# Patient Record
Sex: MALE | Race: WHITE | NOT HISPANIC OR LATINO | Employment: OTHER | URBAN - METROPOLITAN AREA
[De-identification: names, ages, dates, MRNs, and addresses within clinical notes are randomized per-mention and may not be internally consistent; named-entity substitution may affect disease eponyms.]

---

## 2017-01-24 ENCOUNTER — LAB VISIT (OUTPATIENT)
Dept: LAB | Facility: HOSPITAL | Age: 67
End: 2017-01-24
Attending: FAMILY MEDICINE
Payer: MEDICARE

## 2017-01-24 ENCOUNTER — OFFICE VISIT (OUTPATIENT)
Dept: INTERNAL MEDICINE | Facility: CLINIC | Age: 67
End: 2017-01-24
Payer: MEDICARE

## 2017-01-24 VITALS
WEIGHT: 301.38 LBS | HEIGHT: 71 IN | DIASTOLIC BLOOD PRESSURE: 76 MMHG | TEMPERATURE: 97 F | SYSTOLIC BLOOD PRESSURE: 128 MMHG | HEART RATE: 69 BPM | BODY MASS INDEX: 42.19 KG/M2

## 2017-01-24 DIAGNOSIS — I10 ESSENTIAL HYPERTENSION: Chronic | ICD-10-CM

## 2017-01-24 DIAGNOSIS — E78.00 ELEVATED CHOLESTEROL: Chronic | ICD-10-CM

## 2017-01-24 DIAGNOSIS — Z12.5 SCREENING FOR PROSTATE CANCER: ICD-10-CM

## 2017-01-24 DIAGNOSIS — Z00.00 ROUTINE HEALTH MAINTENANCE: Primary | ICD-10-CM

## 2017-01-24 DIAGNOSIS — K21.9 GASTROESOPHAGEAL REFLUX DISEASE, ESOPHAGITIS PRESENCE NOT SPECIFIED: ICD-10-CM

## 2017-01-24 DIAGNOSIS — E66.01 MORBID OBESITY, UNSPECIFIED OBESITY TYPE: ICD-10-CM

## 2017-01-24 DIAGNOSIS — R53.83 FATIGUE, UNSPECIFIED TYPE: ICD-10-CM

## 2017-01-24 LAB
BASOPHILS # BLD AUTO: 0.03 K/UL
BASOPHILS NFR BLD: 0.5 %
DIFFERENTIAL METHOD: ABNORMAL
EOSINOPHIL # BLD AUTO: 0.1 K/UL
EOSINOPHIL NFR BLD: 1.7 %
ERYTHROCYTE [DISTWIDTH] IN BLOOD BY AUTOMATED COUNT: 13.5 %
HCT VFR BLD AUTO: 44.6 %
HGB BLD-MCNC: 15 G/DL
LYMPHOCYTES # BLD AUTO: 1.8 K/UL
LYMPHOCYTES NFR BLD: 27.2 %
MCH RBC QN AUTO: 31.1 PG
MCHC RBC AUTO-ENTMCNC: 33.6 %
MCV RBC AUTO: 93 FL
MONOCYTES # BLD AUTO: 0.8 K/UL
MONOCYTES NFR BLD: 11.9 %
NEUTROPHILS # BLD AUTO: 3.9 K/UL
NEUTROPHILS NFR BLD: 58.5 %
PLATELET # BLD AUTO: 237 K/UL
PMV BLD AUTO: 11.2 FL
RBC # BLD AUTO: 4.82 M/UL
TESTOST SERPL-MCNC: 301 NG/DL
TSH SERPL DL<=0.005 MIU/L-ACNC: 2.55 UIU/ML
WBC # BLD AUTO: 6.57 K/UL

## 2017-01-24 PROCEDURE — 99397 PER PM REEVAL EST PAT 65+ YR: CPT | Mod: S$GLB,,, | Performed by: FAMILY MEDICINE

## 2017-01-24 PROCEDURE — 3078F DIAST BP <80 MM HG: CPT | Mod: S$GLB,,, | Performed by: FAMILY MEDICINE

## 2017-01-24 PROCEDURE — 3074F SYST BP LT 130 MM HG: CPT | Mod: S$GLB,,, | Performed by: FAMILY MEDICINE

## 2017-01-24 PROCEDURE — 84403 ASSAY OF TOTAL TESTOSTERONE: CPT

## 2017-01-24 PROCEDURE — 84443 ASSAY THYROID STIM HORMONE: CPT

## 2017-01-24 PROCEDURE — 85025 COMPLETE CBC W/AUTO DIFF WBC: CPT

## 2017-01-24 PROCEDURE — 36415 COLL VENOUS BLD VENIPUNCTURE: CPT | Mod: PO

## 2017-01-24 PROCEDURE — 99999 PR PBB SHADOW E&M-EST. PATIENT-LVL II: CPT | Mod: PBBFAC,,, | Performed by: FAMILY MEDICINE

## 2017-01-24 RX ORDER — ESOMEPRAZOLE MAGNESIUM 40 MG/1
40 CAPSULE, DELAYED RELEASE ORAL DAILY
Qty: 90 CAPSULE | Refills: 3 | Status: SHIPPED | OUTPATIENT
Start: 2017-01-24 | End: 2018-01-23 | Stop reason: SDUPTHER

## 2017-01-24 RX ORDER — ESOMEPRAZOLE MAGNESIUM 40 MG/1
CAPSULE, DELAYED RELEASE ORAL
COMMUNITY
Start: 2016-11-05 | End: 2017-01-24 | Stop reason: SDUPTHER

## 2017-01-24 NOTE — MR AVS SNAPSHOT
[facility] Internal Medicine  [address] [address] [address]  Phone: [phone]                  Juan Shepherd   2017 8:00 AM   Office Visit    Description:  Male : 1950   Provider:  Luis Farnsworth MD   Department:  [facility] Internal Medicine           Diagnoses this Visit        Comments    Routine health maintenance    -  Primary     Essential hypertension         Morbid obesity, unspecified obesity type         Elevated cholesterol         Gastroesophageal reflux disease, esophagitis presence not specified         Fatigue, unspecified type         Screening for prostate cancer                To Do List           Future Appointments        Provider Department Dept Phone    2017 9:10 AM [facility] LAB[facility]ATORY [facility] [phone]    2017 8:30 AM THOMAS Carr MD [facility] Ophthalmology [phone]    2018 8:00 AM [facility] LABORATORY [facility] [phone]    2018 8:20 AM Lusi Farnsworth MD [facility] Internal Medicine [phone]      Goals (5 Years of Data)     None      Follow-Up and Disposition     Return in about 1 year (around 2018).    Follow-up and Disposition History       These Medications        Disp Refills Start End    esomeprazole (NEXIUM) 40 MG capsule 90 capsule 3 2017     Take 1 capsule (40 mg total) by mouth once daily. - Oral    Pharmacy: [facility] - [place] [id number] [facility] #: [phone]         [facility]     [facility] -  Assistance  Registered nurses in the [facility] provide clinical advisement, health education, appointment booking, and other advisory services.  Call for this free service at [phone].             Medications           Message regarding Medications     Verify the changes and/or additions to your medication regime listed below are the same as discussed with your clinician today.  If any of  "these changes or additions are incorrect, please notify your healthcare provider.        START taking these NEW medications        Refills    esomeprazole (NEXIUM) 40 MG capsule 3    Sig: Take 1 capsule (40 mg total) by mouth once daily.    Class: Normal    Route: Oral      STOP taking these medications     simvastatin (ZOCOR) 40 MG tablet TAKE 1 TABLET EVERY EVENING           Verify that the below list of medications is an accurate representation of the medications you are currently taking.  If none reported, the list may be blank. If incorrect, please contact your healthcare provider. Carry this list with you in case of emergency.           Current Medications     aspirin (ECOTRIN) 81 MG EC tablet Take 81 mg by mouth once daily.    esomeprazole (NEXIUM) 40 MG capsule Take 1 capsule (40 mg total) by mouth once daily.    lisinopril-hydrochlorothiazide (PRINZIDE,ZESTORETIC) 20-12.5 mg per tablet TAKE 1 TABLET ONE TIME DAILY           Clinical Reference Information           Vital Signs - Last Recorded  Most recent update: 1/24/2017  8:07 AM by Enedina Avila LPN    BP Pulse Temp    128/76 (BP Location: Left arm, Patient Position: Sitting, BP Method: Manual) 69 96.8 °F (36 °C) (Tympanic)    Ht Wt BMI    5' 11" (1.803 m) (!) 136.7 kg (301 lb 5.9 oz) 42.03 kg/m2      Blood Pressure          Most Recent Value    BP  128/76      Allergies as of 1/24/2017     Bactrim [Sulfamethoxazole-trimethoprim]    Simvastatin      Immunizations Administered on Date of Encounter - 1/24/2017     None      Orders Placed During Today's Visit     Future Labs/Procedures Expected by Expires    CBC auto differential  1/24/2017 1/24/2018    Testosterone  1/24/2017 3/25/2018    TSH  1/24/2017 1/24/2018    Basic metabolic panel  1/24/2018 1/25/2018    Lipid panel  1/24/2018 1/24/2018    PSA, Screening  1/24/2018 1/25/2018      "

## 2017-01-24 NOTE — PROGRESS NOTES
Subjective:       Patient ID: Juan Clark is a 66 y.o. male.    Chief Complaint: here for physical examination and issues  below    HPI Hypertension: blood pressures at home normal. Tolerating medicine.   elev chol hx. Stopped statin 6 months ago helped w jt pain  Morbid obesity : we discussed need for  weight loss via health diet/portion control and if able then  Exercise;he is looking into gastric balloon with dr jignesh DAMICO asympt. Tolerating medication. No swallowing problems  intermitt fatigue chronic he asks about testost,thyroid;no snoring    Past Medical History   Diagnosis Date    Duodenitis      EGD 8/19/2016 (see outside procedure 10/27/2016 under [facility])    Elevated cholesterol     Ex-smoker     Gastritis      EGD 8/19/2016 (see outside procedure 10/27/2016 under [facility])    Hiatal hernia      EGD 8/19/2016 (see outside procedure 10/27/2016 under [facility])    Hypertension     Morbid obesity     Reflux esophagitis      EGD 8/19/2016 (see outside procedure 10/27/2016 under [facility])    Tinnitus      and hL eval by ent diana     Past Surgical History   Procedure Laterality Date    Cholecystectomy      Hand surgery       Family History   Problem Relation Age of Onset    Glaucoma Mother      Social History     Social History    Marital status:      Spouse name: N/A    Number of children: 2    Years of education: N/A     Social History Main Topics    Smoking status: Former Smoker    Smokeless tobacco: Never Used      Comment: light smoker quit over 36 y/    Alcohol use Yes      Comment: infrq use    Drug use: No    Sexual activity: No     Other Topics Concern    None     Social History Narrative        Brother dee dee clark    Retired [employer]         Review of Systems  Cardiovascular: no chest pain  Chest: no shortness of breath  Abd: no abd pain  Remainder review of systems negative    Objective:      Physical Exam   Constitutional: He is oriented to person, place, and  time. He appears well-developed and well-nourished.   HENT:   Head: Normocephalic and atraumatic.   Right Ear: External ear normal.   Left Ear: External ear normal.   Nose: Nose normal.   Mouth/Throat: Oropharynx is clear and moist.   Nl tms   Eyes: Conjunctivae and EOM are normal. Pupils are equal, round, and reactive to light. No scleral icterus.   Neck: Normal range of motion. Neck supple. Carotid bruit is not present.   Cardiovascular: Normal rate, regular rhythm and normal heart sounds.  Exam reveals no gallop and no friction rub.    No murmur heard.  Pulmonary/Chest: Effort normal and breath sounds normal. He has no wheezes.   Abdominal: Soft. Bowel sounds are normal. He exhibits no distension and no mass. There is no hepatosplenomegaly. There is no tenderness. There is no rebound and no guarding.   Musculoskeletal: Normal range of motion. He exhibits no tenderness.   Lymphadenopathy:     He has no cervical adenopathy.   Neurological: He is alert and oriented to person, place, and time. No cranial nerve deficit. Coordination normal.   Skin: Skin is warm and dry. No rash noted. No erythema.   Psychiatric: He has a normal mood and affect. His behavior is normal. Judgment and thought content normal.   Nursing note and vitals reviewed.      Assessment:       1. Routine health maintenance    2. Essential hypertension    3. Morbid obesity, unspecified obesity type    4. Elevated cholesterol    5. Gastroesophageal reflux disease, esophagitis presence not specified    6. Fatigue, unspecified type    7. Screening for prostate cancer        Plan:       **Routine health maintenance    Essential hypertension  -     Lipid panel; Future; Expected date: 1/24/18  -     Basic metabolic panel; Future; Expected date: 1/24/18    Morbid obesity, unspecified obesity type    Elevated cholesterol    Gastroesophageal reflux disease, esophagitis presence not specified    Fatigue, unspecified type  -     TSH; Future; Expected date:  1/24/17  -     Testosterone; Future; Expected date: 1/24/17  -     CBC auto differential; Future; Expected date: 1/24/17    Screening for prostate cancer  -     PSA, Screening; Future; Expected date: 1/24/18    Other orders  -     esomeprazole (NEXIUM) 40 MG capsule; Take 1 capsule (40 mg total) by mouth once daily.  Dispense: 90 capsule; Refill: 3    Lab 12 months and follow up after  *

## 2017-05-16 ENCOUNTER — OFFICE VISIT (OUTPATIENT)
Dept: OPHTHALMOLOGY | Facility: CLINIC | Age: 67
End: 2017-05-16
Payer: MEDICARE

## 2017-05-16 DIAGNOSIS — H25.13 NS (NUCLEAR SCLEROSIS), BILATERAL: ICD-10-CM

## 2017-05-16 DIAGNOSIS — I10 ESSENTIAL HYPERTENSION: Chronic | ICD-10-CM

## 2017-05-16 DIAGNOSIS — Z83.511 FAMILY HISTORY OF GLAUCOMA: Primary | Chronic | ICD-10-CM

## 2017-05-16 PROCEDURE — 99999 PR PBB SHADOW E&M-EST. PATIENT-LVL II: CPT | Mod: PBBFAC,,, | Performed by: OPHTHALMOLOGY

## 2017-05-16 PROCEDURE — 92014 COMPRE OPH EXAM EST PT 1/>: CPT | Mod: S$GLB,,, | Performed by: OPHTHALMOLOGY

## 2017-05-16 NOTE — PROGRESS NOTES
===============================  05/16/2017   Juan Shepherd,   66 y.o. male   Last visit [facility]: :5/10/2016   Last visit eye dept. Visit date not found  [facility]:  Corrected distance visual acuity was 20/20 in the right eye and 20/20 in the left eye.  Tonometry     Tonometry (Applanation, 12:58 PM)      Right Left   Pressure 18 16                  Not recorded         Not recorded        Chief Complaint   Patient presents with    cats     here for 1 yr ck up, pt states he is seeing great        HPI     cats    Additional comments: here for 1 yr ck up, pt states he is seeing great           Comments   No sx's  Family history of glaucoma  Minimal ns       Last edited by THOMAS Carr MD on 5/16/2017  1:24 PM. (History)      Read Studies:   Vitals  ________________  5/16/2017  Problem List Items Addressed This Visit        Eye/Vision problems    NS (nuclear sclerosis)       Other    Hypertension (Chronic)    Family history of glaucoma - Primary (Chronic)        No htnr  No glaucoma  minimal ns, follow.  .rtc 1 year       ===========================

## 2017-05-16 NOTE — MR AVS SNAPSHOT
[facility] - Ophthalmology  [address] [place] [address]  Phone: [phone]  Fax: [phone]                  Juan Shepherd   2017 12:30 PM   Office Visit    Description:  Male : 1950   Provider:  THOMAS Carr MD   Department:  [facility] - Ophthalmology           Reason for Visit     cats           Diagnoses this Visit        Comments    Family history of glaucoma    -  Primary     NS (nuclear sclerosis), bilateral         Essential hypertension                To Do List           Future Appointments        Provider Department Dept Phone    2018 8:00 AM [facility] LABORATORY [facility] [phone]    2018 8:20 AM Luis Farnsworth MD [facility] - Internal Medicine [phone]      Goals (5 Years of Data)     None      Follow-Up and Disposition     Return in about 1 year (around 2018).      [facility]     [facility] Care [facility] -  Assistance  Unless otherwise directed by your provider, please contact [facility], our nurse care line that is available for  assistance.     Registered nurses in the [facility] provide: appointment scheduling, clinical advisement, health education, and other advisory services.  Call: [phone] (toll free)               Medications           Message regarding Medications     Verify the changes and/or additions to your medication regime listed below are the same as discussed with your clinician today.  If any of these changes or additions are incorrect, please notify your healthcare provider.             Verify that the below list of medications is an accurate representation of the medications you are currently taking.  If none reported, the list may be blank. If incorrect, please contact your healthcare provider. Carry this list with you in case of emergency.           Current Medications     aspirin (ECOTRIN) 81 MG EC tablet Take 81 mg by mouth once daily.    esomeprazole (NEXIUM) 40 MG  capsule Take 1 capsule (40 mg total) by mouth once daily.    lisinopril-hydrochlorothiazide (PRINZIDE,ZESTORETIC) 20-12.5 mg per tablet TAKE 1 TABLET ONE TIME DAILY           Clinical Reference Information           Allergies as of 5/16/2017     Bactrim [Sulfamethoxazole-trimethoprim]    Simvastatin      Immunizations Administered on Date of Encounter - 5/16/2017     None      Language Assistance Services     ATTENTION: Language assistance services are available, free of charge. Please call [phone].      ATENCIÓN: Si maryse radha, tiene a hui disposición servicios gratuitos de asistencia lingüística. Llame al [phone].     [facility] Ý: N?u b?n nói Ti?ng Vi?t, có các d?ch v? h? tr? ngôn ng? mi?n phí dành cho b?n. G?i s? [phone].         [facility] - Ophthalmology complies with applicable Federal civil rights laws and does not discriminate on the basis of race, color, national origin, age, disability, or sex.

## 2017-08-10 RX ORDER — LISINOPRIL AND HYDROCHLOROTHIAZIDE 12.5; 2 MG/1; MG/1
TABLET ORAL
Qty: 90 TABLET | Refills: 1 | Status: SHIPPED | OUTPATIENT
Start: 2017-08-10 | End: 2018-01-30 | Stop reason: SDUPTHER

## 2017-11-27 ENCOUNTER — TELEPHONE (OUTPATIENT)
Dept: INTERNAL MEDICINE | Facility: CLINIC | Age: 67
End: 2017-11-27

## 2017-11-27 ENCOUNTER — OFFICE VISIT (OUTPATIENT)
Dept: INTERNAL MEDICINE | Facility: CLINIC | Age: 67
End: 2017-11-27
Payer: MEDICARE

## 2017-11-27 VITALS
WEIGHT: 284.81 LBS | HEIGHT: 70 IN | HEART RATE: 65 BPM | SYSTOLIC BLOOD PRESSURE: 140 MMHG | TEMPERATURE: 97 F | BODY MASS INDEX: 40.77 KG/M2 | RESPIRATION RATE: 16 BRPM | OXYGEN SATURATION: 99 % | DIASTOLIC BLOOD PRESSURE: 90 MMHG

## 2017-11-27 DIAGNOSIS — I10 ESSENTIAL HYPERTENSION: Primary | Chronic | ICD-10-CM

## 2017-11-27 DIAGNOSIS — E78.00 ELEVATED CHOLESTEROL: Chronic | ICD-10-CM

## 2017-11-27 DIAGNOSIS — M47.812 ARTHROPATHY OF CERVICAL FACET JOINT: ICD-10-CM

## 2017-11-27 DIAGNOSIS — E66.01 MORBID OBESITY: ICD-10-CM

## 2017-11-27 PROCEDURE — 99999 PR PBB SHADOW E&M-EST. PATIENT-LVL IV: CPT | Mod: PBBFAC,,, | Performed by: NURSE PRACTITIONER

## 2017-11-27 PROCEDURE — 99214 OFFICE O/P EST MOD 30 MIN: CPT | Mod: S$GLB,,, | Performed by: NURSE PRACTITIONER

## 2017-11-27 RX ORDER — MELOXICAM 15 MG/1
1 TABLET ORAL DAILY
Refills: 1 | COMMUNITY
Start: 2017-10-23 | End: 2017-12-11

## 2017-11-27 RX ORDER — AMLODIPINE BESYLATE 10 MG/1
10 TABLET ORAL DAILY
Qty: 30 TABLET | Refills: 0 | Status: SHIPPED | OUTPATIENT
Start: 2017-11-27 | End: 2018-01-30 | Stop reason: SDUPTHER

## 2017-11-27 RX ORDER — AMLODIPINE BESYLATE 10 MG/1
10 TABLET ORAL DAILY
Qty: 90 TABLET | Refills: 3 | Status: SHIPPED | OUTPATIENT
Start: 2017-11-27 | End: 2017-11-27 | Stop reason: SDUPTHER

## 2017-11-27 NOTE — PATIENT INSTRUCTIONS
Amlodipine tablets  What is this medicine?  AMLODIPINE (am SUDARSHAN mike radha) is a calcium-channel blocker. It affects the amount of calcium found in your heart and muscle cells. This relaxes your blood vessels, which can reduce the amount of work the heart has to do. This medicine is used to lower high blood pressure. It is also used to prevent chest pain.  How should I use this medicine?  Take this medicine by mouth with a glass of water. Follow the directions on the prescription label. Take your medicine at regular intervals. Do not take more medicine than directed.  Talk to your pediatrician regarding the use of this medicine in children. Special care may be needed. This medicine has been used in children as young as 6.  Persons over 65 years old may have a stronger reaction to this medicine and need smaller doses.  What side effects may I notice from receiving this medicine?  Side effects that you should report to your doctor or health care professional as soon as possible:  · allergic reactions like skin rash, itching or hives, swelling of the face, lips, or tongue  · breathing problems  · changes in vision or hearing  · chest pain  · fast, irregular heartbeat  · swelling of legs or ankles  Side effects that usually do not require medical attention (report to your doctor or health care professional if they continue or are bothersome):  · dry mouth  · facial flushing  · nausea, vomiting  · stomach gas, pain  · tired, weak  · trouble sleeping  What may interact with this medicine?  · herbal or dietary supplements  · local or general anesthetics  · medicines for high blood pressure  · medicines for prostate problems  · rifampin  What if I miss a dose?  If you miss a dose, take it as soon as you can. If it is almost time for your next dose, take only that dose. Do not take double or extra doses.  Where should I keep my medicine?  Keep out of the reach of children.  Store at room temperature between 59 and 86 degrees F  (15 and 30 degrees C). Protect from light. Keep container tightly closed. Throw away any unused medicine after the expiration date.  What should I tell my health care provider before I take this medicine?  They need to know if you have any of these conditions:  · heart problems like heart failure or aortic stenosis  · liver disease  · an unusual or allergic reaction to amlodipine, other medicines, foods, dyes, or preservatives  · pregnant or trying to get pregnant  · breast-feeding  What should I watch for while using this medicine?  Visit your doctor or health care professional for regular check ups. Check your blood pressure and pulse rate regularly. Ask your health care professional what your blood pressure and pulse rate should be, and when you should contact him or her.  This medicine may make you feel confused, dizzy or lightheaded. Do not drive, use machinery, or do anything that needs mental alertness until you know how this medicine affects you. To reduce the risk of dizzy or fainting spells, do not sit or stand up quickly, especially if you are an older patient. Avoid alcoholic drinks; they can make you more dizzy.  Do not suddenly stop taking amlodipine. Ask your doctor or health care professional how you can gradually reduce the dose.  NOTE:This sheet is a summary. It may not cover all possible information. If you have questions about this medicine, talk to your doctor, pharmacist, or health care provider. Copyright© 2017 [employer]

## 2017-11-27 NOTE — TELEPHONE ENCOUNTER
Patient calls states he is having elevated blood pressure,161/95 today, was 181/104, states he is now having headaches, numbness in the left side of his face, spoke with Kia Monet NP, advised patient go to ER, patient voices understanding and will go to  general

## 2017-11-27 NOTE — PROGRESS NOTES
Subjective:       Patient ID: Juan Shepherd is a 67 y.o. male.    Chief Complaint: Hypertension and Neck Pain    Hypertension   This is a chronic problem. The current episode started more than 1 year ago. The problem has been gradually worsening since onset. The problem is uncontrolled. Associated symptoms include headaches and neck pain. Pertinent negatives include no anxiety, blurred vision, chest pain, malaise/fatigue, orthopnea, palpitations, peripheral edema, PND, shortness of breath or sweats. Agents associated with hypertension include NSAIDs. Risk factors for coronary artery disease include dyslipidemia, obesity, male gender and sedentary lifestyle. Past treatments include ACE inhibitors and diuretics. The current treatment provides significant improvement. Compliance problems include exercise and diet.    Neck Pain    This is a recurrent problem. The current episode started more than 1 month ago. The problem occurs constantly. The problem has been gradually improving. The pain is associated with nothing. The pain is present in the midline. The quality of the pain is described as aching. The pain is mild. The symptoms are aggravated by position. Stiffness is present in the morning. Associated symptoms include headaches and tingling. Pertinent negatives include no chest pain, fever, leg pain, numbness, pain with swallowing, paresis, photophobia, syncope, trouble swallowing, visual change, weakness or weight loss. He has tried NSAIDs and home exercises for the symptoms.     Review of Systems   Constitutional: Negative for appetite change, chills, diaphoresis, fatigue, fever, malaise/fatigue and weight loss.   HENT: Negative for postnasal drip, rhinorrhea, sinus pain, sinus pressure and trouble swallowing.    Eyes: Negative for blurred vision, photophobia, pain and visual disturbance.   Respiratory: Negative for chest tightness, shortness of breath and wheezing.    Cardiovascular: Negative for chest pain,  palpitations, orthopnea, syncope and PND.   Gastrointestinal: Negative.    Endocrine: Negative.    Musculoskeletal: Positive for neck pain. Negative for arthralgias and myalgias.   Skin: Negative.    Allergic/Immunologic: Negative.    Neurological: Positive for tingling and headaches. Negative for dizziness, weakness and numbness.   Psychiatric/Behavioral: Negative.        Objective:      Physical Exam   Constitutional: He is oriented to person, place, and time. He appears well-developed. No distress.   obese   Eyes: Conjunctivae are normal. Pupils are equal, round, and reactive to light. Right eye exhibits no discharge. Left eye exhibits no discharge.   Neck: Normal range of motion. Neck supple. No JVD present.   Cardiovascular: Normal rate, regular rhythm, normal heart sounds and intact distal pulses.    Pulmonary/Chest: Effort normal and breath sounds normal. No respiratory distress. He has no wheezes.   Abdominal: Soft. Bowel sounds are normal. He exhibits no distension. There is no tenderness.   Musculoskeletal: Normal range of motion. He exhibits edema (trace BLE).        Cervical back: He exhibits tenderness and pain. He exhibits normal range of motion, no bony tenderness, no swelling, no edema, no deformity and no spasm.   Neurological: He is alert and oriented to person, place, and time.   Skin: Skin is warm and dry. No rash noted. He is not diaphoretic.   Psychiatric: He has a normal mood and affect. His behavior is normal.   Nursing note and vitals reviewed.      Assessment:       1. Essential hypertension    2. Morbid obesity    3. Elevated cholesterol        Plan:       *Essential hypertension  Adding amlodipine sent 30 day supply send to local pharmacy  RTC 2 weeks for recheck  -    amLODIPine (NORVASC) 10 MG tablet; Take 1 tablet (10 mg total) by mouth once daily.  Dispense: 90 tablet; Refill: 3  -     amLODIPine (NORVASC) 10 MG tablet; Take 1 tablet (10 mg total) by mouth once daily.  Dispense: 30  tablet; Refill: 0    Morbid obesity  Educated on importance of healthy/balanced diet and getting 150 minutes of exercise per week to promote weight loss, reach optimal BMI, improve comorbidities and improve lifestyle.     Arthropathy of cervical facet joint  Tylenol as needed for pain, try to avoid NSAIDs since BP uncontrolled  Return to Dr Rosenthal if needed for pain mgmt  **

## 2017-11-28 ENCOUNTER — PATIENT OUTREACH (OUTPATIENT)
Dept: ADMINISTRATIVE | Facility: HOSPITAL | Age: 67
End: 2017-11-28

## 2017-12-11 ENCOUNTER — OFFICE VISIT (OUTPATIENT)
Dept: INTERNAL MEDICINE | Facility: CLINIC | Age: 67
End: 2017-12-11
Payer: MEDICARE

## 2017-12-11 VITALS
TEMPERATURE: 97 F | DIASTOLIC BLOOD PRESSURE: 74 MMHG | RESPIRATION RATE: 18 BRPM | WEIGHT: 282.63 LBS | BODY MASS INDEX: 40.46 KG/M2 | SYSTOLIC BLOOD PRESSURE: 126 MMHG | HEART RATE: 77 BPM | HEIGHT: 70 IN | OXYGEN SATURATION: 99 %

## 2017-12-11 DIAGNOSIS — M47.812 ARTHROPATHY OF CERVICAL FACET JOINT: ICD-10-CM

## 2017-12-11 DIAGNOSIS — I10 ESSENTIAL HYPERTENSION: Primary | ICD-10-CM

## 2017-12-11 DIAGNOSIS — J01.10 ACUTE NON-RECURRENT FRONTAL SINUSITIS: ICD-10-CM

## 2017-12-11 PROCEDURE — 99999 PR PBB SHADOW E&M-EST. PATIENT-LVL IV: CPT | Mod: PBBFAC,,, | Performed by: NURSE PRACTITIONER

## 2017-12-11 PROCEDURE — 99214 OFFICE O/P EST MOD 30 MIN: CPT | Mod: S$GLB,,, | Performed by: NURSE PRACTITIONER

## 2017-12-11 RX ORDER — FLUTICASONE PROPIONATE 50 MCG
2 SPRAY, SUSPENSION (ML) NASAL DAILY
Qty: 16 G | Refills: 0 | Status: SHIPPED | OUTPATIENT
Start: 2017-12-11 | End: 2018-01-30

## 2017-12-11 RX ORDER — TETANUS TOXOID, REDUCED DIPHTHERIA TOXOID AND ACELLULAR PERTUSSIS VACCINE, ADSORBED 5; 2.5; 8; 8; 2.5 [IU]/.5ML; [IU]/.5ML; UG/.5ML; UG/.5ML; UG/.5ML
SUSPENSION INTRAMUSCULAR
COMMUNITY
Start: 2017-11-29 | End: 2018-05-22

## 2017-12-11 NOTE — PROGRESS NOTES
Subjective:       Patient ID: Juan Shepherd is a 67 y.o. male.    Chief Complaint: Hypertension and Sinus Problem    Hypertension   Associated symptoms include headaches. Pertinent negatives include no neck pain or shortness of breath.   Sinus Problem   This is a new problem. The current episode started in the past 7 days. The problem is unchanged. There has been no fever. Associated symptoms include congestion, coughing, headaches and sinus pressure. Pertinent negatives include no chills, diaphoresis, ear pain, hoarse voice, neck pain, shortness of breath, sneezing, sore throat or swollen glands. Treatments tried: croicidin. The treatment provided mild relief.     Review of Systems   Constitutional: Negative for appetite change, chills, diaphoresis, fatigue and fever.   HENT: Positive for congestion, postnasal drip, rhinorrhea and sinus pressure. Negative for ear pain, hoarse voice, sinus pain, sneezing and sore throat.    Eyes: Negative.    Respiratory: Positive for cough. Negative for chest tightness and shortness of breath.    Gastrointestinal: Negative.    Musculoskeletal: Negative for arthralgias, myalgias and neck pain.   Skin: Negative.    Allergic/Immunologic: Positive for environmental allergies.   Neurological: Positive for headaches. Negative for dizziness and weakness.   Psychiatric/Behavioral: Negative.        Objective:      Physical Exam   Constitutional: He is oriented to person, place, and time. He appears well-developed. No distress.   HENT:   Head: Normocephalic and atraumatic.   Right Ear: Hearing, tympanic membrane, external ear and ear canal normal.   Left Ear: Hearing, tympanic membrane, external ear and ear canal normal.   Nose: Mucosal edema, rhinorrhea and sinus tenderness present. Right sinus exhibits no maxillary sinus tenderness and no frontal sinus tenderness. Left sinus exhibits no maxillary sinus tenderness.   Mouth/Throat: Posterior oropharyngeal erythema present. No oropharyngeal  exudate or posterior oropharyngeal edema. No tonsillar exudate.   Eyes: Conjunctivae are normal. Pupils are equal, round, and reactive to light. Right eye exhibits no discharge. Left eye exhibits no discharge.   Neck: Normal range of motion.   Cardiovascular: Normal rate and regular rhythm.    Pulmonary/Chest: Effort normal and breath sounds normal. No respiratory distress. He has no wheezes.   Abdominal: Soft. Bowel sounds are normal. He exhibits no distension. There is no tenderness.   Lymphadenopathy:     He has no cervical adenopathy.   Neurological: He is alert and oriented to person, place, and time.   Skin: Skin is warm and dry. No rash noted. He is not diaphoretic.   Psychiatric: He has a normal mood and affect. His behavior is normal.       Assessment:       1. Essential hypertension    2. Acute non-recurrent frontal sinusitis    3. Arthropathy of cervical facet joint        Plan:       *Essential hypertension  Controlled with increase amlodipine, continue current regimen   RTC as scheduled end of Jan    Acute non-recurrent frontal sinusitis  Discussed supportive home care including rest, hydration, hot fluids with honey, saline spray and nasal washes, avoidance of smoke and tylenol/motrin for sore throat and body aches. Handout given. Pt verbalizes understanding.  RTC if not improving in next few days and will consider ABX, most likely viral  -     fluticasone (FLONASE) 50 mcg/actuation nasal spray; 2 sprays by Each Nare route once daily.  Dispense: 16 g; Refill: 0    Arthropathy of cervical facet joint  Steroid shot scheduled next week with Dr Rosenthal    **

## 2018-01-17 ENCOUNTER — PATIENT OUTREACH (OUTPATIENT)
Dept: ADMINISTRATIVE | Facility: HOSPITAL | Age: 68
End: 2018-01-17

## 2018-01-23 RX ORDER — ESOMEPRAZOLE MAGNESIUM 40 MG/1
CAPSULE, DELAYED RELEASE ORAL
Qty: 90 CAPSULE | Refills: 3 | Status: SHIPPED | OUTPATIENT
Start: 2018-01-23 | End: 2018-01-30 | Stop reason: SDUPTHER

## 2018-01-24 ENCOUNTER — LAB VISIT (OUTPATIENT)
Dept: LAB | Facility: HOSPITAL | Age: 68
End: 2018-01-24
Attending: FAMILY MEDICINE
Payer: MEDICARE

## 2018-01-24 DIAGNOSIS — I10 ESSENTIAL HYPERTENSION: Chronic | ICD-10-CM

## 2018-01-24 DIAGNOSIS — Z12.5 SCREENING FOR PROSTATE CANCER: ICD-10-CM

## 2018-01-24 LAB
ANION GAP SERPL CALC-SCNC: 8 MMOL/L
BUN SERPL-MCNC: 18 MG/DL
CALCIUM SERPL-MCNC: 10 MG/DL
CHLORIDE SERPL-SCNC: 103 MMOL/L
CHOLEST SERPL-MCNC: 158 MG/DL
CHOLEST/HDLC SERPL: 2.7 {RATIO}
CO2 SERPL-SCNC: 28 MMOL/L
COMPLEXED PSA SERPL-MCNC: 1.9 NG/ML
CREAT SERPL-MCNC: 0.9 MG/DL
EST. GFR  (AFRICAN AMERICAN): >60 ML/MIN/1.73 M^2
EST. GFR  (NON AFRICAN AMERICAN): >60 ML/MIN/1.73 M^2
GLUCOSE SERPL-MCNC: 114 MG/DL
HDLC SERPL-MCNC: 58 MG/DL
HDLC SERPL: 36.7 %
LDLC SERPL CALC-MCNC: 92.2 MG/DL
NONHDLC SERPL-MCNC: 100 MG/DL
POTASSIUM SERPL-SCNC: 4.9 MMOL/L
SODIUM SERPL-SCNC: 139 MMOL/L
TRIGL SERPL-MCNC: 39 MG/DL

## 2018-01-24 PROCEDURE — 36415 COLL VENOUS BLD VENIPUNCTURE: CPT | Mod: PO

## 2018-01-24 PROCEDURE — 80048 BASIC METABOLIC PNL TOTAL CA: CPT | Mod: PO

## 2018-01-24 PROCEDURE — 80061 LIPID PANEL: CPT

## 2018-01-24 PROCEDURE — 84153 ASSAY OF PSA TOTAL: CPT

## 2018-01-30 ENCOUNTER — OFFICE VISIT (OUTPATIENT)
Dept: INTERNAL MEDICINE | Facility: CLINIC | Age: 68
End: 2018-01-30
Payer: MEDICARE

## 2018-01-30 VITALS
TEMPERATURE: 97 F | OXYGEN SATURATION: 98 % | RESPIRATION RATE: 16 BRPM | WEIGHT: 278.69 LBS | SYSTOLIC BLOOD PRESSURE: 100 MMHG | HEIGHT: 70 IN | BODY MASS INDEX: 39.9 KG/M2 | HEART RATE: 76 BPM | DIASTOLIC BLOOD PRESSURE: 76 MMHG

## 2018-01-30 DIAGNOSIS — E66.01 MORBID OBESITY: ICD-10-CM

## 2018-01-30 DIAGNOSIS — I10 ESSENTIAL HYPERTENSION: Chronic | ICD-10-CM

## 2018-01-30 DIAGNOSIS — R05.9 COUGH: Primary | ICD-10-CM

## 2018-01-30 PROCEDURE — 99999 PR PBB SHADOW E&M-EST. PATIENT-LVL III: CPT | Mod: PBBFAC,,, | Performed by: FAMILY MEDICINE

## 2018-01-30 PROCEDURE — 99214 OFFICE O/P EST MOD 30 MIN: CPT | Mod: S$GLB,,, | Performed by: FAMILY MEDICINE

## 2018-01-30 RX ORDER — AMLODIPINE BESYLATE 10 MG/1
10 TABLET ORAL DAILY
Qty: 90 TABLET | Refills: 3 | Status: SHIPPED | OUTPATIENT
Start: 2018-01-30 | End: 2018-11-08 | Stop reason: SDUPTHER

## 2018-01-30 RX ORDER — SIMVASTATIN 40 MG/1
40 TABLET, FILM COATED ORAL NIGHTLY
Qty: 90 TABLET | Refills: 3 | Status: SHIPPED | OUTPATIENT
Start: 2018-01-30 | End: 2018-11-08 | Stop reason: SDUPTHER

## 2018-01-30 RX ORDER — ESOMEPRAZOLE MAGNESIUM 40 MG/1
40 CAPSULE, DELAYED RELEASE ORAL DAILY
Qty: 90 CAPSULE | Refills: 3 | Status: SHIPPED | OUTPATIENT
Start: 2018-01-30 | End: 2019-02-04 | Stop reason: SDUPTHER

## 2018-01-30 RX ORDER — LISINOPRIL AND HYDROCHLOROTHIAZIDE 12.5; 2 MG/1; MG/1
1 TABLET ORAL DAILY
Qty: 90 TABLET | Refills: 3 | Status: SHIPPED | OUTPATIENT
Start: 2018-01-30 | End: 2018-11-08 | Stop reason: SDUPTHER

## 2018-01-30 RX ORDER — BENZONATATE 100 MG/1
100 CAPSULE ORAL 3 TIMES DAILY PRN
Qty: 30 CAPSULE | Refills: 0 | Status: SHIPPED | OUTPATIENT
Start: 2018-01-30 | End: 2018-02-09

## 2018-01-30 RX ORDER — SIMVASTATIN 40 MG/1
40 TABLET, FILM COATED ORAL NIGHTLY
COMMUNITY
End: 2018-01-30 | Stop reason: SDUPTHER

## 2018-01-30 NOTE — PROGRESS NOTES
Subjective:       Patient ID: Juan Shepherd is a 67 y.o. male.    Chief Complaint: Annual Exam; Cough; and Nasal Congestion    Cough   This is a new problem. The current episode started in the past 7 days. The problem has been gradually improving. The problem occurs constantly. The cough is non-productive. Associated symptoms include headaches and nasal congestion. Pertinent negatives include no chest pain, ear congestion, ear pain, fever, heartburn, sore throat or shortness of breath. Nothing aggravates the symptoms. Treatments tried: coricidin.     Review of Systems   Constitutional: Negative for fever.   HENT: Negative for ear pain and sore throat.    Respiratory: Positive for cough. Negative for shortness of breath.    Cardiovascular: Negative for chest pain.   Gastrointestinal: Negative for heartburn.   Neurological: Positive for headaches.       Objective:      Physical Exam   Constitutional: He appears well-developed and well-nourished. No distress.   HENT:   Head: Normocephalic and atraumatic.   Right Ear: Hearing, tympanic membrane and ear canal normal.   Left Ear: Hearing, tympanic membrane and ear canal normal.   Nose: Nose normal.   Mouth/Throat: Oropharynx is clear and moist.   Pulmonary/Chest: Effort normal and breath sounds normal. No respiratory distress. He has no wheezes.   Skin: Skin is warm and dry. No rash noted. He is not diaphoretic. No erythema.   Nursing note and vitals reviewed.      Assessment:       1. Cough    2. Morbid obesity    3. Essential hypertension        Plan:     Problem List Items Addressed This Visit        Pulmonary    Cough - Primary    Relevant Medications    benzonatate (TESSALON) 100 MG capsule       Cardiac/Vascular    Hypertension (Chronic)    Relevant Medications    amLODIPine (NORVASC) 10 MG tablet    lisinopril-hydrochlorothiazide (PRINZIDE,ZESTORETIC) 20-12.5 mg per tablet       Endocrine    Morbid obesity

## 2018-01-30 NOTE — ASSESSMENT & PLAN NOTE
Do not eat starches. (nothing white)  Stop sugary snacks,  Stop bottled juices, or sodas.  See if you can lose any weight by stopping these items first, then we will re-visit the issue.

## 2018-05-22 ENCOUNTER — OFFICE VISIT (OUTPATIENT)
Dept: OPHTHALMOLOGY | Facility: CLINIC | Age: 68
End: 2018-05-22
Payer: MEDICARE

## 2018-05-22 DIAGNOSIS — H25.13 AGE-RELATED NUCLEAR CATARACT OF BOTH EYES: ICD-10-CM

## 2018-05-22 DIAGNOSIS — I10 ESSENTIAL HYPERTENSION: Chronic | ICD-10-CM

## 2018-05-22 DIAGNOSIS — Z83.511 FAMILY HISTORY OF GLAUCOMA: Primary | Chronic | ICD-10-CM

## 2018-05-22 PROCEDURE — 92014 COMPRE OPH EXAM EST PT 1/>: CPT | Mod: S$GLB,,, | Performed by: OPHTHALMOLOGY

## 2018-05-22 PROCEDURE — 99999 PR PBB SHADOW E&M-EST. PATIENT-LVL II: CPT | Mod: PBBFAC,,, | Performed by: OPHTHALMOLOGY

## 2018-05-22 NOTE — PROGRESS NOTES
===============================  05/22/2018   Juan Shepherd,   67 y.o. male   Last visit [facility]: :5/16/2017   Last visit eye dept. Visit date not found  [facility]:  Corrected distance visual acuity was 20/20 in the right eye and 20/20 in the left eye.  Tonometry     Tonometry (Applanation, 1:14 PM)       Right Left    Pressure 15 17               Not recorded         Not recorded        Chief Complaint   Patient presents with    cataracts     pt states here for 1 yr eye exam, no va c/o        HPI     cataracts    Additional comments: pt states here for 1 yr eye exam, no va c/o           Comments   No sx's  Family history of glaucoma  Minimal ns       Last edited by THOMAS Carr MD on 5/22/2018  1:40 PM. (History)          ________________  5/22/2018  Problem List Items Addressed This Visit        Eye/Vision problems    Age-related nuclear cataract of both eyes       Other    Hypertension (Chronic)    Family history of glaucoma - Primary (Chronic)          .minimal cataracts  Family history of glaucoma, no glaucoma in patient  No htnr  Continue to monitor  rtc 1 year       ===========================

## 2018-07-30 ENCOUNTER — HOSPITAL ENCOUNTER (OUTPATIENT)
Dept: CARDIOLOGY | Facility: CLINIC | Age: 68
Discharge: HOME | End: 2018-07-30
Payer: MEDICARE

## 2018-07-30 ENCOUNTER — TELEPHONE (OUTPATIENT)
Dept: INTERNAL MEDICINE | Facility: CLINIC | Age: 68
End: 2018-07-30

## 2018-07-30 ENCOUNTER — LAB VISIT (OUTPATIENT)
Dept: LAB | Facility: HOSPITAL | Age: 68
End: 2018-07-30
Attending: FAMILY MEDICINE
Payer: MEDICARE

## 2018-07-30 ENCOUNTER — OFFICE VISIT (OUTPATIENT)
Dept: INTERNAL MEDICINE | Facility: CLINIC | Age: 68
End: 2018-07-30
Payer: MEDICARE

## 2018-07-30 VITALS
BODY MASS INDEX: 40.59 KG/M2 | DIASTOLIC BLOOD PRESSURE: 82 MMHG | HEART RATE: 66 BPM | SYSTOLIC BLOOD PRESSURE: 120 MMHG | RESPIRATION RATE: 16 BRPM | TEMPERATURE: 98 F | WEIGHT: 283.5 LBS | HEIGHT: 70 IN | OXYGEN SATURATION: 98 %

## 2018-07-30 DIAGNOSIS — Z01.818 PRE-OPERATIVE CLEARANCE: ICD-10-CM

## 2018-07-30 DIAGNOSIS — M79.644 THUMB PAIN, RIGHT: Primary | ICD-10-CM

## 2018-07-30 DIAGNOSIS — Z01.818 PRE-OP EXAMINATION: ICD-10-CM

## 2018-07-30 LAB
ALBUMIN SERPL BCP-MCNC: 3.8 G/DL
ALP SERPL-CCNC: 81 U/L
ALT SERPL W/O P-5'-P-CCNC: 17 U/L
ANION GAP SERPL CALC-SCNC: 9 MMOL/L
AST SERPL-CCNC: 13 U/L
BASOPHILS # BLD AUTO: 0.03 K/UL
BASOPHILS NFR BLD: 0.5 %
BILIRUB SERPL-MCNC: 0.6 MG/DL
BUN SERPL-MCNC: 15 MG/DL
CALCIUM SERPL-MCNC: 9.5 MG/DL
CHLORIDE SERPL-SCNC: 103 MMOL/L
CO2 SERPL-SCNC: 29 MMOL/L
CREAT SERPL-MCNC: 1 MG/DL
DIFFERENTIAL METHOD: ABNORMAL
EOSINOPHIL # BLD AUTO: 0.1 K/UL
EOSINOPHIL NFR BLD: 1.9 %
ERYTHROCYTE [DISTWIDTH] IN BLOOD BY AUTOMATED COUNT: 13.8 %
EST. GFR  (AFRICAN AMERICAN): >60 ML/MIN/1.73 M^2
EST. GFR  (NON AFRICAN AMERICAN): >60 ML/MIN/1.73 M^2
GLUCOSE SERPL-MCNC: 99 MG/DL
HCT VFR BLD AUTO: 42.9 %
HGB BLD-MCNC: 14.3 G/DL
LYMPHOCYTES # BLD AUTO: 1.5 K/UL
LYMPHOCYTES NFR BLD: 26.2 %
MCH RBC QN AUTO: 31.2 PG
MCHC RBC AUTO-ENTMCNC: 33.3 G/DL
MCV RBC AUTO: 94 FL
MONOCYTES # BLD AUTO: 0.5 K/UL
MONOCYTES NFR BLD: 9.5 %
NEUTROPHILS # BLD AUTO: 3.5 K/UL
NEUTROPHILS NFR BLD: 61.5 %
PLATELET # BLD AUTO: 197 K/UL
PMV BLD AUTO: 10 FL
POTASSIUM SERPL-SCNC: 4.6 MMOL/L
PROT SERPL-MCNC: 7.2 G/DL
RBC # BLD AUTO: 4.58 M/UL
SODIUM SERPL-SCNC: 141 MMOL/L
WBC # BLD AUTO: 5.68 K/UL

## 2018-07-30 PROCEDURE — 3074F SYST BP LT 130 MM HG: CPT | Mod: CPTII,S$GLB,, | Performed by: FAMILY MEDICINE

## 2018-07-30 PROCEDURE — 93005 ELECTROCARDIOGRAM TRACING: CPT | Mod: S$GLB,,, | Performed by: FAMILY MEDICINE

## 2018-07-30 PROCEDURE — 99213 OFFICE O/P EST LOW 20 MIN: CPT | Mod: S$GLB,,, | Performed by: FAMILY MEDICINE

## 2018-07-30 PROCEDURE — 99999 PR PBB SHADOW E&M-EST. PATIENT-LVL III: CPT | Mod: PBBFAC,,, | Performed by: FAMILY MEDICINE

## 2018-07-30 PROCEDURE — 3079F DIAST BP 80-89 MM HG: CPT | Mod: CPTII,S$GLB,, | Performed by: FAMILY MEDICINE

## 2018-07-30 PROCEDURE — 80053 COMPREHEN METABOLIC PANEL: CPT | Mod: PO

## 2018-07-30 PROCEDURE — 93010 ELECTROCARDIOGRAM REPORT: CPT | Mod: S$GLB,,, | Performed by: NUCLEAR MEDICINE

## 2018-07-30 PROCEDURE — 85025 COMPLETE CBC W/AUTO DIFF WBC: CPT | Mod: PO

## 2018-07-30 PROCEDURE — 36415 COLL VENOUS BLD VENIPUNCTURE: CPT | Mod: PO

## 2018-07-30 NOTE — ASSESSMENT & PLAN NOTE
May stop aspirin up to 5 days pre-op.    RCRI risk of zero.    EKG: sinus robert, normal rhythm, no st elevations. No previous ekg.

## 2018-07-30 NOTE — TELEPHONE ENCOUNTER
----- Message from Robert Ruiz sent at 7/30/2018  1:31 PM CDT -----  Contact: Dr. Leroy from [facility]   Nurse is calling regarding requesting CBC,  Medical Allergies  List, and Medical History. Pt Pre Surgery Clearance . ..Phone#[phone] Fax#[phone]

## 2018-07-30 NOTE — PROGRESS NOTES
Subjective:       Patient ID: Juan Shepherd is a 67 y.o. male.    Chief Complaint: Pre-op Exam    Right thumb pain    O: 1 yr  L: right thumb  D: worsening  C:  Trevor:  Exac: mov't    Will see Dr. Leroy for surgert, here today for pre-op        Review of Systems   Respiratory: Negative for shortness of breath.    Cardiovascular: Negative for chest pain.   Gastrointestinal: Negative for abdominal pain.       Objective:      Physical Exam   Constitutional: He is oriented to person, place, and time. He appears well-developed and well-nourished. No distress.   HENT:   Head: Normocephalic and atraumatic.   Cardiovascular: Normal rate, regular rhythm and intact distal pulses.    No murmur heard.  Pulmonary/Chest: Effort normal and breath sounds normal. No respiratory distress. He has no wheezes.   Abdominal: Soft. Bowel sounds are normal. He exhibits no distension. There is no tenderness.   Neurological: He is alert and oriented to person, place, and time.   Skin: Skin is warm and dry. No rash noted. He is not diaphoretic. No erythema.   Nursing note and vitals reviewed.      Assessment:       1. Thumb pain, right    2. Pre-operative clearance    3. Pre-op examination        Plan:     Problem List Items Addressed This Visit        Orthopedic    Thumb pain, right - Primary       Other    Pre-operative clearance    Current Assessment & Plan     May stop aspirin up to 5 days pre-op.    RCRI risk of zero.         Relevant Orders    CBC auto differential    Comprehensive metabolic panel    IN OFFICE EKG 12-LEAD (to [place])      Other Visit Diagnoses     Pre-op examination        Relevant Orders    IN OFFICE EKG 12-LEAD (to [place])

## 2018-11-08 DIAGNOSIS — I10 ESSENTIAL HYPERTENSION: Chronic | ICD-10-CM

## 2018-11-12 RX ORDER — LISINOPRIL AND HYDROCHLOROTHIAZIDE 12.5; 2 MG/1; MG/1
TABLET ORAL
Qty: 90 TABLET | Refills: 0 | Status: SHIPPED | OUTPATIENT
Start: 2018-11-12 | End: 2019-01-29 | Stop reason: SDUPTHER

## 2018-11-12 RX ORDER — SIMVASTATIN 40 MG/1
TABLET, FILM COATED ORAL
Qty: 90 TABLET | Refills: 0 | Status: SHIPPED | OUTPATIENT
Start: 2018-11-12 | End: 2019-01-29 | Stop reason: SDUPTHER

## 2018-11-12 RX ORDER — AMLODIPINE BESYLATE 10 MG/1
TABLET ORAL
Qty: 90 TABLET | Refills: 0 | Status: SHIPPED | OUTPATIENT
Start: 2018-11-12 | End: 2019-01-29 | Stop reason: SDUPTHER

## 2018-11-13 ENCOUNTER — TELEPHONE (OUTPATIENT)
Dept: INTERNAL MEDICINE | Facility: CLINIC | Age: 68
End: 2018-11-13

## 2018-11-13 NOTE — TELEPHONE ENCOUNTER
Pt call returned, pt informed all requested Rx was sent to [facility] on 11/12/18. Pt voiced understanding

## 2018-11-13 NOTE — TELEPHONE ENCOUNTER
----- Message from Latanya Major sent at 11/13/2018 12:18 PM CST -----  Contact: Patient   Patient would like a call back at [phone], Regards to a fax that was sent over for his refills from pharmacy.    Thanks  Td

## 2019-01-02 ENCOUNTER — TELEPHONE (OUTPATIENT)
Dept: INTERNAL MEDICINE | Facility: CLINIC | Age: 69
End: 2019-01-02

## 2019-01-02 NOTE — TELEPHONE ENCOUNTER
----- Message from Una Vasquez sent at 1/2/2019 10:39 AM CST -----  Contact: Pt   Pt is requesting orders for Annual Blood work.     Pt can be reached at [phone].

## 2019-01-17 ENCOUNTER — PATIENT OUTREACH (OUTPATIENT)
Dept: ADMINISTRATIVE | Facility: HOSPITAL | Age: 69
End: 2019-01-17

## 2019-01-29 PROBLEM — Z12.5 SCREENING PSA (PROSTATE SPECIFIC ANTIGEN): Status: ACTIVE | Noted: 2019-01-29

## 2019-01-29 PROBLEM — Z01.818 PRE-OPERATIVE CLEARANCE: Status: RESOLVED | Noted: 2018-07-30 | Resolved: 2019-01-29

## 2019-01-29 PROBLEM — Z28.9 DELAYED IMMUNIZATIONS: Status: ACTIVE | Noted: 2019-01-29

## 2019-01-29 PROBLEM — R05.9 COUGH: Status: RESOLVED | Noted: 2018-01-30 | Resolved: 2019-01-29

## 2019-01-29 NOTE — PROGRESS NOTES
Subjective:       Patient ID: Juan Shepherd is a 68 y.o. male.    Chief Complaint: Annual Exam    Left Knee Pain:    O: years  L: left knee  D: constant. worsening  C:  Trevor: NSAIDS  Exac:        Hypertension   This is a chronic problem. The current episode started more than 1 year ago. The problem has been gradually worsening since onset. The problem is controlled. Pertinent negatives include no anxiety, blurred vision, chest pain, headaches or shortness of breath. There are no associated agents to hypertension. Risk factors for coronary artery disease include male gender, obesity and dyslipidemia. Past treatments include calcium channel blockers, alpha 1 blockers and diuretics. There are no compliance problems.      Review of Systems   Eyes: Negative for blurred vision.   Respiratory: Negative for shortness of breath.    Cardiovascular: Negative for chest pain.   Gastrointestinal: Negative for abdominal pain.   Neurological: Negative for headaches.       Objective:      Physical Exam   Constitutional: He appears well-developed and well-nourished. No distress.   HENT:   Head: Normocephalic and atraumatic.   Pulmonary/Chest: Effort normal and breath sounds normal. No respiratory distress. He has no wheezes.   Musculoskeletal: Normal range of motion. He exhibits no edema, tenderness or deformity.   No laxity of joint to left knee.     Skin: Skin is warm and dry. No rash noted. He is not diaphoretic. No erythema.   Nursing note and vitals reviewed.      Assessment:       1. Essential hypertension    2. Elevated cholesterol    3. Screening PSA (prostate specific antigen)    4. Delayed immunizations    5. Arthritis of knee        Plan:     Problem List Items Addressed This Visit        Cardiac/Vascular    Elevated cholesterol (Chronic)    Relevant Medications    simvastatin (ZOCOR) 40 MG tablet    Other Relevant Orders    Lipid panel    Hypertension - Primary (Chronic)    Relevant Medications     lisinopril-hydrochlorothiazide (PRINZIDE,ZESTORETIC) 20-12.5 mg per tablet    amLODIPine (NORVASC) 10 MG tablet    Other Relevant Orders    Lipid panel    Comprehensive metabolic panel       Renal/    Screening PSA (prostate specific antigen)    Relevant Orders    PSA, Screening       ID    Delayed immunizations    Relevant Medications    diphth,pertus,acell,,tetanus (BOOSTRIX) 2.5-8-5 Lf-mcg-Lf/0.5mL Susp       Orthopedic    Arthritis of knee    Relevant Orders    X-ray Knee Ortho Left with Flexion

## 2019-01-31 ENCOUNTER — OFFICE VISIT (OUTPATIENT)
Dept: INTERNAL MEDICINE | Facility: CLINIC | Age: 69
End: 2019-01-31
Payer: MEDICARE

## 2019-01-31 ENCOUNTER — HOSPITAL ENCOUNTER (OUTPATIENT)
Dept: RADIOLOGY | Facility: HOSPITAL | Age: 69
Discharge: HOME | End: 2019-01-31
Attending: FAMILY MEDICINE
Payer: MEDICARE

## 2019-01-31 VITALS
DIASTOLIC BLOOD PRESSURE: 72 MMHG | WEIGHT: 290.81 LBS | RESPIRATION RATE: 20 BRPM | OXYGEN SATURATION: 97 % | BODY MASS INDEX: 41.63 KG/M2 | SYSTOLIC BLOOD PRESSURE: 116 MMHG | HEART RATE: 69 BPM | HEIGHT: 70 IN | TEMPERATURE: 97 F

## 2019-01-31 DIAGNOSIS — I10 ESSENTIAL HYPERTENSION: Primary | Chronic | ICD-10-CM

## 2019-01-31 DIAGNOSIS — E78.00 ELEVATED CHOLESTEROL: Chronic | ICD-10-CM

## 2019-01-31 DIAGNOSIS — M17.10 ARTHRITIS OF KNEE: ICD-10-CM

## 2019-01-31 DIAGNOSIS — Z12.5 SCREENING PSA (PROSTATE SPECIFIC ANTIGEN): ICD-10-CM

## 2019-01-31 DIAGNOSIS — Z28.9 DELAYED IMMUNIZATIONS: ICD-10-CM

## 2019-01-31 PROCEDURE — 3074F SYST BP LT 130 MM HG: CPT | Mod: CPTII,S$GLB,, | Performed by: FAMILY MEDICINE

## 2019-01-31 PROCEDURE — 73564 X-RAY EXAM KNEE 4 OR MORE: CPT | Mod: 26,LT,, | Performed by: RADIOLOGY

## 2019-01-31 PROCEDURE — 1101F PR PT FALLS ASSESS DOC 0-1 FALLS W/OUT INJ PAST YR: ICD-10-PCS | Mod: CPTII,S$GLB,, | Performed by: FAMILY MEDICINE

## 2019-01-31 PROCEDURE — 99999 PR PBB SHADOW E&M-EST. PATIENT-LVL III: CPT | Mod: PBBFAC,,, | Performed by: FAMILY MEDICINE

## 2019-01-31 PROCEDURE — 73562 XR KNEE ORTHO LEFT WITH FLEXION: ICD-10-PCS | Mod: 26,59,RT, | Performed by: RADIOLOGY

## 2019-01-31 PROCEDURE — 3078F DIAST BP <80 MM HG: CPT | Mod: CPTII,S$GLB,, | Performed by: FAMILY MEDICINE

## 2019-01-31 PROCEDURE — 1101F PT FALLS ASSESS-DOCD LE1/YR: CPT | Mod: CPTII,S$GLB,, | Performed by: FAMILY MEDICINE

## 2019-01-31 PROCEDURE — 3078F PR MOST RECENT DIASTOLIC BLOOD PRESSURE < 80 MM HG: ICD-10-PCS | Mod: CPTII,S$GLB,, | Performed by: FAMILY MEDICINE

## 2019-01-31 PROCEDURE — 73562 X-RAY EXAM OF KNEE 3: CPT | Mod: 26,59,RT, | Performed by: RADIOLOGY

## 2019-01-31 PROCEDURE — 99214 OFFICE O/P EST MOD 30 MIN: CPT | Mod: S$GLB,,, | Performed by: FAMILY MEDICINE

## 2019-01-31 PROCEDURE — 3074F PR MOST RECENT SYSTOLIC BLOOD PRESSURE < 130 MM HG: ICD-10-PCS | Mod: CPTII,S$GLB,, | Performed by: FAMILY MEDICINE

## 2019-01-31 PROCEDURE — 73564 XR KNEE ORTHO LEFT WITH FLEXION: ICD-10-PCS | Mod: 26,LT,, | Performed by: RADIOLOGY

## 2019-01-31 PROCEDURE — 99999 PR PBB SHADOW E&M-EST. PATIENT-LVL III: ICD-10-PCS | Mod: PBBFAC,,, | Performed by: FAMILY MEDICINE

## 2019-01-31 PROCEDURE — 73562 X-RAY EXAM OF KNEE 3: CPT | Mod: TC,PO,LT

## 2019-01-31 PROCEDURE — 99214 PR OFFICE/OUTPT VISIT, EST, LEVL IV, 30-39 MIN: ICD-10-PCS | Mod: S$GLB,,, | Performed by: FAMILY MEDICINE

## 2019-01-31 RX ORDER — LISINOPRIL AND HYDROCHLOROTHIAZIDE 12.5; 2 MG/1; MG/1
1 TABLET ORAL DAILY
Qty: 90 TABLET | Refills: 1 | Status: SHIPPED | OUTPATIENT
Start: 2019-01-31 | End: 2019-06-18 | Stop reason: SDUPTHER

## 2019-01-31 RX ORDER — AMLODIPINE BESYLATE 10 MG/1
10 TABLET ORAL DAILY
Qty: 90 TABLET | Refills: 0 | Status: SHIPPED | OUTPATIENT
Start: 2019-01-31 | End: 2019-04-03 | Stop reason: SDUPTHER

## 2019-01-31 RX ORDER — SIMVASTATIN 40 MG/1
40 TABLET, FILM COATED ORAL NIGHTLY
Qty: 90 TABLET | Refills: 1 | Status: SHIPPED | OUTPATIENT
Start: 2019-01-31 | End: 2019-06-18 | Stop reason: SDUPTHER

## 2019-01-31 NOTE — PROGRESS NOTES
No abnormalities noted except some patellar (knee cap) subluxation.  He would be a candidate for a steroid injection if he would like.  Please feel free to contact my office with any questions.    Ian Duncan MD

## 2019-02-04 RX ORDER — ESOMEPRAZOLE MAGNESIUM 40 MG/1
CAPSULE, DELAYED RELEASE ORAL
Qty: 90 CAPSULE | Refills: 1 | Status: SHIPPED | OUTPATIENT
Start: 2019-02-04 | End: 2019-06-24 | Stop reason: SDUPTHER

## 2019-02-06 ENCOUNTER — OFFICE VISIT (OUTPATIENT)
Dept: INTERNAL MEDICINE | Facility: CLINIC | Age: 69
End: 2019-02-06
Payer: MEDICARE

## 2019-02-06 VITALS
OXYGEN SATURATION: 98 % | SYSTOLIC BLOOD PRESSURE: 118 MMHG | BODY MASS INDEX: 41.95 KG/M2 | TEMPERATURE: 96 F | WEIGHT: 293 LBS | DIASTOLIC BLOOD PRESSURE: 74 MMHG | RESPIRATION RATE: 19 BRPM | HEART RATE: 64 BPM | HEIGHT: 70 IN

## 2019-02-06 DIAGNOSIS — H02.826 CYST OF EYELID, LEFT: ICD-10-CM

## 2019-02-06 DIAGNOSIS — H02.826 CYST OF EYELID, LEFT: Primary | ICD-10-CM

## 2019-02-06 DIAGNOSIS — L82.1 SEBORRHEIC KERATOSIS: ICD-10-CM

## 2019-02-06 PROCEDURE — 99214 PR OFFICE/OUTPT VISIT, EST, LEVL IV, 30-39 MIN: ICD-10-PCS | Mod: 25,S$GLB,, | Performed by: FAMILY MEDICINE

## 2019-02-06 PROCEDURE — 1101F PR PT FALLS ASSESS DOC 0-1 FALLS W/OUT INJ PAST YR: ICD-10-PCS | Mod: CPTII,S$GLB,, | Performed by: FAMILY MEDICINE

## 2019-02-06 PROCEDURE — 99999 PR PBB SHADOW E&M-EST. PATIENT-LVL III: ICD-10-PCS | Mod: PBBFAC,,, | Performed by: FAMILY MEDICINE

## 2019-02-06 PROCEDURE — 3078F DIAST BP <80 MM HG: CPT | Mod: CPTII,S$GLB,, | Performed by: FAMILY MEDICINE

## 2019-02-06 PROCEDURE — 3078F PR MOST RECENT DIASTOLIC BLOOD PRESSURE < 80 MM HG: ICD-10-PCS | Mod: CPTII,S$GLB,, | Performed by: FAMILY MEDICINE

## 2019-02-06 PROCEDURE — 10060 I&D ABSCESS SIMPLE/SINGLE: CPT | Mod: S$GLB,,, | Performed by: FAMILY MEDICINE

## 2019-02-06 PROCEDURE — 3074F PR MOST RECENT SYSTOLIC BLOOD PRESSURE < 130 MM HG: ICD-10-PCS | Mod: CPTII,S$GLB,, | Performed by: FAMILY MEDICINE

## 2019-02-06 PROCEDURE — 10060 PR DRAIN SKIN ABSCESS SIMPLE: ICD-10-PCS | Mod: S$GLB,,, | Performed by: FAMILY MEDICINE

## 2019-02-06 PROCEDURE — 17000 PR DESTRUCTION(LASER SURGERY,CRYOSURGERY,CHEMOSURGERY),PREMALIGNANT LESIONS,FIRST LESION: ICD-10-PCS | Mod: S$GLB,,, | Performed by: FAMILY MEDICINE

## 2019-02-06 PROCEDURE — 99214 OFFICE O/P EST MOD 30 MIN: CPT | Mod: 25,S$GLB,, | Performed by: FAMILY MEDICINE

## 2019-02-06 PROCEDURE — 17000 DESTRUCT PREMALG LESION: CPT | Mod: S$GLB,,, | Performed by: FAMILY MEDICINE

## 2019-02-06 PROCEDURE — 99999 PR PBB SHADOW E&M-EST. PATIENT-LVL III: CPT | Mod: PBBFAC,,, | Performed by: FAMILY MEDICINE

## 2019-02-06 PROCEDURE — 3074F SYST BP LT 130 MM HG: CPT | Mod: CPTII,S$GLB,, | Performed by: FAMILY MEDICINE

## 2019-02-06 PROCEDURE — 1101F PT FALLS ASSESS-DOCD LE1/YR: CPT | Mod: CPTII,S$GLB,, | Performed by: FAMILY MEDICINE

## 2019-02-06 RX ORDER — AMOXICILLIN AND CLAVULANATE POTASSIUM 875; 125 MG/1; MG/1
1 TABLET, FILM COATED ORAL EVERY 12 HOURS
Qty: 14 TABLET | Refills: 0 | Status: SHIPPED | OUTPATIENT
Start: 2019-02-06 | End: 2019-08-01 | Stop reason: ALTCHOICE

## 2019-02-06 RX ORDER — AMOXICILLIN AND CLAVULANATE POTASSIUM 875; 125 MG/1; MG/1
1 TABLET, FILM COATED ORAL EVERY 12 HOURS
Qty: 10 TABLET | Refills: 0 | Status: SHIPPED | OUTPATIENT
Start: 2019-02-06 | End: 2019-02-06 | Stop reason: SDUPTHER

## 2019-02-06 RX ORDER — MUPIROCIN 20 MG/G
OINTMENT TOPICAL 3 TIMES DAILY
Qty: 30 G | Refills: 0 | Status: SHIPPED | OUTPATIENT
Start: 2019-02-06 | End: 2019-02-06 | Stop reason: SDUPTHER

## 2019-02-06 RX ORDER — MUPIROCIN 20 MG/G
OINTMENT TOPICAL 3 TIMES DAILY
Qty: 30 G | Refills: 0 | Status: SHIPPED | OUTPATIENT
Start: 2019-02-06 | End: 2019-08-01 | Stop reason: ALTCHOICE

## 2019-02-06 NOTE — PROCEDURES
"Incision & Drainage  Date/Time: 2/6/2019 3:35 PM  Performed by: Ian Duncan MD  Authorized by: Ian Duncan MD     Time out: Immediately prior to procedure a "time out" was called to verify the correct patient, procedure, equipment, support staff and site/side marked as required.    Consent Done?:  Yes (Verbal)    Type:  Cyst  Body area:  Head/neck  Location details:  Left eyelid  Scalpel size: 25 gauge needle.  Incision type: simple aspiration.  Complexity:  Simple  Wound treatment:  Wound left open  Patient tolerance:  Patient tolerated the procedure well with no immediate complications      "

## 2019-02-06 NOTE — ASSESSMENT & PLAN NOTE
"Foot Care  Date/Time: 2/6/2019 3:45 PM  Performed by: TU SMALLS  Authorized by: TU SMALLS     Time out: Immediately prior to procedure a "time out" was called to verify the correct patient, procedure, equipment, support staff and site/side marked as required.    Consent Done?:  Yes (Verbal)  Skin Lesions?: Yes    Number lesions removed: 1  Location(s):  Left arm  Method: removal via using loop currette with gentle strokes.    Patient tolerance:  Patient tolerated the procedure well with no immediate complications    "

## 2019-02-06 NOTE — PATIENT INSTRUCTIONS
Understanding Seborrheic Keratosis  Seborrheic keratoses are wart-like growths on the skin. These growths are not cancer. Many people get them, especially after age 30.  How to say it  [id number]   What causes seborrheic keratoses?  Doctors do not know what causes seborrheic keratoses. They may run in families. In addition, they become more common as people get older.  What are the symptoms of seborrheic keratoses?  Here is what seborrheic keratoses often look like:  · They tend to be round or oval in shape. They can be very small or about as big across as a quarter.  · They can appear singly or in clusters.  · They tend to be tan, brown, or black in color.  · The edges may be scalloped or uneven-looking.  · They can have a waxy or scaly look.  · They can be a bit raised, appearing to be stuck on the skin.  · They may become red and irritated if scratched or rubbed by clothing  Sebhorreic keratoses are not painful, but they may be itchy. They can become irritated if they are continually rubbed by skin or clothing. Seborrheic keratoses most often appear on the face, arms, chest, back, or belly.  How are seborrheic keratoses treated?  Seborrheic keratoses dont need to be treated unless they bother you. You may choose to have them removed because you find them unattractive. You may also want them removed because they get irritated and uncomfortable. A healthcare provider can remove them in an office visit. Ways that seborrheic keratoses can be removed include:  · Freezing them off with liquid nitrogen  · Cutting them off with a scalpel  · Burning them off with electricity  What are the complications of seborrheic keratoses?  Seborrheic keratoses are not cancer, but they can look like some types of skin cancer. So its a good idea to ask your healthcare provider to check any new skin growths. Ask your healthcare provider about any skin problem that concerns you.  When should I call my healthcare  provider?  Call your healthcare provider right away if any of these occur:  · You develop a lot of seborrheic keratoses very quickly  · You have a sore that does not heal within a few weeks, or heals and then comes back  · You have a mole or skin growth that is changing in size, shape, or color  · You have a mole or skin growth that looks different on one side from the other  · You have a mole or skin growth that is not the same color throughout   Date Last Reviewed: 5/1/2016  © 8855-7625 [employer]. [address] [place] [address]. All rights reserved. This information is not intended as a substitute for professional medical care. Always follow your healthcare professional's instructions.        Wound Care    You have a break in the skin. This wound may be because of an injury. Or it may be the result of surgery. Closing the wound helps stop bleeding, protects the wound from infection, and speeds healing. The type of closure that is used depends on the size and location of the wound. Choices include stitches (sutures), strips of surgical tape, skin glue, or staples.  Home care  Your healthcare provider may prescribe medicines for pain. Or he or she may suggest an over-the-counter (OTC) pain reliever, such as ibuprofen. If you have chronic kidney disease, talk with your provider before taking any OTC medicines. Also talk with your provider if you've had a stomach ulcer or gastrointestinal bleeding. In certain cases, antibiotics may be prescribed to help prevent infection. If antibiotics are prescribed, take them exactly as directed for as long as directed. Do not stop taking your antibiotics until they are all gone, even if you feel better.  General care  · Follow the healthcare providers instructions on how to care for the wound.  · Wash your hands with soap and warm water before and after caring for the wound. This helps prevent infection.  · If a bandage was applied, change it once a day  or as directed. If at any time the bandage becomes wet or dirty, replace it with a new one.  · Unless told otherwise, avoid soaking the wound in water. Take showers or sponge baths instead of tub baths. Don't scrub or pick at the wound.  · Don't go swimming.  · If you have a bandage and it gets wet, use a clean cloth to gently pat the wound dry. Then replace the bandage with a dry one.  · Don't scratch, rub, or pick at the area.  · Watch for the signs of infection listed below. Any wound can get infected, even if you are taking antibiotics. Seek care right away if you see any possible signs of infection.  Care for specific closures  · Sutures. You may want to clean the wound daily after the first 2 to 3 days. To do this, remove the bandage and gently wash the area with soap and warm water. After cleaning, apply a thin layer of antibiotic ointment if recommended. Then apply a new bandage. Sutures on the outside of the skin usually need to be removed by your healthcare provider.  · Surgical tape. Keep the area dry. If it gets wet, blot it dry with a towel. Surgical tape closures usually fall off within 7 to 10 days. If they have not fallen off after 10 days, you can remove them yourself. To remove the tape, use mineral oil or petroleum jelly on a cotton ball to gently rub the adhesive.  · Skin glue. You may shower or bathe as usual, but do not use soaps, lotions, or ointments on the wound area. Do not scrub the wound. After bathing, pat the wound dry with a soft towel. Do not apply liquids like peroxide, ointments, or creams to the wound while the strips or film is in place. Don't scratch, rub, or pick at the strips or film. Don't put tape directly over the strips or film. Skin adhesive film will fall off naturally in 5 to 10 days. If it does not peel off in 10 days, gently rub petroleum jelly or an ointment onto the film.  · Staples. Take showers or sponge baths. Don't take tub baths. Don't use lotions on the wound  area. The area may be cleaned with soap and water 2 to 3 days after the wound was stapled. Don't scrub the wound. Pat it dry with a clean soft cloth or towel. You can use antibiotic ointment if your provider tells you to. Staples will need to be removed by your healthcare provider in 10 to 14 days.  Follow-up care  Follow up with your healthcare provider, or as directed. If you have sutures or staples, return for their removal as directed.  When to seek medical advice  Call your healthcare provider right away if you have signs of infection:  · Fever of 100.4°F (38°C) or higher, or as directed by your healthcare provider  · Increasing pain in the wound  · Increasing redness or swelling  · Pus or bad-smelling drainage from the wound  Also call your provider right away if any of these occur:  · Wound bleeds more than a small amount or wont stop bleeding  · Wound edges come apart  · Numbness or weakness in the wound area that doesnt go away  Date Last Reviewed: 1/7/2016 © 2000-2017 [employer]. [address] [place] [address]. All rights reserved. This information is not intended as a substitute for professional medical care. Always follow your healthcare professional's instructions.        Wound Care  Taking proper care of your wound will help it heal. Your healthcare provider may show you how to clean and dress the wound. He or she will also explain how to tell if the wound is healing normally. If you are unsure of how to take care of the wound, be sure to clarify what dressing to use and how often you should change the bandages. Here are the basic steps.     A wound that's not healing normally may be dark in color or have white streaks.   Wash your hands  Tips for washing your hands include:  · Use liquid soap and lather for 2 minutes. Scrub between your fingers and under your nails.  · Rinse with warm water, keeping your fingers pointing down.  · Use a paper towel to dry your hands and to  turn off the faucet.  Remove the used dressing  Here are suggestions for removing the dressing:  · If dressing changes cause you pain, be sure to take your pain medicine as prescribed by your healthcare provider 30 minutes before dressing changes.  · Set up your supplies.  · Put on disposable gloves if youre dressing a wound for someone else or your wound is infected.  · Loosen the tape by pulling gently toward the wound.  · Gently take off the old dressing. If the dressing is stuck to the wound, moisten it with saline (if available) or clean water.  · If you have a drain or tube in the wound, be careful not to pull on it.  · Remove the dressing 1 layer at a time and put it in a plastic bag.  · Remove your gloves.  Inspect and dress the wound  Check the wound carefully:  · Each time you change the dressing, inspect the wound carefully to be sure its healing normally by making sure your wound appears to be pink and moist, and is free of infection.    · Wash your hands again. Put on a new pair of gloves.  · Clean and dress the wound as directed by your healthcare provider or nurse. Do not put anything in the wound that is not prescribed or directed by your healthcare provider. If you have a drain or tube, be careful not to pull on it. Make sure to secure the drain or tube as well.  · Put all supplies in a plastic bag. Seal the bag and put it in the trash.  · Be sure to wash your hands again.  Call your healthcare provider  Call your healthcare provider if you see any of the following signs of a problem:  · Bleeding that soaks the dressing  · Pink fluid weeping from the wound  · Increased drainage or drainage that is yellow, yellow-green, or foul-smelling  · Increased swelling or pain, or redness or swelling in the skin around the wound  · A change in the color of the wound, or if streaks develop in a direction away from the wound  · The area between any stitches opens up  · An increase in the size of the wound  · A  fever of 100.4°F (38°C) or higher, or as directed by your healthcare provider  · Chills, increased fatigue, or a loss of appetite      Date Last Reviewed: 7/30/2015  © 9893-2478 [employer]. [address] [place] [address], [place] [address]. All rights reserved. This information is not intended as a substitute for professional medical care. Always follow your healthcare professional's instructions.

## 2019-02-06 NOTE — PROGRESS NOTES
"Subjective:       Patient ID: Juan Shepherd is a 68 y.o. male.    Chief Complaint: Follow-up    Lesion to left upper eyelid:  O: 2 months ago  L: left upper eyelid  D: constant, worsening  C: swelling, pain, achy  Trevor:  Exac:  It also obscures vision pt states      Review of Systems   Respiratory: Negative for shortness of breath.    Cardiovascular: Negative for chest pain.   Gastrointestinal: Negative for abdominal pain.       Objective:      Physical Exam   Constitutional: He appears well-developed and well-nourished. No distress.   HENT:   Head: Normocephalic and atraumatic.   Eyes: Conjunctivae are normal. Pupils are equal, round, and reactive to light. Right eye exhibits no discharge. Left eye exhibits no discharge.   Small cystic lesion to left upper eyelid.     Pulmonary/Chest: Effort normal and breath sounds normal. No respiratory distress. He has no wheezes.   Skin: Skin is warm and dry. No rash noted. He is not diaphoretic. No erythema.   Flakey, stuck on appearing lesion to left arm.   Nursing note and vitals reviewed.      Assessment:       1. Cyst of eyelid, left    2. Seborrheic keratosis        Plan:     Problem List Items Addressed This Visit        Ophtho    Cyst of eyelid, left - Primary    Relevant Medications    amoxicillin-clavulanate 875-125mg (AUGMENTIN) 875-125 mg per tablet    mupirocin (BACTROBAN) 2 % ointment    Other Relevant Orders    Incision & Drainage (Completed)    Incision & Drainage (Completed)       Derm    Seborrheic keratosis    Current Assessment & Plan     Foot Care  Date/Time: 2/6/2019 3:45 PM  Performed by: TU SMALLS  Authorized by: TU SMALLS     Time out: Immediately prior to procedure a "time out" was called to verify the correct patient, procedure, equipment, support staff and site/side marked as required.    Consent Done?:  Yes (Verbal)  Skin Lesions?: Yes    Number lesions removed: 1  Location(s):  Left arm  Method: removal via using loop currette with " gentle strokes.    Patient tolerance:  Patient tolerated the procedure well with no immediate complications

## 2019-02-06 NOTE — PROCEDURES
"Incision & Drainage  Date/Time: 2/6/2019 3:47 PM  Performed by: Ian Duncan MD  Authorized by: Ian Duncan MD     Time out: Immediately prior to procedure a "time out" was called to verify the correct patient, procedure, equipment, support staff and site/side marked as required.    Consent Done?:  Yes (Verbal)    Type:  Cyst  Body area:  Head/neck  Location details:  Left eyelid  Scalpel size: small hypodermic needle.  Complexity:  Simple  Drainage:  Serous and bloody  Drainage amount:  Scant  Wound treatment:  Wound left open  Patient tolerance:  Patient tolerated the procedure well with no immediate complications      "

## 2019-04-03 DIAGNOSIS — I10 ESSENTIAL HYPERTENSION: Chronic | ICD-10-CM

## 2019-04-03 RX ORDER — AMLODIPINE BESYLATE 10 MG/1
TABLET ORAL
Qty: 90 TABLET | Refills: 0 | Status: SHIPPED | OUTPATIENT
Start: 2019-04-03 | End: 2019-06-05 | Stop reason: SDUPTHER

## 2019-06-05 DIAGNOSIS — I10 ESSENTIAL HYPERTENSION: Chronic | ICD-10-CM

## 2019-06-05 RX ORDER — AMLODIPINE BESYLATE 10 MG/1
TABLET ORAL
Qty: 90 TABLET | Refills: 0 | Status: SHIPPED | OUTPATIENT
Start: 2019-06-05 | End: 2019-08-01 | Stop reason: SDUPTHER

## 2019-06-18 DIAGNOSIS — E78.00 ELEVATED CHOLESTEROL: Chronic | ICD-10-CM

## 2019-06-18 DIAGNOSIS — I10 ESSENTIAL HYPERTENSION: Chronic | ICD-10-CM

## 2019-06-18 RX ORDER — SIMVASTATIN 40 MG/1
40 TABLET, FILM COATED ORAL NIGHTLY
Qty: 90 TABLET | Refills: 1 | Status: SHIPPED | OUTPATIENT
Start: 2019-06-18 | End: 2019-08-01 | Stop reason: SDUPTHER

## 2019-06-18 RX ORDER — LISINOPRIL AND HYDROCHLOROTHIAZIDE 12.5; 2 MG/1; MG/1
TABLET ORAL
Qty: 14 TABLET | Refills: 0 | Status: SHIPPED | OUTPATIENT
Start: 2019-06-18 | End: 2019-06-26 | Stop reason: SDUPTHER

## 2019-06-25 RX ORDER — ESOMEPRAZOLE MAGNESIUM 40 MG/1
CAPSULE, DELAYED RELEASE ORAL
Qty: 90 CAPSULE | Refills: 1 | Status: SHIPPED | OUTPATIENT
Start: 2019-06-25 | End: 2020-01-23

## 2019-06-26 ENCOUNTER — TELEPHONE (OUTPATIENT)
Dept: INTERNAL MEDICINE | Facility: CLINIC | Age: 69
End: 2019-06-26

## 2019-06-26 DIAGNOSIS — I10 ESSENTIAL HYPERTENSION: Chronic | ICD-10-CM

## 2019-06-26 RX ORDER — LISINOPRIL AND HYDROCHLOROTHIAZIDE 12.5; 2 MG/1; MG/1
1 TABLET ORAL DAILY
Qty: 90 TABLET | Refills: 0 | Status: SHIPPED | OUTPATIENT
Start: 2019-06-26 | End: 2019-08-01 | Stop reason: SDUPTHER

## 2019-06-26 RX ORDER — LISINOPRIL AND HYDROCHLOROTHIAZIDE 12.5; 2 MG/1; MG/1
1 TABLET ORAL DAILY
Qty: 30 TABLET | Refills: 0 | Status: CANCELLED | OUTPATIENT
Start: 2019-06-26

## 2019-06-26 NOTE — TELEPHONE ENCOUNTER
Patient is prescribed lisinopril/hctz 20-12.5mg on 06/18/2019 with only 14 tablets. Was pt suppose to come for visit for addition refills. Please advise

## 2019-08-01 ENCOUNTER — TELEPHONE (OUTPATIENT)
Dept: INTERNAL MEDICINE | Facility: CLINIC | Age: 69
End: 2019-08-01

## 2019-08-01 ENCOUNTER — HOSPITAL ENCOUNTER (OUTPATIENT)
Dept: RADIOLOGY | Facility: HOSPITAL | Age: 69
Discharge: HOME | End: 2019-08-01
Attending: FAMILY MEDICINE
Payer: MEDICARE

## 2019-08-01 ENCOUNTER — OFFICE VISIT (OUTPATIENT)
Dept: INTERNAL MEDICINE | Facility: CLINIC | Age: 69
End: 2019-08-01
Payer: MEDICARE

## 2019-08-01 VITALS
DIASTOLIC BLOOD PRESSURE: 78 MMHG | BODY MASS INDEX: 42.3 KG/M2 | TEMPERATURE: 97 F | OXYGEN SATURATION: 96 % | HEIGHT: 70 IN | RESPIRATION RATE: 12 BRPM | SYSTOLIC BLOOD PRESSURE: 118 MMHG | HEART RATE: 68 BPM | WEIGHT: 295.44 LBS

## 2019-08-01 DIAGNOSIS — M19.049 PRIMARY OSTEOARTHRITIS OF HAND, UNSPECIFIED LATERALITY: ICD-10-CM

## 2019-08-01 DIAGNOSIS — E66.01 MORBID OBESITY: ICD-10-CM

## 2019-08-01 DIAGNOSIS — E78.00 ELEVATED CHOLESTEROL: Chronic | ICD-10-CM

## 2019-08-01 DIAGNOSIS — I10 ESSENTIAL HYPERTENSION: Primary | Chronic | ICD-10-CM

## 2019-08-01 PROCEDURE — 99214 PR OFFICE/OUTPT VISIT, EST, LEVL IV, 30-39 MIN: ICD-10-PCS | Mod: S$GLB,,, | Performed by: FAMILY MEDICINE

## 2019-08-01 PROCEDURE — 3078F DIAST BP <80 MM HG: CPT | Mod: CPTII,S$GLB,, | Performed by: FAMILY MEDICINE

## 2019-08-01 PROCEDURE — 3078F PR MOST RECENT DIASTOLIC BLOOD PRESSURE < 80 MM HG: ICD-10-PCS | Mod: CPTII,S$GLB,, | Performed by: FAMILY MEDICINE

## 2019-08-01 PROCEDURE — 99999 PR PBB SHADOW E&M-EST. PATIENT-LVL III: ICD-10-PCS | Mod: PBBFAC,,, | Performed by: FAMILY MEDICINE

## 2019-08-01 PROCEDURE — 99499 UNLISTED E&M SERVICE: CPT | Mod: S$GLB,,, | Performed by: FAMILY MEDICINE

## 2019-08-01 PROCEDURE — 99214 OFFICE O/P EST MOD 30 MIN: CPT | Mod: S$GLB,,, | Performed by: FAMILY MEDICINE

## 2019-08-01 PROCEDURE — 3074F SYST BP LT 130 MM HG: CPT | Mod: CPTII,S$GLB,, | Performed by: FAMILY MEDICINE

## 2019-08-01 PROCEDURE — 73120 X-RAY EXAM OF HAND: CPT | Mod: 26,50,, | Performed by: RADIOLOGY

## 2019-08-01 PROCEDURE — 1101F PT FALLS ASSESS-DOCD LE1/YR: CPT | Mod: CPTII,S$GLB,, | Performed by: FAMILY MEDICINE

## 2019-08-01 PROCEDURE — 99499 RISK ADDL DX/OHS AUDIT: ICD-10-PCS | Mod: S$GLB,,, | Performed by: FAMILY MEDICINE

## 2019-08-01 PROCEDURE — 3074F PR MOST RECENT SYSTOLIC BLOOD PRESSURE < 130 MM HG: ICD-10-PCS | Mod: CPTII,S$GLB,, | Performed by: FAMILY MEDICINE

## 2019-08-01 PROCEDURE — 73120 X-RAY EXAM OF HAND: CPT | Mod: 50,TC,PO

## 2019-08-01 PROCEDURE — 73120 XR HAND 2 VIEW BILAT: ICD-10-PCS | Mod: 26,50,, | Performed by: RADIOLOGY

## 2019-08-01 PROCEDURE — 99999 PR PBB SHADOW E&M-EST. PATIENT-LVL III: CPT | Mod: PBBFAC,,, | Performed by: FAMILY MEDICINE

## 2019-08-01 PROCEDURE — 1101F PR PT FALLS ASSESS DOC 0-1 FALLS W/OUT INJ PAST YR: ICD-10-PCS | Mod: CPTII,S$GLB,, | Performed by: FAMILY MEDICINE

## 2019-08-01 RX ORDER — DICLOFENAC SODIUM 10 MG/G
2 GEL TOPICAL DAILY
Qty: 100 G | Refills: 2 | Status: SHIPPED | OUTPATIENT
Start: 2019-08-01 | End: 2021-05-11

## 2019-08-01 RX ORDER — SIMVASTATIN 40 MG/1
40 TABLET, FILM COATED ORAL NIGHTLY
Qty: 90 TABLET | Refills: 1 | Status: SHIPPED | OUTPATIENT
Start: 2019-08-01 | End: 2020-01-07

## 2019-08-01 RX ORDER — LISINOPRIL AND HYDROCHLOROTHIAZIDE 12.5; 2 MG/1; MG/1
1 TABLET ORAL DAILY
Qty: 90 TABLET | Refills: 1 | Status: SHIPPED | OUTPATIENT
Start: 2019-08-01 | End: 2020-01-31 | Stop reason: SDUPTHER

## 2019-08-01 RX ORDER — AMLODIPINE BESYLATE 10 MG/1
10 TABLET ORAL DAILY
Qty: 90 TABLET | Refills: 1 | Status: SHIPPED | OUTPATIENT
Start: 2019-08-01 | End: 2020-01-09

## 2019-08-01 NOTE — TELEPHONE ENCOUNTER
----- Message from Ian Duncan MD sent at 8/1/2019 10:26 AM CDT -----  Please call pt with abnormal results. Pt does not need appt at this time, unless they have questions or wish to further discuss.  is severe joint space loss throughout the bilateral DIP joints with some associated central erosive changes and prominent marginal osteophyte production, suggestive of erosive OA.  Cont nsaids, if pain is intolerable, we can send to rheum and see if they have any further options for him

## 2019-08-01 NOTE — PROGRESS NOTES
Subjective:       Patient ID: Juan Shepherd is a 68 y.o. male.    Chief Complaint: Hypertension (6 months)    Hypertension   This is a chronic problem. The current episode started more than 1 year ago. The problem has been resolved since onset. The problem is controlled. Pertinent negatives include no anxiety, blurred vision, chest pain, headaches or shortness of breath. There are no associated agents to hypertension. Risk factors for coronary artery disease include male gender, obesity and dyslipidemia. Past treatments include diuretics and ACE inhibitors. The current treatment provides no improvement. There are no compliance problems.      Review of Systems   Eyes: Negative for blurred vision.   Respiratory: Negative for shortness of breath.    Cardiovascular: Negative for chest pain.   Gastrointestinal: Negative for abdominal pain.   Neurological: Negative for headaches.       Objective:      Physical Exam   Constitutional: He appears well-developed and well-nourished. No distress.   HENT:   Head: Normocephalic and atraumatic.   Cardiovascular: Normal rate, regular rhythm, normal heart sounds and intact distal pulses.   Pulmonary/Chest: Effort normal and breath sounds normal. No respiratory distress. He has no wheezes.   Skin: Skin is warm and dry. No rash noted. He is not diaphoretic. No erythema.   Nursing note and vitals reviewed.      Assessment:       1. Essential hypertension    2. Elevated cholesterol    3. Primary osteoarthritis of hand, unspecified laterality    4. Morbid obesity        Plan:     Problem List Items Addressed This Visit        Cardiac/Vascular    Elevated cholesterol (Chronic)    Relevant Medications    simvastatin (ZOCOR) 40 MG tablet    Hypertension - Primary (Chronic)    Relevant Medications    lisinopril-hydrochlorothiazide (PRINZIDE,ZESTORETIC) 20-12.5 mg per tablet    amLODIPine (NORVASC) 10 MG tablet    Other Relevant Orders    Comprehensive metabolic panel (Completed)        Endocrine    Morbid obesity    Current Assessment & Plan     Do not eat starches. (nothing white)  Stop sugary snacks,  Stop bottled juices, or sodas.  See if you can lose any weight by stopping these items first, then we will re-visit the issue.              Orthopedic    Osteoarthritis of hand    Relevant Medications    diclofenac sodium (VOLTAREN) 1 % Gel    Other Relevant Orders    X-Ray Hand 2 View Bilat

## 2019-08-01 NOTE — PROGRESS NOTES
Please call pt with abnormal results. Pt does not need appt at this time, unless they have questions or wish to further discuss.  is severe joint space loss throughout the bilateral DIP joints with some associated central erosive changes and prominent marginal osteophyte production, suggestive of erosive OA.  Cont nsaids, if pain is intolerable, we can send to rheum and see if they have any further options for him

## 2019-08-01 NOTE — TELEPHONE ENCOUNTER
----- Message from Jim Schwartz sent at 8/1/2019 11:30 AM CDT -----  Contact: Self: [phone]  Type: Patient Call Back    Who called:Self    What is the request in detail:Patient returning missed call to office    Can the clinic reply by [facility]? NO    Would the patient rather a call back or a response via [facility]? Callback  Best call back number:[phone]    Additional Information:N/A

## 2019-08-01 NOTE — PROGRESS NOTES
Patient, Juan Shepherd (MRN #[id number]), presented with a recorded BMI of 42.39 kg/m^2 consistent with the definition of morbid obesity (ICD-10 E66.01). The patient's morbid obesity was monitored, evaluated, addressed and/or treated. This addendum to the medical record is made on 08/01/2019.

## 2020-01-07 DIAGNOSIS — E78.00 ELEVATED CHOLESTEROL: Chronic | ICD-10-CM

## 2020-01-07 RX ORDER — SIMVASTATIN 40 MG/1
TABLET, FILM COATED ORAL
Qty: 90 TABLET | Refills: 4 | Status: SHIPPED | OUTPATIENT
Start: 2020-01-07 | End: 2020-01-31 | Stop reason: SDUPTHER

## 2020-01-09 DIAGNOSIS — I10 ESSENTIAL HYPERTENSION: Chronic | ICD-10-CM

## 2020-01-09 RX ORDER — AMLODIPINE BESYLATE 10 MG/1
TABLET ORAL
Qty: 90 TABLET | Refills: 4 | Status: SHIPPED | OUTPATIENT
Start: 2020-01-09 | End: 2020-01-31 | Stop reason: SDUPTHER

## 2020-01-20 ENCOUNTER — PATIENT OUTREACH (OUTPATIENT)
Dept: ADMINISTRATIVE | Facility: HOSPITAL | Age: 70
End: 2020-01-20

## 2020-01-23 RX ORDER — ESOMEPRAZOLE MAGNESIUM 40 MG/1
CAPSULE, DELAYED RELEASE ORAL
Qty: 90 CAPSULE | Refills: 1 | Status: SHIPPED | OUTPATIENT
Start: 2020-01-23 | End: 2020-01-31 | Stop reason: SDUPTHER

## 2020-01-31 ENCOUNTER — LAB VISIT (OUTPATIENT)
Dept: LAB | Facility: HOSPITAL | Age: 70
End: 2020-01-31
Attending: FAMILY MEDICINE
Payer: MEDICARE

## 2020-01-31 ENCOUNTER — OFFICE VISIT (OUTPATIENT)
Dept: INTERNAL MEDICINE | Facility: CLINIC | Age: 70
End: 2020-01-31
Payer: MEDICARE

## 2020-01-31 VITALS
TEMPERATURE: 96 F | SYSTOLIC BLOOD PRESSURE: 125 MMHG | HEART RATE: 86 BPM | BODY MASS INDEX: 42.2 KG/M2 | RESPIRATION RATE: 18 BRPM | HEIGHT: 70 IN | DIASTOLIC BLOOD PRESSURE: 76 MMHG | WEIGHT: 294.75 LBS | OXYGEN SATURATION: 98 %

## 2020-01-31 DIAGNOSIS — E66.01 MORBID OBESITY: ICD-10-CM

## 2020-01-31 DIAGNOSIS — I10 ESSENTIAL HYPERTENSION: Primary | Chronic | ICD-10-CM

## 2020-01-31 DIAGNOSIS — Z12.5 SCREENING PSA (PROSTATE SPECIFIC ANTIGEN): ICD-10-CM

## 2020-01-31 DIAGNOSIS — M72.2 PLANTAR FASCIITIS, RIGHT: ICD-10-CM

## 2020-01-31 DIAGNOSIS — I10 ESSENTIAL HYPERTENSION: Chronic | ICD-10-CM

## 2020-01-31 DIAGNOSIS — E78.00 ELEVATED CHOLESTEROL: Chronic | ICD-10-CM

## 2020-01-31 LAB
ALBUMIN SERPL BCP-MCNC: 3.7 G/DL (ref 3.5–5.2)
ALP SERPL-CCNC: 82 U/L (ref 55–135)
ALT SERPL W/O P-5'-P-CCNC: 23 U/L (ref 10–44)
ANION GAP SERPL CALC-SCNC: 7 MMOL/L (ref 8–16)
AST SERPL-CCNC: 17 U/L (ref 10–40)
BASOPHILS # BLD AUTO: 0.02 K/UL (ref 0–0.2)
BASOPHILS NFR BLD: 0.4 % (ref 0–1.9)
BILIRUB SERPL-MCNC: 0.5 MG/DL (ref 0.1–1)
BUN SERPL-MCNC: 11 MG/DL (ref 8–23)
CALCIUM SERPL-MCNC: 9.6 MG/DL (ref 8.7–10.5)
CHLORIDE SERPL-SCNC: 102 MMOL/L (ref 95–110)
CO2 SERPL-SCNC: 31 MMOL/L (ref 23–29)
CREAT SERPL-MCNC: 1.1 MG/DL (ref 0.5–1.4)
DIFFERENTIAL METHOD: NORMAL
EOSINOPHIL # BLD AUTO: 0.1 K/UL (ref 0–0.5)
EOSINOPHIL NFR BLD: 1.9 % (ref 0–8)
ERYTHROCYTE [DISTWIDTH] IN BLOOD BY AUTOMATED COUNT: 13.1 % (ref 11.5–14.5)
EST. GFR  (AFRICAN AMERICAN): >60 ML/MIN/1.73 M^2
EST. GFR  (NON AFRICAN AMERICAN): >60 ML/MIN/1.73 M^2
GLUCOSE SERPL-MCNC: 87 MG/DL (ref 70–110)
HCT VFR BLD AUTO: 45.3 % (ref 40–54)
HGB BLD-MCNC: 14.6 G/DL (ref 14–18)
IMM GRANULOCYTES # BLD AUTO: 0.01 K/UL (ref 0–0.04)
IMM GRANULOCYTES NFR BLD AUTO: 0.2 % (ref 0–0.5)
LYMPHOCYTES # BLD AUTO: 1.4 K/UL (ref 1–4.8)
LYMPHOCYTES NFR BLD: 25 % (ref 18–48)
MCH RBC QN AUTO: 30.7 PG (ref 27–31)
MCHC RBC AUTO-ENTMCNC: 32.2 G/DL (ref 32–36)
MCV RBC AUTO: 95 FL (ref 82–98)
MONOCYTES # BLD AUTO: 0.6 K/UL (ref 0.3–1)
MONOCYTES NFR BLD: 11.1 % (ref 4–15)
NEUTROPHILS # BLD AUTO: 3.5 K/UL (ref 1.8–7.7)
NEUTROPHILS NFR BLD: 61.4 % (ref 38–73)
NRBC BLD-RTO: 0 /100 WBC
PLATELET # BLD AUTO: 198 K/UL (ref 150–350)
PMV BLD AUTO: 10 FL (ref 9.2–12.9)
POTASSIUM SERPL-SCNC: 4.6 MMOL/L (ref 3.5–5.1)
PROT SERPL-MCNC: 7.3 G/DL (ref 6–8.4)
RBC # BLD AUTO: 4.76 M/UL (ref 4.6–6.2)
SODIUM SERPL-SCNC: 140 MMOL/L (ref 136–145)
WBC # BLD AUTO: 5.67 K/UL (ref 3.9–12.7)

## 2020-01-31 PROCEDURE — 85025 COMPLETE CBC W/AUTO DIFF WBC: CPT | Mod: HCNC,PO

## 2020-01-31 PROCEDURE — 1159F MED LIST DOCD IN RCRD: CPT | Mod: HCNC,S$GLB,, | Performed by: FAMILY MEDICINE

## 2020-01-31 PROCEDURE — 3074F PR MOST RECENT SYSTOLIC BLOOD PRESSURE < 130 MM HG: ICD-10-PCS | Mod: HCNC,CPTII,S$GLB, | Performed by: FAMILY MEDICINE

## 2020-01-31 PROCEDURE — 1101F PT FALLS ASSESS-DOCD LE1/YR: CPT | Mod: HCNC,CPTII,S$GLB, | Performed by: FAMILY MEDICINE

## 2020-01-31 PROCEDURE — 1126F PR PAIN SEVERITY QUANTIFIED, NO PAIN PRESENT: ICD-10-PCS | Mod: HCNC,S$GLB,, | Performed by: FAMILY MEDICINE

## 2020-01-31 PROCEDURE — 1126F AMNT PAIN NOTED NONE PRSNT: CPT | Mod: HCNC,S$GLB,, | Performed by: FAMILY MEDICINE

## 2020-01-31 PROCEDURE — 36415 COLL VENOUS BLD VENIPUNCTURE: CPT | Mod: HCNC,PO

## 2020-01-31 PROCEDURE — 99999 PR PBB SHADOW E&M-EST. PATIENT-LVL III: CPT | Mod: PBBFAC,HCNC,, | Performed by: FAMILY MEDICINE

## 2020-01-31 PROCEDURE — 1101F PR PT FALLS ASSESS DOC 0-1 FALLS W/OUT INJ PAST YR: ICD-10-PCS | Mod: HCNC,CPTII,S$GLB, | Performed by: FAMILY MEDICINE

## 2020-01-31 PROCEDURE — 84153 ASSAY OF PSA TOTAL: CPT | Mod: HCNC

## 2020-01-31 PROCEDURE — 99214 OFFICE O/P EST MOD 30 MIN: CPT | Mod: HCNC,S$GLB,, | Performed by: FAMILY MEDICINE

## 2020-01-31 PROCEDURE — 3078F DIAST BP <80 MM HG: CPT | Mod: HCNC,CPTII,S$GLB, | Performed by: FAMILY MEDICINE

## 2020-01-31 PROCEDURE — 80061 LIPID PANEL: CPT | Mod: HCNC

## 2020-01-31 PROCEDURE — 3074F SYST BP LT 130 MM HG: CPT | Mod: HCNC,CPTII,S$GLB, | Performed by: FAMILY MEDICINE

## 2020-01-31 PROCEDURE — 99214 PR OFFICE/OUTPT VISIT, EST, LEVL IV, 30-39 MIN: ICD-10-PCS | Mod: HCNC,S$GLB,, | Performed by: FAMILY MEDICINE

## 2020-01-31 PROCEDURE — 3078F PR MOST RECENT DIASTOLIC BLOOD PRESSURE < 80 MM HG: ICD-10-PCS | Mod: HCNC,CPTII,S$GLB, | Performed by: FAMILY MEDICINE

## 2020-01-31 PROCEDURE — 99999 PR PBB SHADOW E&M-EST. PATIENT-LVL III: ICD-10-PCS | Mod: PBBFAC,HCNC,, | Performed by: FAMILY MEDICINE

## 2020-01-31 PROCEDURE — 80053 COMPREHEN METABOLIC PANEL: CPT | Mod: HCNC,PO

## 2020-01-31 PROCEDURE — 1159F PR MEDICATION LIST DOCUMENTED IN MEDICAL RECORD: ICD-10-PCS | Mod: HCNC,S$GLB,, | Performed by: FAMILY MEDICINE

## 2020-01-31 RX ORDER — SIMVASTATIN 40 MG/1
40 TABLET, FILM COATED ORAL NIGHTLY
Qty: 90 TABLET | Refills: 4 | Status: SHIPPED | OUTPATIENT
Start: 2020-01-31 | End: 2020-11-17 | Stop reason: SDUPTHER

## 2020-01-31 RX ORDER — LISINOPRIL AND HYDROCHLOROTHIAZIDE 12.5; 2 MG/1; MG/1
1 TABLET ORAL DAILY
Qty: 90 TABLET | Refills: 1 | Status: SHIPPED | OUTPATIENT
Start: 2020-01-31 | End: 2020-08-14 | Stop reason: SDUPTHER

## 2020-01-31 RX ORDER — ESOMEPRAZOLE MAGNESIUM 40 MG/1
40 CAPSULE, DELAYED RELEASE ORAL DAILY
Qty: 90 CAPSULE | Refills: 1 | Status: SHIPPED | OUTPATIENT
Start: 2020-01-31 | End: 2020-08-17

## 2020-01-31 RX ORDER — AMLODIPINE BESYLATE 10 MG/1
10 TABLET ORAL DAILY
Qty: 90 TABLET | Refills: 4 | Status: SHIPPED | OUTPATIENT
Start: 2020-01-31 | End: 2020-11-17 | Stop reason: SDUPTHER

## 2020-01-31 NOTE — ASSESSMENT & PLAN NOTE
-says his plantar fasciitis interfered with his mobility and exercise however he has shoe inserts with much improvement in his pain. He now golfs  -We discussed incorporating daily aerobic exercise into his daily regimen

## 2020-01-31 NOTE — ASSESSMENT & PLAN NOTE
-reports history of slightly enlarged prostate in the past. Was prescribed flomax by urologist that he stopped taking.  He denies urinary urgency and hesistancy

## 2020-01-31 NOTE — PROGRESS NOTES
" Patient ID: Juan Shepherd is a 69 y.o. male.    Chief Complaint: 6 month f/u HTN    HPI Patient is 69-year-old male here for hypertension follow-up.  Is compliant with blood pressure medications.  Home bps 120s-70/80s. No chest pain, SOB, visual disturbances.    Family History   Problem Relation Age of Onset    Glaucoma Mother        Current Outpatient Medications:     amLODIPine (NORVASC) 10 MG tablet, Take 1 tablet (10 mg total) by mouth once daily., Disp: 90 tablet, Rfl: 4    aspirin (ECOTRIN) 81 MG EC tablet, Take 81 mg by mouth once daily., Disp: , Rfl:     diclofenac sodium (VOLTAREN) 1 % Gel, Apply 2 g topically once daily., Disp: 100 g, Rfl: 2    esomeprazole (NEXIUM) 40 MG capsule, Take 1 capsule (40 mg total) by mouth once daily., Disp: 90 capsule, Rfl: 1    lisinopril-hydrochlorothiazide (PRINZIDE,ZESTORETIC) 20-12.5 mg per tablet, Take 1 tablet by mouth once daily., Disp: 90 tablet, Rfl: 1    simvastatin (ZOCOR) 40 MG tablet, Take 1 tablet (40 mg total) by mouth every evening., Disp: 90 tablet, Rfl: 4    Review of Systems   Constitutional: Negative for chills and fever.   Eyes: Negative for visual disturbance.   Respiratory: Negative for shortness of breath.    Cardiovascular: Negative for chest pain.   Gastrointestinal: Negative for abdominal pain.   Genitourinary: Negative for difficulty urinating, frequency and urgency.       Objective:   /76 (BP Location: Left arm, Patient Position: Sitting, BP Method: Large (Automatic))   Pulse 86   Temp 96 °F (35.6 °C) (Tympanic)   Resp 18   Ht 5' 10" (1.778 m)   Wt 133.7 kg (294 lb 12.1 oz)   SpO2 98%   BMI 42.29 kg/m²      Physical Exam   Constitutional: He is oriented to person, place, and time. He appears well-developed and well-nourished. No distress.   HENT:   Head: Normocephalic and atraumatic.   Eyes: EOM are normal.   Neck: Normal range of motion. Neck supple.   Cardiovascular: Normal rate, regular rhythm, normal heart sounds and intact " distal pulses.   Pulmonary/Chest: Effort normal and breath sounds normal.   Neurological: He is alert and oriented to person, place, and time.   Skin: He is not diaphoretic.       Assessment & Plan     Problem List Items Addressed This Visit        Cardiac/Vascular    Elevated cholesterol (Chronic)    Relevant Medications    simvastatin (ZOCOR) 40 MG tablet    Hypertension - Primary (Chronic)    Current Assessment & Plan     -BP well controlled. continue current medications         Relevant Medications    amLODIPine (NORVASC) 10 MG tablet    lisinopril-hydrochlorothiazide (PRINZIDE,ZESTORETIC) 20-12.5 mg per tablet    Other Relevant Orders    CBC auto differential (Completed)    Comprehensive metabolic panel       Renal/    Screening PSA (prostate specific antigen)    Current Assessment & Plan     -reports history of slightly enlarged prostate in the past. Was prescribed flomax by urologist that he stopped taking.  He denies urinary urgency and hesistancy         Relevant Orders    PSA, Screening       Endocrine    Morbid obesity    Current Assessment & Plan     -says his plantar fasciitis interfered with his mobility and exercise however he has shoe inserts with much improvement in his pain. He now golfs  -We discussed incorporating daily aerobic exercise into his daily regimen          Relevant Orders    Lipid panel       Orthopedic    Plantar fasciitis, right    Current Assessment & Plan     -pain much improved with shoe inserts from [employer]                 Follow up in about 6 months (around 7/31/2020) for Hypertension follow-up.      Disclaimer:  This note may have been prepared using voice recognition software, without extensive proofreading. As such, there could be errors within the text such as sound alike errors.

## 2020-02-01 LAB
CHOLEST SERPL-MCNC: 132 MG/DL (ref 120–199)
CHOLEST/HDLC SERPL: 3.1 {RATIO} (ref 2–5)
COMPLEXED PSA SERPL-MCNC: 2.7 NG/ML (ref 0–4)
HDLC SERPL-MCNC: 43 MG/DL (ref 40–75)
HDLC SERPL: 32.6 % (ref 20–50)
LDLC SERPL CALC-MCNC: 68.8 MG/DL (ref 63–159)
NONHDLC SERPL-MCNC: 89 MG/DL
TRIGL SERPL-MCNC: 101 MG/DL (ref 30–150)

## 2020-07-31 ENCOUNTER — OFFICE VISIT (OUTPATIENT)
Dept: INTERNAL MEDICINE | Facility: CLINIC | Age: 70
End: 2020-07-31
Payer: MEDICARE

## 2020-07-31 VITALS
HEIGHT: 70 IN | DIASTOLIC BLOOD PRESSURE: 80 MMHG | SYSTOLIC BLOOD PRESSURE: 122 MMHG | HEART RATE: 65 BPM | BODY MASS INDEX: 43.33 KG/M2 | TEMPERATURE: 98 F | OXYGEN SATURATION: 99 % | WEIGHT: 302.69 LBS

## 2020-07-31 DIAGNOSIS — I10 ESSENTIAL HYPERTENSION: Primary | Chronic | ICD-10-CM

## 2020-07-31 DIAGNOSIS — E78.00 ELEVATED CHOLESTEROL: Chronic | ICD-10-CM

## 2020-07-31 DIAGNOSIS — Z23 ENCOUNTER FOR ADMINISTRATION OF VACCINE: ICD-10-CM

## 2020-07-31 DIAGNOSIS — Z12.11 SCREEN FOR COLON CANCER: ICD-10-CM

## 2020-07-31 DIAGNOSIS — R22.2 SUBCUTANEOUS MASS OF ABDOMINAL WALL: ICD-10-CM

## 2020-07-31 DIAGNOSIS — E66.01 MORBID OBESITY: ICD-10-CM

## 2020-07-31 PROCEDURE — 3008F PR BODY MASS INDEX (BMI) DOCUMENTED: ICD-10-PCS | Mod: HCNC,CPTII,S$GLB, | Performed by: FAMILY MEDICINE

## 2020-07-31 PROCEDURE — 1101F PR PT FALLS ASSESS DOC 0-1 FALLS W/OUT INJ PAST YR: ICD-10-PCS | Mod: HCNC,CPTII,S$GLB, | Performed by: FAMILY MEDICINE

## 2020-07-31 PROCEDURE — 90471 ZOSTER RECOMBINANT VACCINE: ICD-10-PCS | Mod: HCNC,S$GLB,, | Performed by: FAMILY MEDICINE

## 2020-07-31 PROCEDURE — 99214 OFFICE O/P EST MOD 30 MIN: CPT | Mod: 25,HCNC,S$GLB, | Performed by: FAMILY MEDICINE

## 2020-07-31 PROCEDURE — 3079F PR MOST RECENT DIASTOLIC BLOOD PRESSURE 80-89 MM HG: ICD-10-PCS | Mod: HCNC,CPTII,S$GLB, | Performed by: FAMILY MEDICINE

## 2020-07-31 PROCEDURE — 3008F BODY MASS INDEX DOCD: CPT | Mod: HCNC,CPTII,S$GLB, | Performed by: FAMILY MEDICINE

## 2020-07-31 PROCEDURE — 3074F PR MOST RECENT SYSTOLIC BLOOD PRESSURE < 130 MM HG: ICD-10-PCS | Mod: HCNC,CPTII,S$GLB, | Performed by: FAMILY MEDICINE

## 2020-07-31 PROCEDURE — 90471 IMMUNIZATION ADMIN: CPT | Mod: HCNC,S$GLB,, | Performed by: FAMILY MEDICINE

## 2020-07-31 PROCEDURE — 99999 PR PBB SHADOW E&M-EST. PATIENT-LVL III: ICD-10-PCS | Mod: PBBFAC,HCNC,, | Performed by: FAMILY MEDICINE

## 2020-07-31 PROCEDURE — 1101F PT FALLS ASSESS-DOCD LE1/YR: CPT | Mod: HCNC,CPTII,S$GLB, | Performed by: FAMILY MEDICINE

## 2020-07-31 PROCEDURE — 99999 PR PBB SHADOW E&M-EST. PATIENT-LVL III: CPT | Mod: PBBFAC,HCNC,, | Performed by: FAMILY MEDICINE

## 2020-07-31 PROCEDURE — 3079F DIAST BP 80-89 MM HG: CPT | Mod: HCNC,CPTII,S$GLB, | Performed by: FAMILY MEDICINE

## 2020-07-31 PROCEDURE — 1126F PR PAIN SEVERITY QUANTIFIED, NO PAIN PRESENT: ICD-10-PCS | Mod: HCNC,S$GLB,, | Performed by: FAMILY MEDICINE

## 2020-07-31 PROCEDURE — 3074F SYST BP LT 130 MM HG: CPT | Mod: HCNC,CPTII,S$GLB, | Performed by: FAMILY MEDICINE

## 2020-07-31 PROCEDURE — 1159F PR MEDICATION LIST DOCUMENTED IN MEDICAL RECORD: ICD-10-PCS | Mod: HCNC,S$GLB,, | Performed by: FAMILY MEDICINE

## 2020-07-31 PROCEDURE — 1126F AMNT PAIN NOTED NONE PRSNT: CPT | Mod: HCNC,S$GLB,, | Performed by: FAMILY MEDICINE

## 2020-07-31 PROCEDURE — 90750 HZV VACC RECOMBINANT IM: CPT | Mod: HCNC,S$GLB,, | Performed by: FAMILY MEDICINE

## 2020-07-31 PROCEDURE — 1159F MED LIST DOCD IN RCRD: CPT | Mod: HCNC,S$GLB,, | Performed by: FAMILY MEDICINE

## 2020-07-31 PROCEDURE — 99214 PR OFFICE/OUTPT VISIT, EST, LEVL IV, 30-39 MIN: ICD-10-PCS | Mod: 25,HCNC,S$GLB, | Performed by: FAMILY MEDICINE

## 2020-07-31 PROCEDURE — 90750 ZOSTER RECOMBINANT VACCINE: ICD-10-PCS | Mod: HCNC,S$GLB,, | Performed by: FAMILY MEDICINE

## 2020-07-31 RX ORDER — AMOXICILLIN 875 MG/1
TABLET, FILM COATED ORAL
COMMUNITY
Start: 2020-07-26 | End: 2020-08-21 | Stop reason: ALTCHOICE

## 2020-07-31 NOTE — PROGRESS NOTES
Patient ID: Juan Shepherd is a 69 y.o. male.    Chief Complaint: 6 mth check (HTN)    HPI Patient is 69-year-old male who presents hypertension follow-up.  He reports compliance with amlodipine and lisinopril hydrochlorothiazide.  Monitors blood pressure at home which are normotensive.  Was seen at urgent care about a week ago with left ear pain.  Told pain was due to sinus problems.  He was prescribed amoxicillin. Takes zyrtec few days per week. Does not take Flonase as often.    He is due for repeat colonoscopy. Does not recall exact date of prior colonoscopy.  No polyps were found.       Reports small bulge near his umbilicus that he noticed a month ago.  Nonpainful.  No change in his bowel habits.      Family History   Problem Relation Age of Onset    Glaucoma Mother        Current Outpatient Medications:     amLODIPine (NORVASC) 10 MG tablet, Take 1 tablet (10 mg total) by mouth once daily., Disp: 90 tablet, Rfl: 4    amoxicillin (AMOXIL) 875 MG tablet, , Disp: , Rfl:     aspirin (ECOTRIN) 81 MG EC tablet, Take 81 mg by mouth once daily., Disp: , Rfl:     diclofenac sodium (VOLTAREN) 1 % Gel, Apply 2 g topically once daily., Disp: 100 g, Rfl: 2    esomeprazole (NEXIUM) 40 MG capsule, Take 1 capsule (40 mg total) by mouth once daily., Disp: 90 capsule, Rfl: 1    lisinopril-hydrochlorothiazide (PRINZIDE,ZESTORETIC) 20-12.5 mg per tablet, Take 1 tablet by mouth once daily., Disp: 90 tablet, Rfl: 1    simvastatin (ZOCOR) 40 MG tablet, Take 1 tablet (40 mg total) by mouth every evening., Disp: 90 tablet, Rfl: 4    Review of Systems   Constitutional: Negative for chills and fever.   HENT: Negative for congestion and sore throat.    Eyes: Negative for visual disturbance.   Respiratory: Negative for cough, shortness of breath and wheezing.    Cardiovascular: Negative for chest pain.   Gastrointestinal: Negative for abdominal pain, diarrhea and nausea.   Endocrine: Negative for polydipsia and polyuria.  "  Musculoskeletal: Negative for arthralgias.   Skin: Negative for rash.   Neurological: Negative for dizziness and headaches.   Psychiatric/Behavioral: Negative for sleep disturbance. The patient is not nervous/anxious.        Objective:   /80 (BP Location: Left arm, Patient Position: Sitting, BP Method: X-Large (Manual))   Pulse 65   Temp 98.4 °F (36.9 °C)   Ht 5' 10" (1.778 m)   Wt (!) 137.3 kg (302 lb 11.1 oz)   SpO2 99%   BMI 43.43 kg/m²      Physical Exam  Constitutional:       Appearance: He is well-developed. He is obese.   HENT:      Head: Normocephalic and atraumatic.   Eyes:      Conjunctiva/sclera: Conjunctivae normal.   Neck:      Musculoskeletal: Normal range of motion.   Cardiovascular:      Rate and Rhythm: Normal rate and regular rhythm.      Heart sounds: Normal heart sounds.   Pulmonary:      Effort: Pulmonary effort is normal.      Breath sounds: Normal breath sounds.   Abdominal:      General: Bowel sounds are normal. There is no distension.      Palpations: Abdomen is soft.      Tenderness: There is no abdominal tenderness. There is no guarding.      Comments: Small subcutaneous mass proximal to umbilicus more prominent with patient lying supine. Non ttp   Musculoskeletal: Normal range of motion.   Skin:     General: Skin is warm.   Neurological:      Mental Status: He is alert and oriented to person, place, and time.   Psychiatric:         Mood and Affect: Mood normal.         Behavior: Behavior normal.         Thought Content: Thought content normal.         Judgment: Judgment normal.         Assessment & Plan     Problem List Items Addressed This Visit        Cardiac/Vascular    Elevated cholesterol (Chronic)    Current Assessment & Plan     -continue statin         Hypertension - Primary (Chronic)    Current Assessment & Plan     -controlled. Continue current meds            Endocrine    Morbid obesity    Current Assessment & Plan     -reports gaining weight since covid. He has " been more sedentary.            Other Visit Diagnoses     Encounter for administration of vaccine        Relevant Orders    (In Office Administered) Zoster Recombinant Vaccine (Completed)    Screen for colon cancer        Relevant Orders    Case request GI: COLONOSCOPY (Completed)    Subcutaneous mass of abdominal wall        -suspect lipoma versus hernia. not bothersome to patient at this time. will monitor for now            Follow up in about 6 months (around 1/31/2021) for Hypertension follow-up.      Disclaimer:  This note may have been prepared using voice recognition software, without extensive proofreading. As such, there could be errors within the text such as sound alike errors.

## 2020-08-10 ENCOUNTER — TELEPHONE (OUTPATIENT)
Dept: ENDOSCOPY | Facility: HOSPITAL | Age: 70
End: 2020-08-10

## 2020-08-12 ENCOUNTER — TELEPHONE (OUTPATIENT)
Dept: INTERNAL MEDICINE | Facility: CLINIC | Age: 70
End: 2020-08-12

## 2020-08-12 NOTE — TELEPHONE ENCOUNTER
----- Message from Christiana Pond sent at 8/12/2020 10:58 AM CDT -----  Would like to consult with nurse regarding him receiving a message from [facility] saying they can longer give his medication unless he get a new prescription. Please give a call back at [phone] or [phone].

## 2020-08-12 NOTE — TELEPHONE ENCOUNTER
Tried reaching out to patient regarding his message. Patient unavailable, message left for him to call the office back.

## 2020-08-14 DIAGNOSIS — I10 ESSENTIAL HYPERTENSION: Chronic | ICD-10-CM

## 2020-08-14 RX ORDER — LISINOPRIL AND HYDROCHLOROTHIAZIDE 12.5; 2 MG/1; MG/1
1 TABLET ORAL DAILY
Qty: 90 TABLET | Refills: 0 | Status: SHIPPED | OUTPATIENT
Start: 2020-08-14 | End: 2020-11-17 | Stop reason: SDUPTHER

## 2020-08-14 NOTE — TELEPHONE ENCOUNTER
----- Message from Estela Ortiz sent at 8/14/2020 12:34 PM CDT -----  Type:  RX Refill Request    Who Called: pt  Refill or New Rx:New Rx  RX Name and Strength:lisinopril/hctz 20-12.5 mg  How is the patient currently taking it? (ex. 1XDay):1XDay  Is this a 30 day or 90 day RX:90  Preferred Pharmacy with phone number:.  [facility] [address]  [address] [address]  Phone: [phone] Fax: [phone]  Local or Mail Order:mail order  Ordering Provider:Dr Ruano  Would the patient rather a call back or a response via [facility]? Call back  Best Call Back Number:[phone]  Additional Information: #    Thank you

## 2020-08-19 ENCOUNTER — TELEPHONE (OUTPATIENT)
Dept: INTERNAL MEDICINE | Facility: CLINIC | Age: 70
End: 2020-08-19

## 2020-08-19 NOTE — TELEPHONE ENCOUNTER
Call and informed patient pre op clearance requirements received through fax. Scheduled patient appt this Friday 08/21/2020 with Soo MORENO. Fc

## 2020-08-19 NOTE — TELEPHONE ENCOUNTER
----- Message from Anamaria Campbell sent at 8/19/2020  1:22 PM CDT -----  Regarding: Pre-op clearance testing  Contact: Patient  Please call patient and advise if fax was received specifying what test  need to be completed for his upcoming hernia surgery( Pre-op clearance) . Please call at Ph .[phone]

## 2020-08-19 NOTE — TELEPHONE ENCOUNTER
Informed patient no fax has been received regarding pre op clearance. Fax number given to patient and he states he will call Dr. Farrell office and have them refax paperwork. Fc

## 2020-08-21 ENCOUNTER — HOSPITAL ENCOUNTER (OUTPATIENT)
Dept: CARDIOLOGY | Facility: HOSPITAL | Age: 70
Discharge: HOME | End: 2020-08-21
Payer: MEDICARE

## 2020-08-21 ENCOUNTER — OFFICE VISIT (OUTPATIENT)
Dept: INTERNAL MEDICINE | Facility: CLINIC | Age: 70
End: 2020-08-21
Payer: MEDICARE

## 2020-08-21 VITALS
DIASTOLIC BLOOD PRESSURE: 72 MMHG | WEIGHT: 301.56 LBS | TEMPERATURE: 98 F | HEIGHT: 70 IN | SYSTOLIC BLOOD PRESSURE: 126 MMHG | OXYGEN SATURATION: 98 % | RESPIRATION RATE: 16 BRPM | BODY MASS INDEX: 43.17 KG/M2 | HEART RATE: 65 BPM

## 2020-08-21 DIAGNOSIS — Z01.818 PRE-OP EXAM: ICD-10-CM

## 2020-08-21 DIAGNOSIS — Z01.818 PRE-OPERATIVE CLEARANCE: Primary | ICD-10-CM

## 2020-08-21 PROBLEM — H02.826: Status: RESOLVED | Noted: 2019-02-06 | Resolved: 2020-08-21

## 2020-08-21 PROCEDURE — 1159F MED LIST DOCD IN RCRD: CPT | Mod: HCNC,S$GLB,, | Performed by: NURSE PRACTITIONER

## 2020-08-21 PROCEDURE — 3074F PR MOST RECENT SYSTOLIC BLOOD PRESSURE < 130 MM HG: ICD-10-PCS | Mod: HCNC,CPTII,S$GLB, | Performed by: NURSE PRACTITIONER

## 2020-08-21 PROCEDURE — 3078F PR MOST RECENT DIASTOLIC BLOOD PRESSURE < 80 MM HG: ICD-10-PCS | Mod: HCNC,CPTII,S$GLB, | Performed by: NURSE PRACTITIONER

## 2020-08-21 PROCEDURE — 3008F PR BODY MASS INDEX (BMI) DOCUMENTED: ICD-10-PCS | Mod: HCNC,CPTII,S$GLB, | Performed by: NURSE PRACTITIONER

## 2020-08-21 PROCEDURE — 93005 ELECTROCARDIOGRAM TRACING: CPT | Mod: HCNC,PO

## 2020-08-21 PROCEDURE — 99999 PR PBB SHADOW E&M-EST. PATIENT-LVL III: ICD-10-PCS | Mod: PBBFAC,HCNC,, | Performed by: NURSE PRACTITIONER

## 2020-08-21 PROCEDURE — 1101F PR PT FALLS ASSESS DOC 0-1 FALLS W/OUT INJ PAST YR: ICD-10-PCS | Mod: HCNC,CPTII,S$GLB, | Performed by: NURSE PRACTITIONER

## 2020-08-21 PROCEDURE — 1159F PR MEDICATION LIST DOCUMENTED IN MEDICAL RECORD: ICD-10-PCS | Mod: HCNC,S$GLB,, | Performed by: NURSE PRACTITIONER

## 2020-08-21 PROCEDURE — 3078F DIAST BP <80 MM HG: CPT | Mod: HCNC,CPTII,S$GLB, | Performed by: NURSE PRACTITIONER

## 2020-08-21 PROCEDURE — 3074F SYST BP LT 130 MM HG: CPT | Mod: HCNC,CPTII,S$GLB, | Performed by: NURSE PRACTITIONER

## 2020-08-21 PROCEDURE — 99214 PR OFFICE/OUTPT VISIT, EST, LEVL IV, 30-39 MIN: ICD-10-PCS | Mod: HCNC,S$GLB,, | Performed by: NURSE PRACTITIONER

## 2020-08-21 PROCEDURE — 93010 ELECTROCARDIOGRAM REPORT: CPT | Mod: HCNC,S$GLB,, | Performed by: INTERNAL MEDICINE

## 2020-08-21 PROCEDURE — 93010 EKG 12-LEAD: ICD-10-PCS | Mod: HCNC,S$GLB,, | Performed by: INTERNAL MEDICINE

## 2020-08-21 PROCEDURE — 1101F PT FALLS ASSESS-DOCD LE1/YR: CPT | Mod: HCNC,CPTII,S$GLB, | Performed by: NURSE PRACTITIONER

## 2020-08-21 PROCEDURE — 99999 PR PBB SHADOW E&M-EST. PATIENT-LVL III: CPT | Mod: PBBFAC,HCNC,, | Performed by: NURSE PRACTITIONER

## 2020-08-21 PROCEDURE — 99214 OFFICE O/P EST MOD 30 MIN: CPT | Mod: HCNC,S$GLB,, | Performed by: NURSE PRACTITIONER

## 2020-08-21 PROCEDURE — 3008F BODY MASS INDEX DOCD: CPT | Mod: HCNC,CPTII,S$GLB, | Performed by: NURSE PRACTITIONER

## 2020-08-21 NOTE — PROGRESS NOTES
Subjective:       Patient ID: Juan Shepherd is a 69 y.o. male.    Chief Complaint: Pre-op Exam    Patient presents with:  Pre-op Exam    Mr Juan Shepherd is here today for preop examination and clearance.  I have reviewed the patient's medical history in detail and updated the computerized patient record.  Denies any previous complications related to previous surgery or anesthesia.      Surgery: Laparoscopic Ventral hernia repair  MD: Mesfin Sierra  Date: 08/26/2020  Preop Testing: CMP, CBC, EKG      Patient Active Problem List   Diagnosis    Mixed hyperlipidemia    Hypertension    Family history of glaucoma    Age-related nuclear cataract of both eyes    Morbid obesity    Thumb pain, right    Pre-operative clearance    Screening PSA (prostate specific antigen)    Delayed immunizations    Arthritis of knee    Seborrheic keratosis    Osteoarthritis of hand    Plantar fasciitis, right       Family History   Problem Relation Age of Onset    Glaucoma Mother      Past Surgical History:   Procedure Laterality Date    CHOLECYSTECTOMY      COLONOSCOPY      FINGER SURGERY      HAND SURGERY           Current Outpatient Medications:     amLODIPine (NORVASC) 10 MG tablet, Take 1 tablet (10 mg total) by mouth once daily., Disp: 90 tablet, Rfl: 4    aspirin (ECOTRIN) 81 MG EC tablet, Take 81 mg by mouth once daily., Disp: , Rfl:     diclofenac sodium (VOLTAREN) 1 % Gel, Apply 2 g topically once daily., Disp: 100 g, Rfl: 2    esomeprazole (NEXIUM) 40 MG capsule, TAKE 1 CAPSULE EVERY DAY, Disp: 90 capsule, Rfl: 1    lisinopriL-hydrochlorothiazide (PRINZIDE,ZESTORETIC) 20-12.5 mg per tablet, Take 1 tablet by mouth once daily., Disp: 90 tablet, Rfl: 0    simvastatin (ZOCOR) 40 MG tablet, Take 1 tablet (40 mg total) by mouth every evening., Disp: 90 tablet, Rfl: 4    Review of Systems   Constitutional: Negative for appetite change, chills, fatigue and fever.   Respiratory: Negative for cough and shortness of  "breath.    Cardiovascular: Negative for chest pain and palpitations.   Gastrointestinal: Negative for abdominal pain, nausea and vomiting.   Musculoskeletal: Negative for back pain, gait problem and myalgias.   Neurological: Negative for dizziness, light-headedness and headaches.       Objective:   /72 (BP Location: Left arm, Patient Position: Sitting, BP Method: Large (Manual))   Pulse 65   Temp 98.1 °F (36.7 °C) (Temporal)   Resp 16   Ht 5' 10" (1.778 m)   Wt (!) 136.8 kg (301 lb 9.4 oz)   SpO2 98%   BMI 43.27 kg/m²      Physical Exam  Constitutional:       General: He is not in acute distress.     Appearance: Normal appearance. He is obese. He is not ill-appearing.   HENT:      Head: Normocephalic and atraumatic.   Neck:      Musculoskeletal: Normal range of motion and neck supple.   Cardiovascular:      Rate and Rhythm: Normal rate and regular rhythm.      Pulses: Normal pulses.      Heart sounds: Normal heart sounds. No murmur. No friction rub. No gallop.    Pulmonary:      Effort: Pulmonary effort is normal. No respiratory distress.      Breath sounds: Normal breath sounds.   Abdominal:      Tenderness: There is abdominal tenderness (mild on area of umbilical hernia). There is no guarding.      Hernia: A hernia is present.   Musculoskeletal: Normal range of motion.   Lymphadenopathy:      Cervical: No cervical adenopathy.   Skin:     General: Skin is warm and dry.   Neurological:      Mental Status: He is alert and oriented to person, place, and time.   Psychiatric:         Mood and Affect: Mood normal.         Behavior: Behavior normal.         Assessment & Plan   0.1% risk of perioperative mortality with 4 points of Preoperative Mortality Predictor Score. I do not see any contraindications at this time for pre-op clearance. EKG and labs reviewed.     Problem List Items Addressed This Visit        Other    Pre-operative clearance - Primary    Current Assessment & Plan     Pt cleared for surgery. " "Labs and EKG reviewed. No significant changes from previous EKG. Labs normal.         Relevant Orders    CBC auto differential (Completed)    Comprehensive metabolic panel (Completed)    IN OFFICE EKG 12-LEAD (to [place]) (Completed)      Other Visit Diagnoses     Pre-op exam        Relevant Orders    IN OFFICE EKG 12-LEAD (to [place]) (Completed)           No follow-ups on file.          Portions of this note may have been created with voice recognition software. Occasional "wrong-word" or "sound-a-like" substitutions may have occurred due to the inherent limitations of voice recognition software. Please, read the note carefully and recognize, using context, where substitutions have occurred.       "

## 2020-08-24 NOTE — ASSESSMENT & PLAN NOTE
Pt cleared for surgery. Labs and EKG reviewed. No significant changes from previous EKG. Labs normal.

## 2020-09-29 ENCOUNTER — PATIENT MESSAGE (OUTPATIENT)
Dept: OTHER | Facility: OTHER | Age: 70
End: 2020-09-29

## 2020-11-17 DIAGNOSIS — E78.00 ELEVATED CHOLESTEROL: Chronic | ICD-10-CM

## 2020-11-17 DIAGNOSIS — I10 ESSENTIAL HYPERTENSION: Chronic | ICD-10-CM

## 2020-11-17 RX ORDER — SIMVASTATIN 40 MG/1
40 TABLET, FILM COATED ORAL NIGHTLY
Qty: 90 TABLET | Refills: 4 | Status: SHIPPED | OUTPATIENT
Start: 2020-11-17 | End: 2021-11-26

## 2020-11-17 RX ORDER — LISINOPRIL AND HYDROCHLOROTHIAZIDE 12.5; 2 MG/1; MG/1
1 TABLET ORAL DAILY
Qty: 90 TABLET | Refills: 0 | Status: SHIPPED | OUTPATIENT
Start: 2020-11-17 | End: 2020-11-27 | Stop reason: SDUPTHER

## 2020-11-17 RX ORDER — AMLODIPINE BESYLATE 10 MG/1
10 TABLET ORAL DAILY
Qty: 90 TABLET | Refills: 4 | Status: SHIPPED | OUTPATIENT
Start: 2020-11-17 | End: 2021-11-26 | Stop reason: SDUPTHER

## 2020-11-17 NOTE — TELEPHONE ENCOUNTER
----- Message from Carlene Rodriguez sent at 11/17/2020 11:36 AM CST -----  Regarding: self [phone]  .Type: RX Refill Request    Who Called:  self     Have you contacted your pharmacy: yes     Refill or New Rx: refill     Is this a 30 day or 90 day RX: 90 day    Preferred Pharmacy with phone number: .  [facility] - [place], [place] - [address]  [address] [address]  Phone: [phone] Fax: [phone]    Local or Mail Order: local    Would the patient rather a call back or a response via [facility]? Call back     Best Call Back Number [phone]

## 2020-11-27 ENCOUNTER — OFFICE VISIT (OUTPATIENT)
Dept: INTERNAL MEDICINE | Facility: CLINIC | Age: 70
End: 2020-11-27
Payer: MEDICARE

## 2020-11-27 VITALS
DIASTOLIC BLOOD PRESSURE: 80 MMHG | HEART RATE: 65 BPM | TEMPERATURE: 98 F | HEIGHT: 70 IN | SYSTOLIC BLOOD PRESSURE: 128 MMHG | OXYGEN SATURATION: 96 % | BODY MASS INDEX: 43.33 KG/M2 | WEIGHT: 302.69 LBS | RESPIRATION RATE: 19 BRPM

## 2020-11-27 DIAGNOSIS — I10 ESSENTIAL HYPERTENSION: Chronic | ICD-10-CM

## 2020-11-27 DIAGNOSIS — E66.01 MORBID OBESITY: ICD-10-CM

## 2020-11-27 DIAGNOSIS — E78.2 MIXED HYPERLIPIDEMIA: ICD-10-CM

## 2020-11-27 PROBLEM — Z12.5 SCREENING PSA (PROSTATE SPECIFIC ANTIGEN): Status: RESOLVED | Noted: 2019-01-29 | Resolved: 2020-11-27

## 2020-11-27 PROBLEM — Z01.818 PRE-OPERATIVE CLEARANCE: Status: RESOLVED | Noted: 2018-07-30 | Resolved: 2020-11-27

## 2020-11-27 PROCEDURE — 99999 PR PBB SHADOW E&M-EST. PATIENT-LVL III: CPT | Mod: PBBFAC,HCNC,, | Performed by: FAMILY MEDICINE

## 2020-11-27 PROCEDURE — 90471 IMMUNIZATION ADMIN: CPT | Mod: HCNC,S$GLB,, | Performed by: FAMILY MEDICINE

## 2020-11-27 PROCEDURE — 3079F PR MOST RECENT DIASTOLIC BLOOD PRESSURE 80-89 MM HG: ICD-10-PCS | Mod: HCNC,CPTII,S$GLB, | Performed by: FAMILY MEDICINE

## 2020-11-27 PROCEDURE — 99214 PR OFFICE/OUTPT VISIT, EST, LEVL IV, 30-39 MIN: ICD-10-PCS | Mod: HCNC,25,S$GLB, | Performed by: FAMILY MEDICINE

## 2020-11-27 PROCEDURE — 99999 PR PBB SHADOW E&M-EST. PATIENT-LVL III: ICD-10-PCS | Mod: PBBFAC,HCNC,, | Performed by: FAMILY MEDICINE

## 2020-11-27 PROCEDURE — 3074F PR MOST RECENT SYSTOLIC BLOOD PRESSURE < 130 MM HG: ICD-10-PCS | Mod: HCNC,CPTII,S$GLB, | Performed by: FAMILY MEDICINE

## 2020-11-27 PROCEDURE — 1101F PT FALLS ASSESS-DOCD LE1/YR: CPT | Mod: HCNC,CPTII,S$GLB, | Performed by: FAMILY MEDICINE

## 2020-11-27 PROCEDURE — 99499 RISK ADDL DX/OHS AUDIT: ICD-10-PCS | Mod: S$GLB,,, | Performed by: FAMILY MEDICINE

## 2020-11-27 PROCEDURE — 99499 UNLISTED E&M SERVICE: CPT | Mod: S$GLB,,, | Performed by: FAMILY MEDICINE

## 2020-11-27 PROCEDURE — 3288F PR FALLS RISK ASSESSMENT DOCUMENTED: ICD-10-PCS | Mod: HCNC,CPTII,S$GLB, | Performed by: FAMILY MEDICINE

## 2020-11-27 PROCEDURE — 1159F PR MEDICATION LIST DOCUMENTED IN MEDICAL RECORD: ICD-10-PCS | Mod: HCNC,S$GLB,, | Performed by: FAMILY MEDICINE

## 2020-11-27 PROCEDURE — 3079F DIAST BP 80-89 MM HG: CPT | Mod: HCNC,CPTII,S$GLB, | Performed by: FAMILY MEDICINE

## 2020-11-27 PROCEDURE — 1126F PR PAIN SEVERITY QUANTIFIED, NO PAIN PRESENT: ICD-10-PCS | Mod: HCNC,S$GLB,, | Performed by: FAMILY MEDICINE

## 2020-11-27 PROCEDURE — 1159F MED LIST DOCD IN RCRD: CPT | Mod: HCNC,S$GLB,, | Performed by: FAMILY MEDICINE

## 2020-11-27 PROCEDURE — 3288F FALL RISK ASSESSMENT DOCD: CPT | Mod: HCNC,CPTII,S$GLB, | Performed by: FAMILY MEDICINE

## 2020-11-27 PROCEDURE — 3074F SYST BP LT 130 MM HG: CPT | Mod: HCNC,CPTII,S$GLB, | Performed by: FAMILY MEDICINE

## 2020-11-27 PROCEDURE — 99214 OFFICE O/P EST MOD 30 MIN: CPT | Mod: HCNC,25,S$GLB, | Performed by: FAMILY MEDICINE

## 2020-11-27 PROCEDURE — 90471 ZOSTER RECOMBINANT VACCINE: ICD-10-PCS | Mod: HCNC,S$GLB,, | Performed by: FAMILY MEDICINE

## 2020-11-27 PROCEDURE — 3008F PR BODY MASS INDEX (BMI) DOCUMENTED: ICD-10-PCS | Mod: HCNC,CPTII,S$GLB, | Performed by: FAMILY MEDICINE

## 2020-11-27 PROCEDURE — 1126F AMNT PAIN NOTED NONE PRSNT: CPT | Mod: HCNC,S$GLB,, | Performed by: FAMILY MEDICINE

## 2020-11-27 PROCEDURE — 3008F BODY MASS INDEX DOCD: CPT | Mod: HCNC,CPTII,S$GLB, | Performed by: FAMILY MEDICINE

## 2020-11-27 PROCEDURE — 1101F PR PT FALLS ASSESS DOC 0-1 FALLS W/OUT INJ PAST YR: ICD-10-PCS | Mod: HCNC,CPTII,S$GLB, | Performed by: FAMILY MEDICINE

## 2020-11-27 PROCEDURE — 90750 HZV VACC RECOMBINANT IM: CPT | Mod: HCNC,S$GLB,, | Performed by: FAMILY MEDICINE

## 2020-11-27 PROCEDURE — 90750 ZOSTER RECOMBINANT VACCINE: ICD-10-PCS | Mod: HCNC,S$GLB,, | Performed by: FAMILY MEDICINE

## 2020-11-27 RX ORDER — LISINOPRIL AND HYDROCHLOROTHIAZIDE 12.5; 2 MG/1; MG/1
1 TABLET ORAL DAILY
Qty: 90 TABLET | Refills: 3 | Status: SHIPPED | OUTPATIENT
Start: 2020-11-27 | End: 2021-11-26 | Stop reason: SDUPTHER

## 2020-11-27 NOTE — PROGRESS NOTES
"Subjective:       Patient ID: Juan Shepherd is a 70 y.o. male.    Chief Complaint: Establish Care    HPI  Here today to establish care.  Has past medical history significant for hypertension dyslipidemia, osteoarthritis of his hands and obesity.  No new complaints at this time.    Due for 3rd and final shingles vaccine.    In regards to colon cancer screening was told that his last colonoscopy was completely normal and he would prefer to do stool testing at home since transportation is a challenge for him.    Family History   Problem Relation Age of Onset    Glaucoma Mother        Current Outpatient Medications:     amLODIPine (NORVASC) 10 MG tablet, Take 1 tablet (10 mg total) by mouth once daily., Disp: 90 tablet, Rfl: 4    aspirin (ECOTRIN) 81 MG EC tablet, Take 81 mg by mouth once daily., Disp: , Rfl:     diclofenac sodium (VOLTAREN) 1 % Gel, Apply 2 g topically once daily., Disp: 100 g, Rfl: 2    esomeprazole (NEXIUM) 40 MG capsule, TAKE 1 CAPSULE EVERY DAY, Disp: 90 capsule, Rfl: 1    lisinopriL-hydrochlorothiazide (PRINZIDE,ZESTORETIC) 20-12.5 mg per tablet, Take 1 tablet by mouth once daily., Disp: 90 tablet, Rfl: 3    simvastatin (ZOCOR) 40 MG tablet, Take 1 tablet (40 mg total) by mouth every evening., Disp: 90 tablet, Rfl: 4    Review of Systems   Constitutional: Negative for chills and fever.   Eyes: Negative for visual disturbance.   Respiratory: Negative for cough and shortness of breath.    Cardiovascular: Negative for chest pain.   Gastrointestinal: Negative for abdominal pain.   Musculoskeletal: Positive for arthralgias.   Neurological: Negative for dizziness.       Objective:   /80 (BP Location: Left arm, Patient Position: Sitting, BP Method: Large (Manual))   Pulse 65   Temp 98.2 °F (36.8 °C) (Temporal)   Resp 19   Ht 5' 10" (1.778 m)   Wt (!) 137.3 kg (302 lb 11.1 oz)   SpO2 96%   BMI 43.43 kg/m²      Physical Exam  Vitals signs reviewed.   Constitutional:       Appearance: He " is well-developed.   HENT:      Head: Normocephalic and atraumatic.   Eyes:      Conjunctiva/sclera: Conjunctivae normal.   Cardiovascular:      Rate and Rhythm: Normal rate.   Pulmonary:      Effort: Pulmonary effort is normal. No respiratory distress.   Skin:     General: Skin is warm and dry.      Findings: No rash.   Neurological:      Mental Status: He is alert and oriented to person, place, and time.      Coordination: Coordination normal.   Psychiatric:         Behavior: Behavior normal.         Assessment & Plan     Problem List Items Addressed This Visit        Cardiac/Vascular    Hypertension (Chronic)    Current Assessment & Plan     Stable. Continue same medications         Relevant Medications    lisinopriL-hydrochlorothiazide (PRINZIDE,ZESTORETIC) 20-12.5 mg per tablet    Mixed hyperlipidemia    Current Assessment & Plan     Continue on zocor.             Endocrine    Morbid obesity    Current Assessment & Plan     Encouraged to try getting active again to help with weight loss                 Immunizations Administered on Date of Encounter - 11/27/2020     Name Date Dose VIS Date Route Exp Date    Zoster Recombinant 11/27/2020  7:38 AM 0.5 mL 2/12/2018 Intramuscular 9/19/2022    Site: Left deltoid     Given By: Raeann Klein LPN     : [employer]     Lot: [id number]            Follow up in about 6 months (around 5/27/2021).    Disclaimer:  This note may have been prepared using voice recognition software, it may have not been extensively proofed, as such there could be errors within the text such as sound alike errors.

## 2020-12-11 ENCOUNTER — PATIENT MESSAGE (OUTPATIENT)
Dept: OTHER | Facility: OTHER | Age: 70
End: 2020-12-11

## 2021-01-16 ENCOUNTER — IMMUNIZATION (OUTPATIENT)
Dept: INTERNAL MEDICINE | Facility: CLINIC | Age: 71
End: 2021-01-16
Payer: MEDICARE

## 2021-01-16 DIAGNOSIS — Z23 NEED FOR VACCINATION: Primary | ICD-10-CM

## 2021-01-16 PROCEDURE — 91300 COVID-19, MRNA, LNP-S, PF, 30 MCG/0.3 ML DOSE VACCINE: CPT | Mod: HCNC,PBBFAC | Performed by: FAMILY MEDICINE

## 2021-02-06 ENCOUNTER — IMMUNIZATION (OUTPATIENT)
Dept: INTERNAL MEDICINE | Facility: CLINIC | Age: 71
End: 2021-02-06
Payer: MEDICARE

## 2021-02-06 DIAGNOSIS — Z23 NEED FOR VACCINATION: Primary | ICD-10-CM

## 2021-02-06 PROCEDURE — 91300 COVID-19, MRNA, LNP-S, PF, 30 MCG/0.3 ML DOSE VACCINE: CPT | Mod: HCNC,PBBFAC | Performed by: FAMILY MEDICINE

## 2021-02-06 PROCEDURE — 0002A COVID-19, MRNA, LNP-S, PF, 30 MCG/0.3 ML DOSE VACCINE: CPT | Mod: HCNC,PBBFAC | Performed by: FAMILY MEDICINE

## 2021-02-23 ENCOUNTER — TELEPHONE (OUTPATIENT)
Dept: INTERNAL MEDICINE | Facility: CLINIC | Age: 71
End: 2021-02-23

## 2021-02-25 ENCOUNTER — PES CALL (OUTPATIENT)
Dept: ADMINISTRATIVE | Facility: CLINIC | Age: 71
End: 2021-02-25

## 2021-02-25 RX ORDER — ESOMEPRAZOLE MAGNESIUM 40 MG/1
40 CAPSULE, DELAYED RELEASE ORAL DAILY
Qty: 90 CAPSULE | Refills: 1 | Status: SHIPPED | OUTPATIENT
Start: 2021-02-25 | End: 2021-06-30

## 2021-03-28 ENCOUNTER — TELEPHONE (OUTPATIENT)
Dept: ADMINISTRATIVE | Facility: CLINIC | Age: 71
End: 2021-03-28

## 2021-05-06 ENCOUNTER — TELEPHONE (OUTPATIENT)
Dept: ADMINISTRATIVE | Facility: CLINIC | Age: 71
End: 2021-05-06

## 2021-05-07 ENCOUNTER — OFFICE VISIT (OUTPATIENT)
Dept: INTERNAL MEDICINE | Facility: CLINIC | Age: 71
End: 2021-05-07
Payer: MEDICARE

## 2021-05-07 DIAGNOSIS — Z83.511 FAMILY HISTORY OF GLAUCOMA: Chronic | ICD-10-CM

## 2021-05-07 DIAGNOSIS — M72.2 PLANTAR FASCIITIS, RIGHT: ICD-10-CM

## 2021-05-07 DIAGNOSIS — I10 ESSENTIAL HYPERTENSION: Chronic | ICD-10-CM

## 2021-05-07 DIAGNOSIS — H25.13 AGE-RELATED NUCLEAR CATARACT OF BOTH EYES: ICD-10-CM

## 2021-05-07 DIAGNOSIS — Z00.00 ENCOUNTER FOR PREVENTIVE HEALTH EXAMINATION: Primary | ICD-10-CM

## 2021-05-07 DIAGNOSIS — E66.01 MORBID OBESITY: ICD-10-CM

## 2021-05-07 DIAGNOSIS — L82.1 SEBORRHEIC KERATOSIS: ICD-10-CM

## 2021-05-07 DIAGNOSIS — M19.049 PRIMARY OSTEOARTHRITIS OF HAND, UNSPECIFIED LATERALITY: ICD-10-CM

## 2021-05-07 DIAGNOSIS — Z28.9 DELAYED IMMUNIZATIONS: ICD-10-CM

## 2021-05-07 DIAGNOSIS — M79.644 THUMB PAIN, RIGHT: ICD-10-CM

## 2021-05-07 DIAGNOSIS — M17.10 ARTHRITIS OF KNEE: ICD-10-CM

## 2021-05-07 DIAGNOSIS — E78.2 MIXED HYPERLIPIDEMIA: ICD-10-CM

## 2021-05-07 PROCEDURE — 1158F PR ADVANCE CARE PLANNING DISCUSS DOCUMENTED IN MEDICAL RECORD: ICD-10-PCS | Mod: HCNC,S$GLB,, | Performed by: NURSE PRACTITIONER

## 2021-05-07 PROCEDURE — 1158F ADVNC CARE PLAN TLK DOCD: CPT | Mod: HCNC,S$GLB,, | Performed by: NURSE PRACTITIONER

## 2021-05-07 PROCEDURE — 1101F PT FALLS ASSESS-DOCD LE1/YR: CPT | Mod: HCNC,CPTII,S$GLB, | Performed by: NURSE PRACTITIONER

## 2021-05-07 PROCEDURE — 99499 UNLISTED E&M SERVICE: CPT | Mod: HCNC,S$GLB,, | Performed by: NURSE PRACTITIONER

## 2021-05-07 PROCEDURE — 99499 RISK ADDL DX/OHS AUDIT: ICD-10-PCS | Mod: HCNC,S$GLB,, | Performed by: NURSE PRACTITIONER

## 2021-05-07 PROCEDURE — 3288F FALL RISK ASSESSMENT DOCD: CPT | Mod: HCNC,CPTII,S$GLB, | Performed by: NURSE PRACTITIONER

## 2021-05-07 PROCEDURE — G0439 PR MEDICARE ANNUAL WELLNESS SUBSEQUENT VISIT: ICD-10-PCS | Mod: HCNC,S$GLB,, | Performed by: NURSE PRACTITIONER

## 2021-05-07 PROCEDURE — 3288F PR FALLS RISK ASSESSMENT DOCUMENTED: ICD-10-PCS | Mod: HCNC,CPTII,S$GLB, | Performed by: NURSE PRACTITIONER

## 2021-05-07 PROCEDURE — 1101F PR PT FALLS ASSESS DOC 0-1 FALLS W/OUT INJ PAST YR: ICD-10-PCS | Mod: HCNC,CPTII,S$GLB, | Performed by: NURSE PRACTITIONER

## 2021-05-07 PROCEDURE — G0439 PPPS, SUBSEQ VISIT: HCPCS | Mod: HCNC,S$GLB,, | Performed by: NURSE PRACTITIONER

## 2021-05-27 ENCOUNTER — LAB VISIT (OUTPATIENT)
Dept: LAB | Facility: HOSPITAL | Age: 71
End: 2021-05-27
Attending: FAMILY MEDICINE
Payer: MEDICARE

## 2021-05-27 ENCOUNTER — OFFICE VISIT (OUTPATIENT)
Dept: INTERNAL MEDICINE | Facility: CLINIC | Age: 71
End: 2021-05-27
Payer: MEDICARE

## 2021-05-27 VITALS
HEART RATE: 62 BPM | HEIGHT: 70 IN | BODY MASS INDEX: 43.93 KG/M2 | TEMPERATURE: 98 F | DIASTOLIC BLOOD PRESSURE: 80 MMHG | SYSTOLIC BLOOD PRESSURE: 124 MMHG | OXYGEN SATURATION: 97 % | WEIGHT: 306.88 LBS | RESPIRATION RATE: 18 BRPM

## 2021-05-27 DIAGNOSIS — J30.9 CHRONIC ALLERGIC RHINITIS: ICD-10-CM

## 2021-05-27 DIAGNOSIS — B35.1 ONYCHOMYCOSIS: ICD-10-CM

## 2021-05-27 DIAGNOSIS — Z12.5 PROSTATE CANCER SCREENING: ICD-10-CM

## 2021-05-27 DIAGNOSIS — I10 ESSENTIAL HYPERTENSION: Primary | ICD-10-CM

## 2021-05-27 DIAGNOSIS — E66.01 MORBID OBESITY: ICD-10-CM

## 2021-05-27 DIAGNOSIS — Z12.11 COLON CANCER SCREENING: ICD-10-CM

## 2021-05-27 DIAGNOSIS — I10 ESSENTIAL HYPERTENSION: ICD-10-CM

## 2021-05-27 LAB
ALBUMIN SERPL BCP-MCNC: 3.7 G/DL (ref 3.5–5.2)
ALP SERPL-CCNC: 78 U/L (ref 55–135)
ALT SERPL W/O P-5'-P-CCNC: 15 U/L (ref 10–44)
ANION GAP SERPL CALC-SCNC: 9 MMOL/L (ref 8–16)
AST SERPL-CCNC: 16 U/L (ref 10–40)
BILIRUB SERPL-MCNC: 0.5 MG/DL (ref 0.1–1)
BUN SERPL-MCNC: 11 MG/DL (ref 8–23)
CALCIUM SERPL-MCNC: 8.9 MG/DL (ref 8.7–10.5)
CHLORIDE SERPL-SCNC: 102 MMOL/L (ref 95–110)
CO2 SERPL-SCNC: 29 MMOL/L (ref 23–29)
CREAT SERPL-MCNC: 1 MG/DL (ref 0.5–1.4)
EST. GFR  (AFRICAN AMERICAN): >60 ML/MIN/1.73 M^2
EST. GFR  (NON AFRICAN AMERICAN): >60 ML/MIN/1.73 M^2
GLUCOSE SERPL-MCNC: 103 MG/DL (ref 70–110)
POTASSIUM SERPL-SCNC: 4.3 MMOL/L (ref 3.5–5.1)
PROT SERPL-MCNC: 7.2 G/DL (ref 6–8.4)
SODIUM SERPL-SCNC: 140 MMOL/L (ref 136–145)

## 2021-05-27 PROCEDURE — 36415 COLL VENOUS BLD VENIPUNCTURE: CPT | Mod: HCNC,PO | Performed by: FAMILY MEDICINE

## 2021-05-27 PROCEDURE — 3008F BODY MASS INDEX DOCD: CPT | Mod: HCNC,CPTII,S$GLB, | Performed by: FAMILY MEDICINE

## 2021-05-27 PROCEDURE — 1126F AMNT PAIN NOTED NONE PRSNT: CPT | Mod: HCNC,S$GLB,, | Performed by: FAMILY MEDICINE

## 2021-05-27 PROCEDURE — 99999 PR PBB SHADOW E&M-EST. PATIENT-LVL IV: CPT | Mod: PBBFAC,HCNC,, | Performed by: FAMILY MEDICINE

## 2021-05-27 PROCEDURE — 3008F PR BODY MASS INDEX (BMI) DOCUMENTED: ICD-10-PCS | Mod: HCNC,CPTII,S$GLB, | Performed by: FAMILY MEDICINE

## 2021-05-27 PROCEDURE — 84153 ASSAY OF PSA TOTAL: CPT | Mod: HCNC | Performed by: FAMILY MEDICINE

## 2021-05-27 PROCEDURE — 1159F MED LIST DOCD IN RCRD: CPT | Mod: HCNC,S$GLB,, | Performed by: FAMILY MEDICINE

## 2021-05-27 PROCEDURE — 80053 COMPREHEN METABOLIC PANEL: CPT | Mod: HCNC,PO | Performed by: FAMILY MEDICINE

## 2021-05-27 PROCEDURE — 99214 PR OFFICE/OUTPT VISIT, EST, LEVL IV, 30-39 MIN: ICD-10-PCS | Mod: HCNC,S$GLB,, | Performed by: FAMILY MEDICINE

## 2021-05-27 PROCEDURE — 1159F PR MEDICATION LIST DOCUMENTED IN MEDICAL RECORD: ICD-10-PCS | Mod: HCNC,S$GLB,, | Performed by: FAMILY MEDICINE

## 2021-05-27 PROCEDURE — 3079F DIAST BP 80-89 MM HG: CPT | Mod: HCNC,CPTII,S$GLB, | Performed by: FAMILY MEDICINE

## 2021-05-27 PROCEDURE — 99999 PR PBB SHADOW E&M-EST. PATIENT-LVL IV: ICD-10-PCS | Mod: PBBFAC,HCNC,, | Performed by: FAMILY MEDICINE

## 2021-05-27 PROCEDURE — 3288F PR FALLS RISK ASSESSMENT DOCUMENTED: ICD-10-PCS | Mod: HCNC,CPTII,S$GLB, | Performed by: FAMILY MEDICINE

## 2021-05-27 PROCEDURE — 1101F PT FALLS ASSESS-DOCD LE1/YR: CPT | Mod: HCNC,CPTII,S$GLB, | Performed by: FAMILY MEDICINE

## 2021-05-27 PROCEDURE — 3074F PR MOST RECENT SYSTOLIC BLOOD PRESSURE < 130 MM HG: ICD-10-PCS | Mod: HCNC,CPTII,S$GLB, | Performed by: FAMILY MEDICINE

## 2021-05-27 PROCEDURE — 3288F FALL RISK ASSESSMENT DOCD: CPT | Mod: HCNC,CPTII,S$GLB, | Performed by: FAMILY MEDICINE

## 2021-05-27 PROCEDURE — 3074F SYST BP LT 130 MM HG: CPT | Mod: HCNC,CPTII,S$GLB, | Performed by: FAMILY MEDICINE

## 2021-05-27 PROCEDURE — 1126F PR PAIN SEVERITY QUANTIFIED, NO PAIN PRESENT: ICD-10-PCS | Mod: HCNC,S$GLB,, | Performed by: FAMILY MEDICINE

## 2021-05-27 PROCEDURE — 80061 LIPID PANEL: CPT | Mod: HCNC | Performed by: FAMILY MEDICINE

## 2021-05-27 PROCEDURE — 3079F PR MOST RECENT DIASTOLIC BLOOD PRESSURE 80-89 MM HG: ICD-10-PCS | Mod: HCNC,CPTII,S$GLB, | Performed by: FAMILY MEDICINE

## 2021-05-27 PROCEDURE — 99214 OFFICE O/P EST MOD 30 MIN: CPT | Mod: HCNC,S$GLB,, | Performed by: FAMILY MEDICINE

## 2021-05-27 PROCEDURE — 1101F PR PT FALLS ASSESS DOC 0-1 FALLS W/OUT INJ PAST YR: ICD-10-PCS | Mod: HCNC,CPTII,S$GLB, | Performed by: FAMILY MEDICINE

## 2021-05-27 RX ORDER — TERBINAFINE HYDROCHLORIDE 250 MG/1
250 TABLET ORAL DAILY
Qty: 90 TABLET | Refills: 0 | Status: SHIPPED | OUTPATIENT
Start: 2021-05-27 | End: 2021-08-25

## 2021-05-28 LAB
CHOLEST SERPL-MCNC: 146 MG/DL (ref 120–199)
CHOLEST/HDLC SERPL: 3.5 {RATIO} (ref 2–5)
COMPLEXED PSA SERPL-MCNC: 3.9 NG/ML (ref 0–4)
HDLC SERPL-MCNC: 42 MG/DL (ref 40–75)
HDLC SERPL: 28.8 % (ref 20–50)
LDLC SERPL CALC-MCNC: 83.2 MG/DL (ref 63–159)
NONHDLC SERPL-MCNC: 104 MG/DL
TRIGL SERPL-MCNC: 104 MG/DL (ref 30–150)

## 2021-06-23 ENCOUNTER — LAB VISIT (OUTPATIENT)
Dept: LAB | Facility: HOSPITAL | Age: 71
End: 2021-06-23
Attending: FAMILY MEDICINE
Payer: MEDICARE

## 2021-06-23 DIAGNOSIS — Z12.11 COLON CANCER SCREENING: ICD-10-CM

## 2021-06-23 PROCEDURE — 82274 ASSAY TEST FOR BLOOD FECAL: CPT | Mod: HCNC | Performed by: FAMILY MEDICINE

## 2021-07-01 ENCOUNTER — LAB VISIT (OUTPATIENT)
Dept: LAB | Facility: HOSPITAL | Age: 71
End: 2021-07-01
Attending: FAMILY MEDICINE
Payer: MEDICARE

## 2021-07-01 DIAGNOSIS — B35.1 ONYCHOMYCOSIS: ICD-10-CM

## 2021-07-01 LAB
ALBUMIN SERPL BCP-MCNC: 3.7 G/DL (ref 3.5–5.2)
ALP SERPL-CCNC: 81 U/L (ref 55–135)
ALT SERPL W/O P-5'-P-CCNC: 19 U/L (ref 10–44)
ANION GAP SERPL CALC-SCNC: 8 MMOL/L (ref 8–16)
AST SERPL-CCNC: 14 U/L (ref 10–40)
BILIRUB SERPL-MCNC: 0.5 MG/DL (ref 0.1–1)
BUN SERPL-MCNC: 11 MG/DL (ref 8–23)
CALCIUM SERPL-MCNC: 9.4 MG/DL (ref 8.7–10.5)
CHLORIDE SERPL-SCNC: 101 MMOL/L (ref 95–110)
CO2 SERPL-SCNC: 29 MMOL/L (ref 23–29)
CREAT SERPL-MCNC: 1 MG/DL (ref 0.5–1.4)
EST. GFR  (AFRICAN AMERICAN): >60 ML/MIN/1.73 M^2
EST. GFR  (NON AFRICAN AMERICAN): >60 ML/MIN/1.73 M^2
GLUCOSE SERPL-MCNC: 100 MG/DL (ref 70–110)
HEMOCCULT STL QL IA: NEGATIVE
POTASSIUM SERPL-SCNC: 4.6 MMOL/L (ref 3.5–5.1)
PROT SERPL-MCNC: 7.4 G/DL (ref 6–8.4)
SODIUM SERPL-SCNC: 138 MMOL/L (ref 136–145)

## 2021-07-01 PROCEDURE — 80053 COMPREHEN METABOLIC PANEL: CPT | Mod: HCNC,PO | Performed by: FAMILY MEDICINE

## 2021-07-01 PROCEDURE — 36415 COLL VENOUS BLD VENIPUNCTURE: CPT | Mod: HCNC,PO | Performed by: FAMILY MEDICINE

## 2021-09-23 ENCOUNTER — PATIENT OUTREACH (OUTPATIENT)
Dept: ADMINISTRATIVE | Facility: HOSPITAL | Age: 71
End: 2021-09-23

## 2021-10-12 ENCOUNTER — IMMUNIZATION (OUTPATIENT)
Dept: INTERNAL MEDICINE | Facility: CLINIC | Age: 71
End: 2021-10-12
Payer: MEDICARE

## 2021-10-12 PROCEDURE — G0008 FLU VACCINE - QUADRIVALENT - ADJUVANTED: ICD-10-PCS | Mod: HCNC,S$GLB,, | Performed by: FAMILY MEDICINE

## 2021-10-12 PROCEDURE — 90694 VACC AIIV4 NO PRSRV 0.5ML IM: CPT | Mod: HCNC,S$GLB,, | Performed by: FAMILY MEDICINE

## 2021-10-12 PROCEDURE — 90694 FLU VACCINE - QUADRIVALENT - ADJUVANTED: ICD-10-PCS | Mod: HCNC,S$GLB,, | Performed by: FAMILY MEDICINE

## 2021-10-12 PROCEDURE — G0008 ADMIN INFLUENZA VIRUS VAC: HCPCS | Mod: HCNC,S$GLB,, | Performed by: FAMILY MEDICINE

## 2021-10-14 ENCOUNTER — IMMUNIZATION (OUTPATIENT)
Dept: PRIMARY CARE CLINIC | Facility: CLINIC | Age: 71
End: 2021-10-14
Payer: MEDICARE

## 2021-10-14 DIAGNOSIS — Z23 NEED FOR VACCINATION: Primary | ICD-10-CM

## 2021-10-14 PROCEDURE — 0003A COVID-19, MRNA, LNP-S, PF, 30 MCG/0.3 ML DOSE VACCINE: CPT | Mod: CV19,PBBFAC | Performed by: FAMILY MEDICINE

## 2021-10-14 PROCEDURE — 91300 COVID-19, MRNA, LNP-S, PF, 30 MCG/0.3 ML DOSE VACCINE: CPT | Mod: PBBFAC | Performed by: FAMILY MEDICINE

## 2021-11-26 ENCOUNTER — OFFICE VISIT (OUTPATIENT)
Dept: INTERNAL MEDICINE | Facility: CLINIC | Age: 71
End: 2021-11-26
Payer: MEDICARE

## 2021-11-26 VITALS
TEMPERATURE: 98 F | DIASTOLIC BLOOD PRESSURE: 76 MMHG | HEIGHT: 70 IN | BODY MASS INDEX: 43.37 KG/M2 | OXYGEN SATURATION: 98 % | HEART RATE: 67 BPM | SYSTOLIC BLOOD PRESSURE: 114 MMHG | WEIGHT: 302.94 LBS

## 2021-11-26 DIAGNOSIS — I10 ESSENTIAL HYPERTENSION: Primary | Chronic | ICD-10-CM

## 2021-11-26 DIAGNOSIS — E78.2 MIXED HYPERLIPIDEMIA: ICD-10-CM

## 2021-11-26 DIAGNOSIS — E78.00 ELEVATED CHOLESTEROL: Chronic | ICD-10-CM

## 2021-11-26 DIAGNOSIS — E66.01 MORBID OBESITY: ICD-10-CM

## 2021-11-26 PROCEDURE — 4010F PR ACE/ARB THEARPY RXD/TAKEN: ICD-10-PCS | Mod: HCNC,CPTII,S$GLB, | Performed by: NURSE PRACTITIONER

## 2021-11-26 PROCEDURE — 99213 OFFICE O/P EST LOW 20 MIN: CPT | Mod: HCNC,S$GLB,, | Performed by: NURSE PRACTITIONER

## 2021-11-26 PROCEDURE — 99999 PR PBB SHADOW E&M-EST. PATIENT-LVL III: ICD-10-PCS | Mod: PBBFAC,HCNC,, | Performed by: NURSE PRACTITIONER

## 2021-11-26 PROCEDURE — 99499 RISK ADDL DX/OHS AUDIT: ICD-10-PCS | Mod: HCNC,S$GLB,, | Performed by: NURSE PRACTITIONER

## 2021-11-26 PROCEDURE — 99999 PR PBB SHADOW E&M-EST. PATIENT-LVL III: CPT | Mod: PBBFAC,HCNC,, | Performed by: NURSE PRACTITIONER

## 2021-11-26 PROCEDURE — 99499 UNLISTED E&M SERVICE: CPT | Mod: HCNC,S$GLB,, | Performed by: NURSE PRACTITIONER

## 2021-11-26 PROCEDURE — 99213 PR OFFICE/OUTPT VISIT, EST, LEVL III, 20-29 MIN: ICD-10-PCS | Mod: HCNC,S$GLB,, | Performed by: NURSE PRACTITIONER

## 2021-11-26 PROCEDURE — 4010F ACE/ARB THERAPY RXD/TAKEN: CPT | Mod: HCNC,CPTII,S$GLB, | Performed by: NURSE PRACTITIONER

## 2021-11-26 RX ORDER — LISINOPRIL AND HYDROCHLOROTHIAZIDE 12.5; 2 MG/1; MG/1
1 TABLET ORAL DAILY
Qty: 90 TABLET | Refills: 3 | Status: SHIPPED | OUTPATIENT
Start: 2021-11-26 | End: 2021-12-07 | Stop reason: SDUPTHER

## 2021-11-26 RX ORDER — SIMVASTATIN 40 MG/1
40 TABLET, FILM COATED ORAL NIGHTLY
Qty: 90 TABLET | Refills: 3 | Status: SHIPPED | OUTPATIENT
Start: 2021-11-26 | End: 2021-12-07 | Stop reason: SDUPTHER

## 2021-11-26 RX ORDER — AMLODIPINE BESYLATE 10 MG/1
10 TABLET ORAL DAILY
Qty: 90 TABLET | Refills: 3 | Status: SHIPPED | OUTPATIENT
Start: 2021-11-26 | End: 2021-12-07 | Stop reason: SDUPTHER

## 2021-12-07 DIAGNOSIS — E78.00 ELEVATED CHOLESTEROL: Chronic | ICD-10-CM

## 2021-12-07 DIAGNOSIS — I10 ESSENTIAL HYPERTENSION: Chronic | ICD-10-CM

## 2021-12-07 RX ORDER — LISINOPRIL AND HYDROCHLOROTHIAZIDE 12.5; 2 MG/1; MG/1
1 TABLET ORAL DAILY
Qty: 90 TABLET | Refills: 3 | Status: SHIPPED | OUTPATIENT
Start: 2021-12-07 | End: 2023-01-03 | Stop reason: SDUPTHER

## 2021-12-07 RX ORDER — SIMVASTATIN 40 MG/1
40 TABLET, FILM COATED ORAL NIGHTLY
Qty: 90 TABLET | Refills: 3 | Status: SHIPPED | OUTPATIENT
Start: 2021-12-07 | End: 2023-01-03 | Stop reason: SDUPTHER

## 2021-12-07 RX ORDER — AMLODIPINE BESYLATE 10 MG/1
10 TABLET ORAL DAILY
Qty: 90 TABLET | Refills: 3 | Status: SHIPPED | OUTPATIENT
Start: 2021-12-07 | End: 2023-01-03 | Stop reason: SDUPTHER

## 2021-12-10 RX ORDER — ESOMEPRAZOLE MAGNESIUM 40 MG/1
40 CAPSULE, DELAYED RELEASE ORAL DAILY
Qty: 90 CAPSULE | Refills: 1 | Status: SHIPPED | OUTPATIENT
Start: 2021-12-10 | End: 2023-01-03

## 2022-02-01 ENCOUNTER — OFFICE VISIT (OUTPATIENT)
Dept: DERMATOLOGY | Facility: CLINIC | Age: 72
End: 2022-02-01
Payer: MEDICARE

## 2022-02-01 DIAGNOSIS — D23.9 HYDROCYSTOMA: ICD-10-CM

## 2022-02-01 DIAGNOSIS — L72.0 MILIA: Primary | ICD-10-CM

## 2022-02-01 PROCEDURE — 99999 PR PBB SHADOW E&M-EST. PATIENT-LVL II: CPT | Mod: PBBFAC,HCNC,, | Performed by: DERMATOLOGY

## 2022-02-01 PROCEDURE — 1160F PR REVIEW ALL MEDS BY PRESCRIBER/CLIN PHARMACIST DOCUMENTED: ICD-10-PCS | Mod: HCNC,CPTII,S$GLB, | Performed by: DERMATOLOGY

## 2022-02-01 PROCEDURE — 1126F AMNT PAIN NOTED NONE PRSNT: CPT | Mod: HCNC,CPTII,S$GLB, | Performed by: DERMATOLOGY

## 2022-02-01 PROCEDURE — 1160F RVW MEDS BY RX/DR IN RCRD: CPT | Mod: HCNC,CPTII,S$GLB, | Performed by: DERMATOLOGY

## 2022-02-01 PROCEDURE — 3288F FALL RISK ASSESSMENT DOCD: CPT | Mod: HCNC,CPTII,S$GLB, | Performed by: DERMATOLOGY

## 2022-02-01 PROCEDURE — 10040 PR ACNE SURGERY OF SKIN ABSCESS: ICD-10-PCS | Mod: HCNC,S$GLB,, | Performed by: DERMATOLOGY

## 2022-02-01 PROCEDURE — 99999 PR PBB SHADOW E&M-EST. PATIENT-LVL II: ICD-10-PCS | Mod: PBBFAC,HCNC,, | Performed by: DERMATOLOGY

## 2022-02-01 PROCEDURE — 99202 OFFICE O/P NEW SF 15 MIN: CPT | Mod: 25,HCNC,S$GLB, | Performed by: DERMATOLOGY

## 2022-02-01 PROCEDURE — 1159F MED LIST DOCD IN RCRD: CPT | Mod: HCNC,CPTII,S$GLB, | Performed by: DERMATOLOGY

## 2022-02-01 PROCEDURE — 99202 PR OFFICE/OUTPT VISIT, NEW, LEVL II, 15-29 MIN: ICD-10-PCS | Mod: 25,HCNC,S$GLB, | Performed by: DERMATOLOGY

## 2022-02-01 PROCEDURE — 1101F PT FALLS ASSESS-DOCD LE1/YR: CPT | Mod: HCNC,CPTII,S$GLB, | Performed by: DERMATOLOGY

## 2022-02-01 PROCEDURE — 1101F PR PT FALLS ASSESS DOC 0-1 FALLS W/OUT INJ PAST YR: ICD-10-PCS | Mod: HCNC,CPTII,S$GLB, | Performed by: DERMATOLOGY

## 2022-02-01 PROCEDURE — 1126F PR PAIN SEVERITY QUANTIFIED, NO PAIN PRESENT: ICD-10-PCS | Mod: HCNC,CPTII,S$GLB, | Performed by: DERMATOLOGY

## 2022-02-01 PROCEDURE — 1159F PR MEDICATION LIST DOCUMENTED IN MEDICAL RECORD: ICD-10-PCS | Mod: HCNC,CPTII,S$GLB, | Performed by: DERMATOLOGY

## 2022-02-01 PROCEDURE — 3288F PR FALLS RISK ASSESSMENT DOCUMENTED: ICD-10-PCS | Mod: HCNC,CPTII,S$GLB, | Performed by: DERMATOLOGY

## 2022-02-01 PROCEDURE — 10040 EXTRACTION: CPT | Mod: HCNC,S$GLB,, | Performed by: DERMATOLOGY

## 2022-02-01 NOTE — PROGRESS NOTES
Subjective:       Patient ID:  Juan Shepherd is a 71 y.o. male who presents for   Chief Complaint   Patient presents with    bumps on eyelid     History of Present Illness: The patient presents with chief complaint of lesions.  Location: bilateral eyelids  Duration: several months  Signs/Symptoms: growing, irritation    Prior treatments: none        Review of Systems   Constitutional: Negative for fever and chills.   Gastrointestinal: Negative for nausea and vomiting.   Skin: Positive for activity-related sunscreen use. Negative for daily sunscreen use and recent sunburn.   Hematologic/Lymphatic: Does not bruise/bleed easily.        Objective:    Physical Exam   Constitutional: He appears well-developed and well-nourished. No distress.   Eyes:       Neurological: He is alert and oriented to person, place, and time. He is not disoriented.   Psychiatric: He has a normal mood and affect.   Skin:   Areas Examined (abnormalities noted in diagram):   Head / Face Inspection Performed  Neck Inspection Performed  RUE Inspected  LUE Inspection Performed  Nails and Digits Inspection Performed         Diagram Legend     Erythematous scaling macule/papule c/w actinic keratosis       Vascular papule c/w angioma      Pigmented verrucoid papule/plaque c/w seborrheic keratosis      Yellow umbilicated papule c/w sebaceous hyperplasia      Irregularly shaped tan macule c/w lentigo     1-2 mm smooth white papules consistent with Milia      Movable subcutaneous cyst with punctum c/w epidermal inclusion cyst      Subcutaneous movable cyst c/w pilar cyst      Firm pink to brown papule c/w dermatofibroma      Pedunculated fleshy papule(s) c/w skin tag(s)      Evenly pigmented macule c/w junctional nevus     Mildly variegated pigmented, slightly irregular-bordered macule c/w mildly atypical nevus      Flesh colored to evenly pigmented papule c/w intradermal nevus       Pink pearly papule/plaque c/w basal cell carcinoma      Erythematous  hyperkeratotic cursted plaque c/w SCC      Surgical scar with no sign of skin cancer recurrence      Open and closed comedones      Inflammatory papules and pustules      Verrucoid papule consistent consistent with wart     Erythematous eczematous patches and plaques     Dystrophic onycholytic nail with subungual debris c/w onychomycosis     Umbilicated papule    Erythematous-base heme-crusted tan verrucoid plaque consistent with inflamed seborrheic keratosis     Erythematous Silvery Scaling Plaque c/w Psoriasis     See annotation      Assessment / Plan:        Milia  Incision and expression of comedo contents performed today on the right lower eyelid with #11 blade and comedo extractor x 2 lesions. No complications.     Hydrocystoma  Of the left upper eyelid. Lesion incised and drained today.  No complications.            Follow up if symptoms worsen or fail to improve.

## 2022-02-03 ENCOUNTER — OFFICE VISIT (OUTPATIENT)
Dept: INTERNAL MEDICINE | Facility: CLINIC | Age: 72
End: 2022-02-03
Payer: MEDICARE

## 2022-02-03 VITALS
SYSTOLIC BLOOD PRESSURE: 124 MMHG | DIASTOLIC BLOOD PRESSURE: 72 MMHG | WEIGHT: 306 LBS | OXYGEN SATURATION: 99 % | HEIGHT: 70 IN | TEMPERATURE: 99 F | HEART RATE: 77 BPM | BODY MASS INDEX: 43.81 KG/M2

## 2022-02-03 DIAGNOSIS — R10.11 RIGHT UPPER QUADRANT PAIN: Primary | ICD-10-CM

## 2022-02-03 DIAGNOSIS — E66.01 MORBID OBESITY: ICD-10-CM

## 2022-02-03 DIAGNOSIS — I10 ESSENTIAL HYPERTENSION: ICD-10-CM

## 2022-02-03 PROCEDURE — 3288F PR FALLS RISK ASSESSMENT DOCUMENTED: ICD-10-PCS | Mod: HCNC,CPTII,S$GLB, | Performed by: NURSE PRACTITIONER

## 2022-02-03 PROCEDURE — 3008F BODY MASS INDEX DOCD: CPT | Mod: HCNC,CPTII,S$GLB, | Performed by: NURSE PRACTITIONER

## 2022-02-03 PROCEDURE — 3078F DIAST BP <80 MM HG: CPT | Mod: HCNC,CPTII,S$GLB, | Performed by: NURSE PRACTITIONER

## 2022-02-03 PROCEDURE — 1101F PT FALLS ASSESS-DOCD LE1/YR: CPT | Mod: HCNC,CPTII,S$GLB, | Performed by: NURSE PRACTITIONER

## 2022-02-03 PROCEDURE — 99999 PR PBB SHADOW E&M-EST. PATIENT-LVL IV: CPT | Mod: PBBFAC,HCNC,, | Performed by: NURSE PRACTITIONER

## 2022-02-03 PROCEDURE — 99214 OFFICE O/P EST MOD 30 MIN: CPT | Mod: HCNC,S$GLB,, | Performed by: NURSE PRACTITIONER

## 2022-02-03 PROCEDURE — 3008F PR BODY MASS INDEX (BMI) DOCUMENTED: ICD-10-PCS | Mod: HCNC,CPTII,S$GLB, | Performed by: NURSE PRACTITIONER

## 2022-02-03 PROCEDURE — 3078F PR MOST RECENT DIASTOLIC BLOOD PRESSURE < 80 MM HG: ICD-10-PCS | Mod: HCNC,CPTII,S$GLB, | Performed by: NURSE PRACTITIONER

## 2022-02-03 PROCEDURE — 3074F SYST BP LT 130 MM HG: CPT | Mod: HCNC,CPTII,S$GLB, | Performed by: NURSE PRACTITIONER

## 2022-02-03 PROCEDURE — 1101F PR PT FALLS ASSESS DOC 0-1 FALLS W/OUT INJ PAST YR: ICD-10-PCS | Mod: HCNC,CPTII,S$GLB, | Performed by: NURSE PRACTITIONER

## 2022-02-03 PROCEDURE — 99214 PR OFFICE/OUTPT VISIT, EST, LEVL IV, 30-39 MIN: ICD-10-PCS | Mod: HCNC,S$GLB,, | Performed by: NURSE PRACTITIONER

## 2022-02-03 PROCEDURE — 1125F AMNT PAIN NOTED PAIN PRSNT: CPT | Mod: HCNC,CPTII,S$GLB, | Performed by: NURSE PRACTITIONER

## 2022-02-03 PROCEDURE — 99499 RISK ADDL DX/OHS AUDIT: ICD-10-PCS | Mod: S$GLB,,, | Performed by: NURSE PRACTITIONER

## 2022-02-03 PROCEDURE — 3288F FALL RISK ASSESSMENT DOCD: CPT | Mod: HCNC,CPTII,S$GLB, | Performed by: NURSE PRACTITIONER

## 2022-02-03 PROCEDURE — 1160F RVW MEDS BY RX/DR IN RCRD: CPT | Mod: HCNC,CPTII,S$GLB, | Performed by: NURSE PRACTITIONER

## 2022-02-03 PROCEDURE — 1159F MED LIST DOCD IN RCRD: CPT | Mod: HCNC,CPTII,S$GLB, | Performed by: NURSE PRACTITIONER

## 2022-02-03 PROCEDURE — 99999 PR PBB SHADOW E&M-EST. PATIENT-LVL IV: ICD-10-PCS | Mod: PBBFAC,HCNC,, | Performed by: NURSE PRACTITIONER

## 2022-02-03 PROCEDURE — 1160F PR REVIEW ALL MEDS BY PRESCRIBER/CLIN PHARMACIST DOCUMENTED: ICD-10-PCS | Mod: HCNC,CPTII,S$GLB, | Performed by: NURSE PRACTITIONER

## 2022-02-03 PROCEDURE — 3074F PR MOST RECENT SYSTOLIC BLOOD PRESSURE < 130 MM HG: ICD-10-PCS | Mod: HCNC,CPTII,S$GLB, | Performed by: NURSE PRACTITIONER

## 2022-02-03 PROCEDURE — 99499 UNLISTED E&M SERVICE: CPT | Mod: S$GLB,,, | Performed by: NURSE PRACTITIONER

## 2022-02-03 PROCEDURE — 1159F PR MEDICATION LIST DOCUMENTED IN MEDICAL RECORD: ICD-10-PCS | Mod: HCNC,CPTII,S$GLB, | Performed by: NURSE PRACTITIONER

## 2022-02-03 PROCEDURE — 1125F PR PAIN SEVERITY QUANTIFIED, PAIN PRESENT: ICD-10-PCS | Mod: HCNC,CPTII,S$GLB, | Performed by: NURSE PRACTITIONER

## 2022-02-03 RX ORDER — INFLUENZA VACCINE, ADJUVANTED 15; 15; 15; 15 UG/.5ML; UG/.5ML; UG/.5ML; UG/.5ML
INJECTION, SUSPENSION INTRAMUSCULAR
COMMUNITY
Start: 2021-10-12 | End: 2023-02-02 | Stop reason: ALTCHOICE

## 2022-02-03 NOTE — PROGRESS NOTES
Subjective:       Patient ID: Juan Shepherd is a 71 y.o. male.    Chief Complaint: Flank Pain (Right side x 1 month)    Mr Shepherd presents to clinic with complaint of RUQ and r flank pain that has been going on since December.  He reports that he thought that he tore a muscle while lifting up on his wife's scooter, but pain has not resolved.  He denies any nausea, vomiting, or diarrhea.  History of cholecystectomy.  He expresses concern for liver disease due to his grandfather passing away from liver cancer.    Flank Pain  This is a new problem. The current episode started more than 1 month ago. The problem occurs constantly. The problem has been waxing and waning since onset. Pain location: RUQ/R flank. The quality of the pain is described as shooting and aching. The pain is at a severity of 2/10. The pain is moderate. The symptoms are aggravated by twisting. Associated symptoms include abdominal pain. Pertinent negatives include no bladder incontinence, bowel incontinence, chest pain, dysuria, fever, headaches, numbness, paresis or tingling. Risk factors include obesity and sedentary lifestyle. Treatments tried: rest. The treatment provided no relief.       Patient Active Problem List   Diagnosis    Mixed hyperlipidemia    Essential hypertension    Family history of glaucoma    Age-related nuclear cataract of both eyes    Morbid obesity    Thumb pain, right    Delayed immunizations    Arthritis of knee    Seborrheic keratosis    Osteoarthritis of hand    Plantar fasciitis, right    Onychomycosis    Chronic allergic rhinitis    Right upper quadrant pain       Family History   Problem Relation Age of Onset    Glaucoma Mother     Pulmonary embolism Mother     Dementia Father     Anxiety disorder Father     Post-traumatic stress disorder Father     Restless legs syndrome Sister     Edema Sister     Polymyalgia rheumatica Brother     Clotting disorder Brother     Skin cancer Daughter     No Known  "Problems Son     No Known Problems Brother     No Known Problems Sister      Past Surgical History:   Procedure Laterality Date    CHOLECYSTECTOMY      COLONOSCOPY      FINGER SURGERY      HAND SURGERY      HERNIA REPAIR      VASECTOMY           Current Outpatient Medications:     amLODIPine (NORVASC) 10 MG tablet, Take 1 tablet (10 mg total) by mouth once daily., Disp: 90 tablet, Rfl: 3    aspirin (ECOTRIN) 81 MG EC tablet, Take 81 mg by mouth once daily., Disp: , Rfl:     esomeprazole (NEXIUM) 40 MG capsule, Take 1 capsule (40 mg total) by mouth once daily., Disp: 90 capsule, Rfl: 1    lisinopriL-hydrochlorothiazide (PRINZIDE,ZESTORETIC) 20-12.5 mg per tablet, Take 1 tablet by mouth once daily., Disp: 90 tablet, Rfl: 3    simvastatin (ZOCOR) 40 MG tablet, Take 1 tablet (40 mg total) by mouth every evening., Disp: 90 tablet, Rfl: 3    FLUAD QUAD 2021-22,65Y UP,,PF, 60 mcg (15 mcg x 4)/0.5 mL Syrg, , Disp: , Rfl:     Review of Systems   Constitutional: Negative for appetite change, chills, fatigue and fever.   Respiratory: Negative for cough and shortness of breath.    Cardiovascular: Negative for chest pain and palpitations.   Gastrointestinal: Positive for abdominal pain. Negative for blood in stool, bowel incontinence, constipation, diarrhea, nausea and vomiting.   Genitourinary: Positive for flank pain. Negative for bladder incontinence, dysuria, frequency, hematuria and urgency.   Musculoskeletal: Negative for back pain and myalgias.   Neurological: Negative for dizziness, tingling, light-headedness, numbness and headaches.       Objective:   /72 (BP Location: Left arm, Patient Position: Sitting, BP Method: Large (Manual))   Pulse 77   Temp 98.8 °F (37.1 °C) (Temporal)   Ht 5' 10" (1.778 m)   Wt (!) 138.8 kg (306 lb)   SpO2 99%   BMI 43.91 kg/m²      Physical Exam  Constitutional:       General: He is not in acute distress.     Appearance: Normal appearance. He is obese. He is not " "ill-appearing.   Cardiovascular:      Rate and Rhythm: Normal rate and regular rhythm.      Pulses: Normal pulses.      Heart sounds: Normal heart sounds. No murmur heard.  No friction rub. No gallop.    Pulmonary:      Effort: Pulmonary effort is normal. No respiratory distress.      Breath sounds: Normal breath sounds. No wheezing.   Abdominal:      Palpations: Abdomen is soft.      Tenderness: There is abdominal tenderness in the right upper quadrant. There is no right CVA tenderness, left CVA tenderness or guarding. Negative signs include Larios's sign and McBurney's sign.   Musculoskeletal:      Cervical back: Normal range of motion.   Skin:     General: Skin is warm and dry.      Coloration: Skin is not pale.      Findings: No erythema.   Neurological:      Mental Status: He is alert and oriented to person, place, and time.         Assessment & Plan     Problem List Items Addressed This Visit        Cardiac/Vascular    Essential hypertension    Current Assessment & Plan     Blood pressure well controlled. Continue current medications.             Endocrine    Morbid obesity    Current Assessment & Plan     Counseled on importance of diet and exercise in order to improve overall quality of life, and reduce risk of future comorbidities.              GI    Right upper quadrant pain - Primary    Current Assessment & Plan     RUQ ttp, mild. But patient is concerned of liver disease since grandfather diet of liver CA. Will proceed with US.          Relevant Orders    US Abdomen Limited (Completed)           Follow up if symptoms worsen or fail to improve.          Portions of this note may have been created with voice recognition software. Occasional "wrong-word" or "sound-a-like" substitutions may have occurred due to the inherent limitations of voice recognition software. Please, read the note carefully and recognize, using context, where substitutions have occurred.       "

## 2022-02-04 ENCOUNTER — HOSPITAL ENCOUNTER (OUTPATIENT)
Dept: RADIOLOGY | Facility: HOSPITAL | Age: 72
Discharge: HOME | End: 2022-02-04
Attending: NURSE PRACTITIONER
Payer: MEDICARE

## 2022-02-04 DIAGNOSIS — R10.11 RIGHT UPPER QUADRANT PAIN: ICD-10-CM

## 2022-02-04 PROCEDURE — 76705 ECHO EXAM OF ABDOMEN: CPT | Mod: 26,HCNC,, | Performed by: RADIOLOGY

## 2022-02-04 PROCEDURE — 76705 ECHO EXAM OF ABDOMEN: CPT | Mod: TC,HCNC,PO

## 2022-02-04 PROCEDURE — 76705 US ABDOMEN LIMITED: ICD-10-PCS | Mod: 26,HCNC,, | Performed by: RADIOLOGY

## 2022-02-09 PROBLEM — R10.11 RIGHT UPPER QUADRANT PAIN: Status: ACTIVE | Noted: 2022-02-09

## 2022-02-09 NOTE — ASSESSMENT & PLAN NOTE
Counseled on importance of diet and exercise in order to improve overall quality of life, and reduce risk of future comorbidities.

## 2022-02-09 NOTE — ASSESSMENT & PLAN NOTE
RUQ ttp, mild. But patient is concerned of liver disease since grandfather diet of liver CA. Will proceed with US.

## 2022-07-20 DIAGNOSIS — I10 ESSENTIAL HYPERTENSION: ICD-10-CM

## 2023-01-03 DIAGNOSIS — E78.00 ELEVATED CHOLESTEROL: Chronic | ICD-10-CM

## 2023-01-03 DIAGNOSIS — I10 ESSENTIAL HYPERTENSION: Chronic | ICD-10-CM

## 2023-01-03 RX ORDER — LISINOPRIL AND HYDROCHLOROTHIAZIDE 12.5; 2 MG/1; MG/1
1 TABLET ORAL DAILY
Qty: 90 TABLET | Refills: 3 | Status: SHIPPED | OUTPATIENT
Start: 2023-01-03 | End: 2023-02-02 | Stop reason: SDUPTHER

## 2023-01-03 RX ORDER — AMLODIPINE BESYLATE 10 MG/1
10 TABLET ORAL DAILY
Qty: 90 TABLET | Refills: 3 | Status: SHIPPED | OUTPATIENT
Start: 2023-01-03 | End: 2023-02-02 | Stop reason: SDUPTHER

## 2023-01-03 RX ORDER — SIMVASTATIN 40 MG/1
40 TABLET, FILM COATED ORAL NIGHTLY
Qty: 90 TABLET | Refills: 3 | Status: SHIPPED | OUTPATIENT
Start: 2023-01-03 | End: 2023-02-02 | Stop reason: SDUPTHER

## 2023-01-30 ENCOUNTER — PATIENT OUTREACH (OUTPATIENT)
Dept: ADMINISTRATIVE | Facility: HOSPITAL | Age: 73
End: 2023-01-30
Payer: MEDICARE

## 2023-01-30 NOTE — PROGRESS NOTES
HTN REPORT: Called and spoke with patient. He does not take at home readings daily. Only when he feels needed. Been over 2-3 weeks since he took one. He did just see Urgent Care for a cold and stated his blood pressure was really good. Somewhere 118/70 or something like that. He is due for annual with PCP. Got that scheduled for 2/2/23.

## 2023-02-02 ENCOUNTER — LAB VISIT (OUTPATIENT)
Dept: LAB | Facility: HOSPITAL | Age: 73
End: 2023-02-02
Attending: NURSE PRACTITIONER
Payer: MEDICARE

## 2023-02-02 ENCOUNTER — OFFICE VISIT (OUTPATIENT)
Dept: INTERNAL MEDICINE | Facility: CLINIC | Age: 73
End: 2023-02-02
Payer: MEDICARE

## 2023-02-02 VITALS
BODY MASS INDEX: 42.61 KG/M2 | HEIGHT: 70 IN | OXYGEN SATURATION: 98 % | WEIGHT: 297.63 LBS | SYSTOLIC BLOOD PRESSURE: 112 MMHG | DIASTOLIC BLOOD PRESSURE: 72 MMHG | HEART RATE: 67 BPM | TEMPERATURE: 97 F

## 2023-02-02 DIAGNOSIS — E78.2 MIXED HYPERLIPIDEMIA: ICD-10-CM

## 2023-02-02 DIAGNOSIS — Z12.5 PROSTATE CANCER SCREENING: ICD-10-CM

## 2023-02-02 DIAGNOSIS — E66.01 MORBID OBESITY: ICD-10-CM

## 2023-02-02 DIAGNOSIS — I10 ESSENTIAL HYPERTENSION: Chronic | ICD-10-CM

## 2023-02-02 DIAGNOSIS — I10 ESSENTIAL HYPERTENSION: Primary | Chronic | ICD-10-CM

## 2023-02-02 DIAGNOSIS — E78.00 ELEVATED CHOLESTEROL: Chronic | ICD-10-CM

## 2023-02-02 DIAGNOSIS — K21.9 GASTROESOPHAGEAL REFLUX DISEASE, UNSPECIFIED WHETHER ESOPHAGITIS PRESENT: ICD-10-CM

## 2023-02-02 PROBLEM — R10.11 RIGHT UPPER QUADRANT PAIN: Status: RESOLVED | Noted: 2022-02-09 | Resolved: 2023-02-02

## 2023-02-02 LAB
ALBUMIN SERPL BCP-MCNC: 3.9 G/DL (ref 3.5–5.2)
ALP SERPL-CCNC: 71 U/L (ref 55–135)
ALT SERPL W/O P-5'-P-CCNC: 14 U/L (ref 10–44)
ANION GAP SERPL CALC-SCNC: 11 MMOL/L (ref 8–16)
AST SERPL-CCNC: 14 U/L (ref 10–40)
BILIRUB SERPL-MCNC: 0.4 MG/DL (ref 0.1–1)
BUN SERPL-MCNC: 14 MG/DL (ref 8–23)
CALCIUM SERPL-MCNC: 9.9 MG/DL (ref 8.7–10.5)
CHLORIDE SERPL-SCNC: 104 MMOL/L (ref 95–110)
CHOLEST SERPL-MCNC: 143 MG/DL (ref 120–199)
CHOLEST/HDLC SERPL: 3.1 {RATIO} (ref 2–5)
CO2 SERPL-SCNC: 26 MMOL/L (ref 23–29)
CREAT SERPL-MCNC: 1 MG/DL (ref 0.5–1.4)
EST. GFR  (NO RACE VARIABLE): >60 ML/MIN/1.73 M^2
GLUCOSE SERPL-MCNC: 105 MG/DL (ref 70–110)
HDLC SERPL-MCNC: 46 MG/DL (ref 40–75)
HDLC SERPL: 32.2 % (ref 20–50)
LDLC SERPL CALC-MCNC: 82.4 MG/DL (ref 63–159)
NONHDLC SERPL-MCNC: 97 MG/DL
POTASSIUM SERPL-SCNC: 5 MMOL/L (ref 3.5–5.1)
PROT SERPL-MCNC: 7.3 G/DL (ref 6–8.4)
SODIUM SERPL-SCNC: 141 MMOL/L (ref 136–145)
TRIGL SERPL-MCNC: 73 MG/DL (ref 30–150)

## 2023-02-02 PROCEDURE — 99397 PER PM REEVAL EST PAT 65+ YR: CPT | Mod: HCNC,S$GLB,, | Performed by: NURSE PRACTITIONER

## 2023-02-02 PROCEDURE — 1101F PT FALLS ASSESS-DOCD LE1/YR: CPT | Mod: HCNC,CPTII,S$GLB, | Performed by: NURSE PRACTITIONER

## 2023-02-02 PROCEDURE — 99397 PR PREVENTIVE VISIT,EST,65 & OVER: ICD-10-PCS | Mod: HCNC,S$GLB,, | Performed by: NURSE PRACTITIONER

## 2023-02-02 PROCEDURE — 1160F PR REVIEW ALL MEDS BY PRESCRIBER/CLIN PHARMACIST DOCUMENTED: ICD-10-PCS | Mod: HCNC,CPTII,S$GLB, | Performed by: NURSE PRACTITIONER

## 2023-02-02 PROCEDURE — 3074F PR MOST RECENT SYSTOLIC BLOOD PRESSURE < 130 MM HG: ICD-10-PCS | Mod: HCNC,CPTII,S$GLB, | Performed by: NURSE PRACTITIONER

## 2023-02-02 PROCEDURE — 99999 PR PBB SHADOW E&M-EST. PATIENT-LVL III: CPT | Mod: PBBFAC,HCNC,, | Performed by: NURSE PRACTITIONER

## 2023-02-02 PROCEDURE — 1126F PR PAIN SEVERITY QUANTIFIED, NO PAIN PRESENT: ICD-10-PCS | Mod: HCNC,CPTII,S$GLB, | Performed by: NURSE PRACTITIONER

## 2023-02-02 PROCEDURE — 84153 ASSAY OF PSA TOTAL: CPT | Mod: HCNC | Performed by: NURSE PRACTITIONER

## 2023-02-02 PROCEDURE — 1160F RVW MEDS BY RX/DR IN RCRD: CPT | Mod: HCNC,CPTII,S$GLB, | Performed by: NURSE PRACTITIONER

## 2023-02-02 PROCEDURE — 4010F ACE/ARB THERAPY RXD/TAKEN: CPT | Mod: HCNC,CPTII,S$GLB, | Performed by: NURSE PRACTITIONER

## 2023-02-02 PROCEDURE — 80053 COMPREHEN METABOLIC PANEL: CPT | Mod: HCNC,PO | Performed by: NURSE PRACTITIONER

## 2023-02-02 PROCEDURE — 3288F PR FALLS RISK ASSESSMENT DOCUMENTED: ICD-10-PCS | Mod: HCNC,CPTII,S$GLB, | Performed by: NURSE PRACTITIONER

## 2023-02-02 PROCEDURE — 1159F MED LIST DOCD IN RCRD: CPT | Mod: HCNC,CPTII,S$GLB, | Performed by: NURSE PRACTITIONER

## 2023-02-02 PROCEDURE — 1159F PR MEDICATION LIST DOCUMENTED IN MEDICAL RECORD: ICD-10-PCS | Mod: HCNC,CPTII,S$GLB, | Performed by: NURSE PRACTITIONER

## 2023-02-02 PROCEDURE — 3074F SYST BP LT 130 MM HG: CPT | Mod: HCNC,CPTII,S$GLB, | Performed by: NURSE PRACTITIONER

## 2023-02-02 PROCEDURE — 3008F PR BODY MASS INDEX (BMI) DOCUMENTED: ICD-10-PCS | Mod: HCNC,CPTII,S$GLB, | Performed by: NURSE PRACTITIONER

## 2023-02-02 PROCEDURE — 1126F AMNT PAIN NOTED NONE PRSNT: CPT | Mod: HCNC,CPTII,S$GLB, | Performed by: NURSE PRACTITIONER

## 2023-02-02 PROCEDURE — 1101F PR PT FALLS ASSESS DOC 0-1 FALLS W/OUT INJ PAST YR: ICD-10-PCS | Mod: HCNC,CPTII,S$GLB, | Performed by: NURSE PRACTITIONER

## 2023-02-02 PROCEDURE — 3008F BODY MASS INDEX DOCD: CPT | Mod: HCNC,CPTII,S$GLB, | Performed by: NURSE PRACTITIONER

## 2023-02-02 PROCEDURE — 36415 COLL VENOUS BLD VENIPUNCTURE: CPT | Mod: HCNC,PO | Performed by: NURSE PRACTITIONER

## 2023-02-02 PROCEDURE — 80061 LIPID PANEL: CPT | Mod: HCNC | Performed by: NURSE PRACTITIONER

## 2023-02-02 PROCEDURE — 3078F DIAST BP <80 MM HG: CPT | Mod: HCNC,CPTII,S$GLB, | Performed by: NURSE PRACTITIONER

## 2023-02-02 PROCEDURE — 3288F FALL RISK ASSESSMENT DOCD: CPT | Mod: HCNC,CPTII,S$GLB, | Performed by: NURSE PRACTITIONER

## 2023-02-02 PROCEDURE — 99999 PR PBB SHADOW E&M-EST. PATIENT-LVL III: ICD-10-PCS | Mod: PBBFAC,HCNC,, | Performed by: NURSE PRACTITIONER

## 2023-02-02 PROCEDURE — 3078F PR MOST RECENT DIASTOLIC BLOOD PRESSURE < 80 MM HG: ICD-10-PCS | Mod: HCNC,CPTII,S$GLB, | Performed by: NURSE PRACTITIONER

## 2023-02-02 PROCEDURE — 4010F PR ACE/ARB THEARPY RXD/TAKEN: ICD-10-PCS | Mod: HCNC,CPTII,S$GLB, | Performed by: NURSE PRACTITIONER

## 2023-02-02 RX ORDER — ESOMEPRAZOLE MAGNESIUM 40 MG/1
40 CAPSULE, DELAYED RELEASE ORAL DAILY
Qty: 90 CAPSULE | Refills: 3 | Status: SHIPPED | OUTPATIENT
Start: 2023-02-02 | End: 2024-02-07 | Stop reason: SDUPTHER

## 2023-02-02 RX ORDER — INFLUENZA VACCINE, ADJUVANTED 15; 15; 15; 15 UG/.5ML; UG/.5ML; UG/.5ML; UG/.5ML
INJECTION, SUSPENSION INTRAMUSCULAR
Status: CANCELLED | OUTPATIENT
Start: 2023-02-02

## 2023-02-02 RX ORDER — LISINOPRIL AND HYDROCHLOROTHIAZIDE 12.5; 2 MG/1; MG/1
1 TABLET ORAL DAILY
Qty: 90 TABLET | Refills: 3 | Status: SHIPPED | OUTPATIENT
Start: 2023-02-02 | End: 2024-02-07 | Stop reason: SDUPTHER

## 2023-02-02 RX ORDER — SIMVASTATIN 40 MG/1
40 TABLET, FILM COATED ORAL NIGHTLY
Qty: 90 TABLET | Refills: 3 | Status: SHIPPED | OUTPATIENT
Start: 2023-02-02

## 2023-02-02 RX ORDER — AMLODIPINE BESYLATE 10 MG/1
10 TABLET ORAL DAILY
Qty: 90 TABLET | Refills: 3 | Status: SHIPPED | OUTPATIENT
Start: 2023-02-02

## 2023-02-02 NOTE — ASSESSMENT & PLAN NOTE
AMA form signed by pt. Rx x2 given with discharge instructions. Understanding verbalized.    Counseled on importance of diet and exercise in order to improve overall quality of life, and reduce risk of future comorbidities.

## 2023-02-02 NOTE — PROGRESS NOTES
Subjective:       Patient ID: Juan Shepherd is a 72 y.o. male.    Chief Complaint: Annual Exam    Mr. Shepherd presents to visit for annual wellness exam. Due for annual labs.  He has no acute complaints today.  Overall has been doing well.  Remains active with hunting season.  Has been going to chiropractor for some intermittent back issues.  Seems to be improving.    Hypertension  This is a chronic problem. The current episode started more than 1 year ago. The problem is controlled. Pertinent negatives include no anxiety, blurred vision, chest pain, headaches, malaise/fatigue, palpitations, peripheral edema or shortness of breath. There are no associated agents to hypertension. Risk factors for coronary artery disease include obesity and male gender. Past treatments include calcium channel blockers, ACE inhibitors and diuretics. The current treatment provides significant improvement. There are no compliance problems.  There is no history of angina, kidney disease, CAD/MI, CVA, heart failure, left ventricular hypertrophy, PVD or retinopathy.     Patient Active Problem List   Diagnosis    Mixed hyperlipidemia    Essential hypertension    Family history of glaucoma    Age-related nuclear cataract of both eyes    Morbid obesity    Thumb pain, right    Delayed immunizations    Arthritis of knee    Seborrheic keratosis    Osteoarthritis of hand    Plantar fasciitis, right    Onychomycosis    Chronic allergic rhinitis         Past Surgical History:   Procedure Laterality Date    CHOLECYSTECTOMY      COLONOSCOPY      FINGER SURGERY      HAND SURGERY      HERNIA REPAIR      VASECTOMY           Current Outpatient Medications:     aspirin (ECOTRIN) 81 MG EC tablet, Take 81 mg by mouth once daily., Disp: , Rfl:     amLODIPine (NORVASC) 10 MG tablet, Take 1 tablet (10 mg total) by mouth once daily., Disp: 90 tablet, Rfl: 3    esomeprazole (NEXIUM) 40 MG capsule, Take 1 capsule (40 mg total) by mouth once daily., Disp: 90 capsule,  "Rfl: 3    lisinopriL-hydrochlorothiazide (PRINZIDE,ZESTORETIC) 20-12.5 mg per tablet, Take 1 tablet by mouth once daily., Disp: 90 tablet, Rfl: 3    simvastatin (ZOCOR) 40 MG tablet, Take 1 tablet (40 mg total) by mouth every evening., Disp: 90 tablet, Rfl: 3    Review of Systems   Constitutional:  Negative for chills, fatigue, fever and malaise/fatigue.   Eyes:  Negative for blurred vision and visual disturbance.   Respiratory:  Negative for cough, shortness of breath and wheezing.    Cardiovascular:  Negative for chest pain, palpitations and leg swelling.   Gastrointestinal:  Negative for abdominal pain, constipation, diarrhea, nausea and vomiting.   Musculoskeletal:  Positive for back pain (improving with chiropractor). Negative for arthralgias and myalgias.   Neurological:  Negative for dizziness, light-headedness and headaches.   Psychiatric/Behavioral:  Negative for dysphoric mood and sleep disturbance. The patient is not nervous/anxious.      Objective:   /72 (BP Location: Left arm, Patient Position: Sitting, BP Method: X-Large (Manual))   Pulse 67   Temp 97.3 °F (36.3 °C) (Temporal)   Ht 5' 10" (1.778 m)   Wt 135 kg (297 lb 9.9 oz)   SpO2 98%   BMI 42.70 kg/m²      Physical Exam  Vitals reviewed.   Constitutional:       General: He is not in acute distress.     Appearance: Normal appearance. He is well-developed. He is obese. He is not ill-appearing or diaphoretic.   HENT:      Head: Normocephalic.   Cardiovascular:      Rate and Rhythm: Normal rate and regular rhythm.      Pulses: Normal pulses.      Heart sounds: Normal heart sounds. No murmur heard.    No friction rub. No gallop.   Pulmonary:      Effort: Pulmonary effort is normal. No respiratory distress.      Breath sounds: Normal breath sounds. No rales.   Abdominal:      Palpations: Abdomen is soft.      Tenderness: There is no abdominal tenderness. There is no guarding.   Musculoskeletal:         General: Normal range of motion.      " "Cervical back: Normal range of motion and neck supple.   Skin:     General: Skin is warm and dry.      Coloration: Skin is not pale.      Findings: No erythema.   Neurological:      Mental Status: He is alert and oriented to person, place, and time.       Assessment & Plan     Problem List Items Addressed This Visit          Cardiac/Vascular    Mixed hyperlipidemia    Current Assessment & Plan     On statin.  Lipid panel ordered.         Relevant Medications    simvastatin (ZOCOR) 40 MG tablet    Other Relevant Orders    Lipid Panel    Essential hypertension - Primary    Current Assessment & Plan     Blood pressure well controlled. Continue current medications.          Relevant Medications    amLODIPine (NORVASC) 10 MG tablet    lisinopriL-hydrochlorothiazide (PRINZIDE,ZESTORETIC) 20-12.5 mg per tablet    Other Relevant Orders    COMPREHENSIVE METABOLIC PANEL (Completed)       Endocrine    Morbid obesity    Current Assessment & Plan     Counseled on importance of diet and exercise in order to improve overall quality of life, and reduce risk of future comorbidities.            Other Visit Diagnoses       Elevated cholesterol  (Chronic)       Relevant Medications    simvastatin (ZOCOR) 40 MG tablet    Prostate cancer screening        Relevant Orders    PSA, Screening    Gastroesophageal reflux disease, unspecified whether esophagitis present        Relevant Medications    esomeprazole (NEXIUM) 40 MG capsule            Follow up in about 6 months (around 8/2/2023) for hypertension.          Portions of this note may have been created with voice recognition software. Occasional "wrong-word" or "sound-a-like" substitutions may have occurred due to the inherent limitations of voice recognition software. Please, read the note carefully and recognize, using context, where substitutions have occurred.       "

## 2023-02-03 DIAGNOSIS — R97.20 ELEVATED PSA: Primary | ICD-10-CM

## 2023-02-03 LAB — COMPLEXED PSA SERPL-MCNC: 4.6 NG/ML (ref 0–4)

## 2023-02-07 DIAGNOSIS — Z00.00 ENCOUNTER FOR MEDICARE ANNUAL WELLNESS EXAM: ICD-10-CM

## 2023-02-08 ENCOUNTER — TELEPHONE (OUTPATIENT)
Dept: ADMINISTRATIVE | Facility: HOSPITAL | Age: 73
End: 2023-02-08
Payer: MEDICARE

## 2023-02-09 DIAGNOSIS — Z00.00 ENCOUNTER FOR MEDICARE ANNUAL WELLNESS EXAM: ICD-10-CM

## 2023-03-01 ENCOUNTER — TELEPHONE (OUTPATIENT)
Dept: ADMINISTRATIVE | Facility: HOSPITAL | Age: 73
End: 2023-03-01
Payer: MEDICARE

## 2023-03-07 ENCOUNTER — OFFICE VISIT (OUTPATIENT)
Dept: UROLOGY | Facility: CLINIC | Age: 73
End: 2023-03-07
Payer: MEDICARE

## 2023-03-07 VITALS
SYSTOLIC BLOOD PRESSURE: 127 MMHG | BODY MASS INDEX: 42.26 KG/M2 | WEIGHT: 294.56 LBS | HEART RATE: 66 BPM | DIASTOLIC BLOOD PRESSURE: 79 MMHG

## 2023-03-07 DIAGNOSIS — R97.20 ELEVATED PSA: ICD-10-CM

## 2023-03-07 PROCEDURE — 3078F PR MOST RECENT DIASTOLIC BLOOD PRESSURE < 80 MM HG: ICD-10-PCS | Mod: HCNC,CPTII,S$GLB, | Performed by: UROLOGY

## 2023-03-07 PROCEDURE — 1160F PR REVIEW ALL MEDS BY PRESCRIBER/CLIN PHARMACIST DOCUMENTED: ICD-10-PCS | Mod: HCNC,CPTII,S$GLB, | Performed by: UROLOGY

## 2023-03-07 PROCEDURE — 4010F PR ACE/ARB THEARPY RXD/TAKEN: ICD-10-PCS | Mod: HCNC,CPTII,S$GLB, | Performed by: UROLOGY

## 2023-03-07 PROCEDURE — 1126F AMNT PAIN NOTED NONE PRSNT: CPT | Mod: HCNC,CPTII,S$GLB, | Performed by: UROLOGY

## 2023-03-07 PROCEDURE — 3074F PR MOST RECENT SYSTOLIC BLOOD PRESSURE < 130 MM HG: ICD-10-PCS | Mod: HCNC,CPTII,S$GLB, | Performed by: UROLOGY

## 2023-03-07 PROCEDURE — 4010F ACE/ARB THERAPY RXD/TAKEN: CPT | Mod: HCNC,CPTII,S$GLB, | Performed by: UROLOGY

## 2023-03-07 PROCEDURE — 1101F PR PT FALLS ASSESS DOC 0-1 FALLS W/OUT INJ PAST YR: ICD-10-PCS | Mod: HCNC,CPTII,S$GLB, | Performed by: UROLOGY

## 2023-03-07 PROCEDURE — 99999 PR PBB SHADOW E&M-EST. PATIENT-LVL III: ICD-10-PCS | Mod: PBBFAC,HCNC,, | Performed by: UROLOGY

## 2023-03-07 PROCEDURE — 99999 PR PBB SHADOW E&M-EST. PATIENT-LVL III: CPT | Mod: PBBFAC,HCNC,, | Performed by: UROLOGY

## 2023-03-07 PROCEDURE — 3074F SYST BP LT 130 MM HG: CPT | Mod: HCNC,CPTII,S$GLB, | Performed by: UROLOGY

## 2023-03-07 PROCEDURE — 3288F FALL RISK ASSESSMENT DOCD: CPT | Mod: HCNC,CPTII,S$GLB, | Performed by: UROLOGY

## 2023-03-07 PROCEDURE — 3288F PR FALLS RISK ASSESSMENT DOCUMENTED: ICD-10-PCS | Mod: HCNC,CPTII,S$GLB, | Performed by: UROLOGY

## 2023-03-07 PROCEDURE — 1160F RVW MEDS BY RX/DR IN RCRD: CPT | Mod: HCNC,CPTII,S$GLB, | Performed by: UROLOGY

## 2023-03-07 PROCEDURE — 99203 OFFICE O/P NEW LOW 30 MIN: CPT | Mod: HCNC,S$GLB,, | Performed by: UROLOGY

## 2023-03-07 PROCEDURE — 3008F PR BODY MASS INDEX (BMI) DOCUMENTED: ICD-10-PCS | Mod: HCNC,CPTII,S$GLB, | Performed by: UROLOGY

## 2023-03-07 PROCEDURE — 1159F PR MEDICATION LIST DOCUMENTED IN MEDICAL RECORD: ICD-10-PCS | Mod: HCNC,CPTII,S$GLB, | Performed by: UROLOGY

## 2023-03-07 PROCEDURE — 3008F BODY MASS INDEX DOCD: CPT | Mod: HCNC,CPTII,S$GLB, | Performed by: UROLOGY

## 2023-03-07 PROCEDURE — 99203 PR OFFICE/OUTPT VISIT, NEW, LEVL III, 30-44 MIN: ICD-10-PCS | Mod: HCNC,S$GLB,, | Performed by: UROLOGY

## 2023-03-07 PROCEDURE — 1101F PT FALLS ASSESS-DOCD LE1/YR: CPT | Mod: HCNC,CPTII,S$GLB, | Performed by: UROLOGY

## 2023-03-07 PROCEDURE — 1159F MED LIST DOCD IN RCRD: CPT | Mod: HCNC,CPTII,S$GLB, | Performed by: UROLOGY

## 2023-03-07 PROCEDURE — 1126F PR PAIN SEVERITY QUANTIFIED, NO PAIN PRESENT: ICD-10-PCS | Mod: HCNC,CPTII,S$GLB, | Performed by: UROLOGY

## 2023-03-07 PROCEDURE — 3078F DIAST BP <80 MM HG: CPT | Mod: HCNC,CPTII,S$GLB, | Performed by: UROLOGY

## 2023-03-08 NOTE — PROGRESS NOTES
Chief Complaint:  Elevated PSA    HPI:   Juan Shepherd is a 72 y.o. male that presents today as a referral from TIFFANIE Bernard for elevated PSA.  Patient had routine lab work obtained which showed an elevated PSA of 4.6.  Trend over the last several years shows PSA of 3.9 in 2021, 2.7 in 2020, 2.2 in 2019, 1.9 in 2018.  No prior elevated values.  No family history of prostate cancer.  Notes a decent stream but does have some hesitancy if he waits too long.  Denies any gross hematuria or UTIs.  Not currently sexually active as his wife has Parkinson's.    PMH:  Past Medical History:   Diagnosis Date    Arthritis     Cataract     Cough 1/30/2018    Duodenitis     EGD 8/19/2016 (see outside procedure 10/27/2016 under [facility])    Elevated cholesterol     Ex-smoker     Gastritis     EGD 8/19/2016 (see outside procedure 10/27/2016 under [facility])    Hiatal hernia     EGD 8/19/2016 (see outside procedure 10/27/2016 under [facility])    Hypertension     Morbid obesity     Pre-operative clearance 7/30/2018    Reflux esophagitis     EGD 8/19/2016 (see outside procedure 10/27/2016 under [facility])    Tinnitus     and hL eval by ent diana       PSH:  Past Surgical History:   Procedure Laterality Date    CHOLECYSTECTOMY      COLONOSCOPY      FINGER SURGERY      HAND SURGERY      HERNIA REPAIR      VASECTOMY         Family History:  Family History   Problem Relation Age of Onset    Glaucoma Mother     Pulmonary embolism Mother     Dementia Father     Anxiety disorder Father     Post-traumatic stress disorder Father     Restless legs syndrome Sister     Edema Sister     Polymyalgia rheumatica Brother     Clotting disorder Brother     Skin cancer Daughter     No Known Problems Son     No Known Problems Brother     No Known Problems Sister        Social History:  Social History     Tobacco Use    Smoking status: Former     Packs/day: 0.50     Years: 6.00     Pack years: 3.00     Types: Cigarettes     Quit date: 1977     Years since quitting:  46.2    Smokeless tobacco: Never    Tobacco comments:     light smoker quit over 36 y/   Substance Use Topics    Alcohol use: Yes     Comment: occasionally    Drug use: No        Review of Systems:  General: No fever, chills  Skin: No rashes  Chest:  Denies cough and sputum production  Heart: Denies chest pain  Resp: Denies dyspnea  Abdomen: Denies diarrhea, abdominal pain, hematemesis, or blood in stool.  Musculoskeletal: No joint stiffness or swelling. Denies back pain.  : see HPI  Neuro: no dizziness or weakness    Allergies:  Sulfamethoxazole-trimethoprim    Medications:    Current Outpatient Medications:     amLODIPine (NORVASC) 10 MG tablet, Take 1 tablet (10 mg total) by mouth once daily., Disp: 90 tablet, Rfl: 3    aspirin (ECOTRIN) 81 MG EC tablet, Take 81 mg by mouth once daily., Disp: , Rfl:     esomeprazole (NEXIUM) 40 MG capsule, Take 1 capsule (40 mg total) by mouth once daily., Disp: 90 capsule, Rfl: 3    lisinopriL-hydrochlorothiazide (PRINZIDE,ZESTORETIC) 20-12.5 mg per tablet, Take 1 tablet by mouth once daily., Disp: 90 tablet, Rfl: 3    simvastatin (ZOCOR) 40 MG tablet, Take 1 tablet (40 mg total) by mouth every evening., Disp: 90 tablet, Rfl: 3    Physical Exam:  Vitals:    03/07/23 0810   BP: 127/79   Pulse: 66     Body mass index is 42.26 kg/m².  General: awake, alert, cooperative  Head: NC/AT  Ears: external ears normal  Eyes: sclera normal  Lungs: normal inspiration, NAD  Heart: well-perfused  Abdomen: Soft, NT, ND  : Normal circ'd phallus, meatus normal in size and position, BL testicles palpable, no masses, nontender, no abnormalities of epididymi  SOCRATES: Normal rectal tone, no hemorrhoids. Prostate smooth and normal, no nodules 40 gm SV not palpable. Perineum and anus normal.  Lymphatic: groin nodes negative  Skin: The skin is warm and dry  Ext: No c/c/e.  Neuro: grossly intact, AOx3    RADIOLOGY:  No recent relevant imaging available for review.    LABS:  I personally reviewed the  following lab values:  Lab Results   Component Value Date    WBC 5.87 08/21/2020    HGB 15.0 08/21/2020    HCT 46.0 08/21/2020     08/21/2020     02/02/2023    K 5.0 02/02/2023     02/02/2023    CREATININE 1.0 02/02/2023    BUN 14 02/02/2023    CO2 26 02/02/2023    TSH 2.546 01/24/2017    PSA 4.6 (H) 02/02/2023    CHOL 143 02/02/2023    TRIG 73 02/02/2023    HDL 46 02/02/2023    ALT 14 02/02/2023    AST 14 02/02/2023     Assessment/Plan:   Juan Shepherd is a 72 y.o. male with:    Elevated PSA - repeat PSA in 2-3 weeks, if it remains elevated, we will proceed with MRI    Thank you for allowing me the opportunity to participate in this patient's care.     Fidencio Cotto MD  Urology

## 2023-03-21 ENCOUNTER — LAB VISIT (OUTPATIENT)
Dept: LAB | Facility: HOSPITAL | Age: 73
End: 2023-03-21
Attending: UROLOGY
Payer: MEDICARE

## 2023-03-21 DIAGNOSIS — R97.20 ELEVATED PSA: ICD-10-CM

## 2023-03-21 PROCEDURE — 84153 ASSAY OF PSA TOTAL: CPT | Mod: HCNC | Performed by: UROLOGY

## 2023-03-21 PROCEDURE — 36415 COLL VENOUS BLD VENIPUNCTURE: CPT | Mod: HCNC,PO | Performed by: UROLOGY

## 2023-03-22 ENCOUNTER — PATIENT MESSAGE (OUTPATIENT)
Dept: UROLOGY | Facility: CLINIC | Age: 73
End: 2023-03-22
Payer: MEDICARE

## 2023-03-22 DIAGNOSIS — R97.20 ELEVATED PSA: Primary | ICD-10-CM

## 2023-03-22 LAB — COMPLEXED PSA SERPL-MCNC: 4.1 NG/ML (ref 0–4)

## 2023-03-31 ENCOUNTER — TELEPHONE (OUTPATIENT)
Dept: UROLOGY | Facility: CLINIC | Age: 73
End: 2023-03-31
Payer: MEDICARE

## 2023-03-31 ENCOUNTER — PATIENT MESSAGE (OUTPATIENT)
Dept: UROLOGY | Facility: CLINIC | Age: 73
End: 2023-03-31
Payer: MEDICARE

## 2023-03-31 NOTE — TELEPHONE ENCOUNTER
Attempted to call pt to inform him that Dr. Cotto wanted him to have a prostate MRI and rtc; no answer.  Appt scheduled and mailed.  Will send a [employer] message as well.

## 2023-04-03 ENCOUNTER — OFFICE VISIT (OUTPATIENT)
Dept: INTERNAL MEDICINE | Facility: CLINIC | Age: 73
End: 2023-04-03
Payer: MEDICARE

## 2023-04-03 VITALS
WEIGHT: 293.88 LBS | BODY MASS INDEX: 42.07 KG/M2 | OXYGEN SATURATION: 98 % | DIASTOLIC BLOOD PRESSURE: 70 MMHG | HEIGHT: 70 IN | HEART RATE: 73 BPM | TEMPERATURE: 99 F | SYSTOLIC BLOOD PRESSURE: 128 MMHG

## 2023-04-03 DIAGNOSIS — Z00.00 ENCOUNTER FOR MEDICARE ANNUAL WELLNESS EXAM: Primary | ICD-10-CM

## 2023-04-03 DIAGNOSIS — H25.13 AGE-RELATED NUCLEAR CATARACT OF BOTH EYES: ICD-10-CM

## 2023-04-03 DIAGNOSIS — I10 ESSENTIAL HYPERTENSION: ICD-10-CM

## 2023-04-03 DIAGNOSIS — R97.20 ELEVATED PSA: ICD-10-CM

## 2023-04-03 DIAGNOSIS — E66.01 MORBID OBESITY: ICD-10-CM

## 2023-04-03 DIAGNOSIS — E78.2 MIXED HYPERLIPIDEMIA: ICD-10-CM

## 2023-04-03 DIAGNOSIS — M19.049 PRIMARY OSTEOARTHRITIS OF HAND, UNSPECIFIED LATERALITY: ICD-10-CM

## 2023-04-03 PROBLEM — M72.2 PLANTAR FASCIITIS, RIGHT: Status: RESOLVED | Noted: 2020-01-31 | Resolved: 2023-04-03

## 2023-04-03 PROBLEM — Z28.9 DELAYED IMMUNIZATIONS: Status: RESOLVED | Noted: 2019-01-29 | Resolved: 2023-04-03

## 2023-04-03 PROCEDURE — 1159F MED LIST DOCD IN RCRD: CPT | Mod: HCNC,CPTII,S$GLB, | Performed by: NURSE PRACTITIONER

## 2023-04-03 PROCEDURE — 1160F PR REVIEW ALL MEDS BY PRESCRIBER/CLIN PHARMACIST DOCUMENTED: ICD-10-PCS | Mod: HCNC,CPTII,S$GLB, | Performed by: NURSE PRACTITIONER

## 2023-04-03 PROCEDURE — 3078F PR MOST RECENT DIASTOLIC BLOOD PRESSURE < 80 MM HG: ICD-10-PCS | Mod: HCNC,CPTII,S$GLB, | Performed by: NURSE PRACTITIONER

## 2023-04-03 PROCEDURE — 99999 PR PBB SHADOW E&M-EST. PATIENT-LVL IV: ICD-10-PCS | Mod: PBBFAC,HCNC,, | Performed by: NURSE PRACTITIONER

## 2023-04-03 PROCEDURE — 99999 PR PBB SHADOW E&M-EST. PATIENT-LVL IV: CPT | Mod: PBBFAC,HCNC,, | Performed by: NURSE PRACTITIONER

## 2023-04-03 PROCEDURE — 1101F PT FALLS ASSESS-DOCD LE1/YR: CPT | Mod: HCNC,CPTII,S$GLB, | Performed by: NURSE PRACTITIONER

## 2023-04-03 PROCEDURE — 4010F PR ACE/ARB THEARPY RXD/TAKEN: ICD-10-PCS | Mod: HCNC,CPTII,S$GLB, | Performed by: NURSE PRACTITIONER

## 2023-04-03 PROCEDURE — 3074F PR MOST RECENT SYSTOLIC BLOOD PRESSURE < 130 MM HG: ICD-10-PCS | Mod: HCNC,CPTII,S$GLB, | Performed by: NURSE PRACTITIONER

## 2023-04-03 PROCEDURE — G0439 PR MEDICARE ANNUAL WELLNESS SUBSEQUENT VISIT: ICD-10-PCS | Mod: HCNC,S$GLB,, | Performed by: NURSE PRACTITIONER

## 2023-04-03 PROCEDURE — 1101F PR PT FALLS ASSESS DOC 0-1 FALLS W/OUT INJ PAST YR: ICD-10-PCS | Mod: HCNC,CPTII,S$GLB, | Performed by: NURSE PRACTITIONER

## 2023-04-03 PROCEDURE — 3288F PR FALLS RISK ASSESSMENT DOCUMENTED: ICD-10-PCS | Mod: HCNC,CPTII,S$GLB, | Performed by: NURSE PRACTITIONER

## 2023-04-03 PROCEDURE — 1125F AMNT PAIN NOTED PAIN PRSNT: CPT | Mod: HCNC,CPTII,S$GLB, | Performed by: NURSE PRACTITIONER

## 2023-04-03 PROCEDURE — 3074F SYST BP LT 130 MM HG: CPT | Mod: HCNC,CPTII,S$GLB, | Performed by: NURSE PRACTITIONER

## 2023-04-03 PROCEDURE — G0439 PPPS, SUBSEQ VISIT: HCPCS | Mod: HCNC,S$GLB,, | Performed by: NURSE PRACTITIONER

## 2023-04-03 PROCEDURE — 1170F FXNL STATUS ASSESSED: CPT | Mod: HCNC,CPTII,S$GLB, | Performed by: NURSE PRACTITIONER

## 2023-04-03 PROCEDURE — 1170F PR FUNCTIONAL STATUS ASSESSED: ICD-10-PCS | Mod: HCNC,CPTII,S$GLB, | Performed by: NURSE PRACTITIONER

## 2023-04-03 PROCEDURE — 4010F ACE/ARB THERAPY RXD/TAKEN: CPT | Mod: HCNC,CPTII,S$GLB, | Performed by: NURSE PRACTITIONER

## 2023-04-03 PROCEDURE — 3288F FALL RISK ASSESSMENT DOCD: CPT | Mod: HCNC,CPTII,S$GLB, | Performed by: NURSE PRACTITIONER

## 2023-04-03 PROCEDURE — 1160F RVW MEDS BY RX/DR IN RCRD: CPT | Mod: HCNC,CPTII,S$GLB, | Performed by: NURSE PRACTITIONER

## 2023-04-03 PROCEDURE — 1159F PR MEDICATION LIST DOCUMENTED IN MEDICAL RECORD: ICD-10-PCS | Mod: HCNC,CPTII,S$GLB, | Performed by: NURSE PRACTITIONER

## 2023-04-03 PROCEDURE — 3008F PR BODY MASS INDEX (BMI) DOCUMENTED: ICD-10-PCS | Mod: HCNC,CPTII,S$GLB, | Performed by: NURSE PRACTITIONER

## 2023-04-03 PROCEDURE — 1125F PR PAIN SEVERITY QUANTIFIED, PAIN PRESENT: ICD-10-PCS | Mod: HCNC,CPTII,S$GLB, | Performed by: NURSE PRACTITIONER

## 2023-04-03 PROCEDURE — 3078F DIAST BP <80 MM HG: CPT | Mod: HCNC,CPTII,S$GLB, | Performed by: NURSE PRACTITIONER

## 2023-04-03 PROCEDURE — 3008F BODY MASS INDEX DOCD: CPT | Mod: HCNC,CPTII,S$GLB, | Performed by: NURSE PRACTITIONER

## 2023-04-03 NOTE — PATIENT INSTRUCTIONS
Counseling and Referral of Other Preventative  (Italic type indicates deductible and co-insurance are waived)    Patient Name: Juan Shepherd  Today's Date: 4/3/2023    Health Maintenance       Date Due Completion Date    COVID-19 Vaccine (4 - Booster for Pfizer series) 12/09/2021 10/14/2021    Colorectal Cancer Screening 05/26/2023 5/26/2022    Lipid Panel 02/02/2024 2/2/2023    PROSTATE-SPECIFIC ANTIGEN 03/21/2024 3/21/2023    TETANUS VACCINE 11/29/2027 11/29/2017        No orders of the defined types were placed in this encounter.      The following information is provided to all patients.  This information is to help you find resources for any of the problems found today that may be affecting your health:                Living healthy guide: www.[facility].[place].gov      Understanding Diabetes: www.diabetes.org      Eating healthy: www.cdc.gov/healthyweight      CDC home safety checklist: www.cdc.gov/steadi/patient.html      Agency on Aging: www.goea.[place].[facility]      Alcoholics anonymous (AA): www.aa.org      Physical Activity: www.seema.nih.gov/ej5shui      Tobacco use: www.quitwithusla.org

## 2023-04-03 NOTE — PROGRESS NOTES
"  Juan Shepherd presented for a  Medicare AWV and comprehensive Health Risk Assessment today. The following components were reviewed and updated:    Medical history  Family History  Social history  Allergies and Current Medications  Health Risk Assessment  Health Maintenance  Care Team         ** See Completed Assessments for Annual Wellness Visit within the encounter summary.**         The following assessments were completed:  Living Situation  CAGE  Depression Screening  Timed Get Up and Go  Whisper Test  Cognitive Function Screening  Nutrition Screening  ADL Screening  PAQ Screening          Vitals:    04/03/23 0955   BP: 128/70   BP Location: Left arm   Patient Position: Sitting   BP Method: Medium (Manual)   Pulse: 73   Temp: 98.6 °F (37 °C)   TempSrc: Temporal   SpO2: 98%   Weight: 133.3 kg (293 lb 14 oz)   Height: 5' 10" (1.778 m)     Body mass index is 42.17 kg/m².  Physical Exam  Constitutional:       General: He is not in acute distress.     Appearance: Normal appearance. He is obese. He is not ill-appearing.   HENT:      Head: Normocephalic.   Eyes:      Conjunctiva/sclera: Conjunctivae normal.   Cardiovascular:      Rate and Rhythm: Normal rate.   Pulmonary:      Effort: Pulmonary effort is normal. No respiratory distress.   Skin:     General: Skin is warm and dry.   Neurological:      Mental Status: He is alert and oriented to person, place, and time.      Gait: Gait normal.   Psychiatric:         Mood and Affect: Mood normal.         Behavior: Behavior normal.           Diagnoses and health risks identified today and associated recommendations/orders:    1. Encounter for Medicare annual wellness exam  No concerning findings on exam. Discussed health maintenance due including COVID booster which can be completed at retail pharmacy. HRA screenings completed.    - Ambulatory Referral/Consult to Enhanced Annual Wellness Visit (eAWV)    2. Essential hypertension  Stable. BP well controlled. Continue " amlodipine and lisinopril-HCTZ. Followed by PCP    3. Mixed hyperlipidemia  Lab Results   Component Value Date    CHOL 143 02/02/2023    CHOL 146 05/27/2021    CHOL 132 01/31/2020     Lab Results   Component Value Date    HDL 46 02/02/2023    HDL 42 05/27/2021    HDL 43 01/31/2020     Lab Results   Component Value Date    LDLCALC 82.4 02/02/2023    LDLCALC 83.2 05/27/2021    LDLCALC 68.8 01/31/2020     No results found for: DLDL  Lab Results   Component Value Date    TRIG 73 02/02/2023    TRIG 104 05/27/2021    TRIG 101 01/31/2020     Lab Results   Component Value Date    CHOLHDL 32.2 02/02/2023    CHOLHDL 28.8 05/27/2021    CHOLHDL 32.6 01/31/2020   Stable. Continue simvastatin. Followed by PCP.    4. Age-related nuclear cataract of both eyes  Stable. Continue current treatment plan. Followed by Dr. Haynes optometry    5. Morbid obesity  Lost 16 lbs since January 2023 with dietary changes. Applauded and encouraged to keep it up. Counseled on importance of diet and exercise. Encouraged patient to have an active lifestyle with regular exercise with healthy eating. Discussed the potential complications associated with obesity such as bone and joint pain, HTN, diabetes, and hyperlipidemia.     6. Primary osteoarthritis of hand, unspecified laterality  Stable. Continue current treatment plan. Followed by PCP.    7. Elevated PSA  PSA, Screen (ng/mL)   Date Value   02/02/2023 4.6 (H)   Followed by urology. MRI prostate scheduled.      Provided Juan with a 5-10 year written screening schedule and personal prevention plan. Recommendations were developed using the USPSTF age appropriate recommendations. Education, counseling, and referrals were provided as needed. After Visit Summary printed and given to patient which includes a list of additional screenings\tests needed.    Follow up in about 1 year (around 4/3/2024), or if symptoms worsen or fail to improve, for AWV.      ROSIE Padilla      I offered to  discuss advanced care planning, including how to pick a person who would make decisions for you if you were unable to make them for yourself, called a health care power of , and what kind of decisions you might make such as use of life sustaining treatments such as ventilators and tube feeding when faced with a life limiting illness recorded on a living will that they will need to know. (How you want to be cared for as you near the end of your natural life)     X  Patient has advanced directives written and agrees to provide copies to the institution.

## 2023-04-11 ENCOUNTER — TELEPHONE (OUTPATIENT)
Dept: UROLOGY | Facility: CLINIC | Age: 73
End: 2023-04-11
Payer: MEDICARE

## 2023-04-11 ENCOUNTER — PATIENT MESSAGE (OUTPATIENT)
Dept: UROLOGY | Facility: CLINIC | Age: 73
End: 2023-04-11
Payer: MEDICARE

## 2023-04-11 DIAGNOSIS — R97.20 ELEVATED PSA: Primary | ICD-10-CM

## 2023-04-11 NOTE — TELEPHONE ENCOUNTER
Appt zendejas message sent to pt    ----- Message from RT Frank sent at 4/10/2023  8:15 AM CDT -----  Regarding: ASAP Creatinine  Please order and schedule ASAP Creatinine prior to his MRI on 04/13/2023 at 10 am.    Thanks

## 2023-04-13 ENCOUNTER — HOSPITAL ENCOUNTER (OUTPATIENT)
Dept: RADIOLOGY | Facility: HOSPITAL | Age: 73
Discharge: HOME | End: 2023-04-13
Attending: UROLOGY
Payer: MEDICARE

## 2023-04-13 DIAGNOSIS — R97.20 ELEVATED PSA: ICD-10-CM

## 2023-04-13 PROCEDURE — 72197 MRI PELVIS W/O & W/DYE: CPT | Mod: TC,HCNC

## 2023-04-13 PROCEDURE — A9585 GADOBUTROL INJECTION: HCPCS | Mod: HCNC | Performed by: UROLOGY

## 2023-04-13 PROCEDURE — 25500020 PHARM REV CODE 255: Mod: HCNC | Performed by: UROLOGY

## 2023-04-13 PROCEDURE — 72197 MRI PELVIS W/O & W/DYE: CPT | Mod: 26,HCNC,, | Performed by: RADIOLOGY

## 2023-04-13 PROCEDURE — 72197 MRI PROSTATE W W/O CONTRAST: ICD-10-PCS | Mod: 26,HCNC,, | Performed by: RADIOLOGY

## 2023-04-13 RX ORDER — GADOBUTROL 604.72 MG/ML
10 INJECTION INTRAVENOUS
Status: COMPLETED | OUTPATIENT
Start: 2023-04-13 | End: 2023-04-13

## 2023-04-13 RX ADMIN — GADOBUTROL 10 ML: 604.72 INJECTION INTRAVENOUS at 10:04

## 2023-04-28 ENCOUNTER — PATIENT MESSAGE (OUTPATIENT)
Dept: ADMINISTRATIVE | Facility: HOSPITAL | Age: 73
End: 2023-04-28
Payer: MEDICARE

## 2023-05-02 ENCOUNTER — OFFICE VISIT (OUTPATIENT)
Dept: UROLOGY | Facility: CLINIC | Age: 73
End: 2023-05-02
Payer: MEDICARE

## 2023-05-02 VITALS
WEIGHT: 293.88 LBS | SYSTOLIC BLOOD PRESSURE: 120 MMHG | DIASTOLIC BLOOD PRESSURE: 72 MMHG | BODY MASS INDEX: 42.07 KG/M2 | HEART RATE: 69 BPM | HEIGHT: 70 IN

## 2023-05-02 DIAGNOSIS — R97.20 ELEVATED PSA: Primary | ICD-10-CM

## 2023-05-02 PROCEDURE — 3078F PR MOST RECENT DIASTOLIC BLOOD PRESSURE < 80 MM HG: ICD-10-PCS | Mod: HCNC,CPTII,S$GLB, | Performed by: UROLOGY

## 2023-05-02 PROCEDURE — 1101F PT FALLS ASSESS-DOCD LE1/YR: CPT | Mod: HCNC,CPTII,S$GLB, | Performed by: UROLOGY

## 2023-05-02 PROCEDURE — 3074F PR MOST RECENT SYSTOLIC BLOOD PRESSURE < 130 MM HG: ICD-10-PCS | Mod: HCNC,CPTII,S$GLB, | Performed by: UROLOGY

## 2023-05-02 PROCEDURE — 99213 OFFICE O/P EST LOW 20 MIN: CPT | Mod: HCNC,S$GLB,, | Performed by: UROLOGY

## 2023-05-02 PROCEDURE — 1160F RVW MEDS BY RX/DR IN RCRD: CPT | Mod: HCNC,CPTII,S$GLB, | Performed by: UROLOGY

## 2023-05-02 PROCEDURE — 1126F PR PAIN SEVERITY QUANTIFIED, NO PAIN PRESENT: ICD-10-PCS | Mod: HCNC,CPTII,S$GLB, | Performed by: UROLOGY

## 2023-05-02 PROCEDURE — 3288F PR FALLS RISK ASSESSMENT DOCUMENTED: ICD-10-PCS | Mod: HCNC,CPTII,S$GLB, | Performed by: UROLOGY

## 2023-05-02 PROCEDURE — 99213 PR OFFICE/OUTPT VISIT, EST, LEVL III, 20-29 MIN: ICD-10-PCS | Mod: HCNC,S$GLB,, | Performed by: UROLOGY

## 2023-05-02 PROCEDURE — 3074F SYST BP LT 130 MM HG: CPT | Mod: HCNC,CPTII,S$GLB, | Performed by: UROLOGY

## 2023-05-02 PROCEDURE — 4010F PR ACE/ARB THEARPY RXD/TAKEN: ICD-10-PCS | Mod: HCNC,CPTII,S$GLB, | Performed by: UROLOGY

## 2023-05-02 PROCEDURE — 1126F AMNT PAIN NOTED NONE PRSNT: CPT | Mod: HCNC,CPTII,S$GLB, | Performed by: UROLOGY

## 2023-05-02 PROCEDURE — 4010F ACE/ARB THERAPY RXD/TAKEN: CPT | Mod: HCNC,CPTII,S$GLB, | Performed by: UROLOGY

## 2023-05-02 PROCEDURE — 1159F MED LIST DOCD IN RCRD: CPT | Mod: HCNC,CPTII,S$GLB, | Performed by: UROLOGY

## 2023-05-02 PROCEDURE — 1160F PR REVIEW ALL MEDS BY PRESCRIBER/CLIN PHARMACIST DOCUMENTED: ICD-10-PCS | Mod: HCNC,CPTII,S$GLB, | Performed by: UROLOGY

## 2023-05-02 PROCEDURE — 3288F FALL RISK ASSESSMENT DOCD: CPT | Mod: HCNC,CPTII,S$GLB, | Performed by: UROLOGY

## 2023-05-02 PROCEDURE — 3008F BODY MASS INDEX DOCD: CPT | Mod: HCNC,CPTII,S$GLB, | Performed by: UROLOGY

## 2023-05-02 PROCEDURE — 3008F PR BODY MASS INDEX (BMI) DOCUMENTED: ICD-10-PCS | Mod: HCNC,CPTII,S$GLB, | Performed by: UROLOGY

## 2023-05-02 PROCEDURE — 1101F PR PT FALLS ASSESS DOC 0-1 FALLS W/OUT INJ PAST YR: ICD-10-PCS | Mod: HCNC,CPTII,S$GLB, | Performed by: UROLOGY

## 2023-05-02 PROCEDURE — 3078F DIAST BP <80 MM HG: CPT | Mod: HCNC,CPTII,S$GLB, | Performed by: UROLOGY

## 2023-05-02 PROCEDURE — 1159F PR MEDICATION LIST DOCUMENTED IN MEDICAL RECORD: ICD-10-PCS | Mod: HCNC,CPTII,S$GLB, | Performed by: UROLOGY

## 2023-05-02 PROCEDURE — 99999 PR PBB SHADOW E&M-EST. PATIENT-LVL III: CPT | Mod: PBBFAC,HCNC,, | Performed by: UROLOGY

## 2023-05-02 PROCEDURE — 99999 PR PBB SHADOW E&M-EST. PATIENT-LVL III: ICD-10-PCS | Mod: PBBFAC,HCNC,, | Performed by: UROLOGY

## 2023-05-02 NOTE — PROGRESS NOTES
Chief Complaint:  Elevated PSA    HPI:   05/02/2023 - patient returns today for follow-up and review of his MRI, this shows a PI-RADS four lesion mid peripheral posterior right apex    Juan Shepherd is a 72 y.o. male that presents for elevated PSA.  Patient had routine lab work obtained which showed an elevated PSA of 4.6.  Trend over the last several years shows PSA of 3.9 in 2021, 2.7 in 2020, 2.2 in 2019, 1.9 in 2018.  No prior elevated values.  No family history of prostate cancer.  Notes a decent stream but does have some hesitancy if he waits too long.  Denies any gross hematuria or UTIs.  Not currently sexually active as his wife has Parkinson's.    PMH:  Past Medical History:   Diagnosis Date    Arthritis     Cataract     Cough 1/30/2018    Duodenitis     EGD 8/19/2016 (see outside procedure 10/27/2016 under [facility])    Elevated cholesterol     Ex-smoker     Gastritis     EGD 8/19/2016 (see outside procedure 10/27/2016 under [facility])    Hiatal hernia     EGD 8/19/2016 (see outside procedure 10/27/2016 under [facility])    Hypertension     Morbid obesity     Pre-operative clearance 7/30/2018    Reflux esophagitis     EGD 8/19/2016 (see outside procedure 10/27/2016 under [facility])    Tinnitus     and hL eval by ent diana       PSH:  Past Surgical History:   Procedure Laterality Date    CHOLECYSTECTOMY      COLONOSCOPY      FINGER SURGERY      HAND SURGERY      HERNIA REPAIR      VASECTOMY         Family History:  Family History   Problem Relation Age of Onset    Glaucoma Mother     Pulmonary embolism Mother     Dementia Father     Anxiety disorder Father     Post-traumatic stress disorder Father     Restless legs syndrome Sister     Edema Sister     Polymyalgia rheumatica Brother     Clotting disorder Brother     Skin cancer Daughter     No Known Problems Son     No Known Problems Brother     No Known Problems Sister        Social History:  Social History     Tobacco Use    Smoking status: Former     Packs/day: 0.50      Years: 6.00     Pack years: 3.00     Types: Cigarettes     Quit date:      Years since quittin.3    Smokeless tobacco: Never    Tobacco comments:     light smoker quit over 36 y/   Substance Use Topics    Alcohol use: Yes     Comment: occasionally    Drug use: No        Review of Systems:  General: No fever, chills  Skin: No rashes  Chest:  Denies cough and sputum production  Heart: Denies chest pain  Resp: Denies dyspnea  Abdomen: Denies diarrhea, abdominal pain, hematemesis, or blood in stool.  Musculoskeletal: No joint stiffness or swelling. Denies back pain.  : see HPI  Neuro: no dizziness or weakness    Allergies:  Sulfamethoxazole-trimethoprim    Medications:    Current Outpatient Medications:     amLODIPine (NORVASC) 10 MG tablet, Take 1 tablet (10 mg total) by mouth once daily., Disp: 90 tablet, Rfl: 3    aspirin (ECOTRIN) 81 MG EC tablet, Take 81 mg by mouth once daily., Disp: , Rfl:     esomeprazole (NEXIUM) 40 MG capsule, Take 1 capsule (40 mg total) by mouth once daily., Disp: 90 capsule, Rfl: 3    lisinopriL-hydrochlorothiazide (PRINZIDE,ZESTORETIC) 20-12.5 mg per tablet, Take 1 tablet by mouth once daily., Disp: 90 tablet, Rfl: 3    simvastatin (ZOCOR) 40 MG tablet, Take 1 tablet (40 mg total) by mouth every evening., Disp: 90 tablet, Rfl: 3    Physical Exam:  Vitals:    23 1519   BP: 120/72   Pulse: 69     Body mass index is 42.17 kg/m².  General: awake, alert, cooperative  Head: NC/AT  Ears: external ears normal  Eyes: sclera normal  Lungs: normal inspiration, NAD  Heart: well-perfused  Abdomen: Soft, NT, ND   3/23: Normal circ'd phallus, meatus normal in size and position, BL testicles palpable, no masses, nontender, no abnormalities of epididymi  SOCRATES 3/23: Normal rectal tone, no hemorrhoids. Prostate smooth and normal, no nodules 40 gm SV not palpable. Perineum and anus normal.  Lymphatic: groin nodes negative  Skin: The skin is warm and dry  Ext: No c/c/e.  Neuro: grossly  intact, AOx3    RADIOLOGY:   MRI PROSTATE W W/O CONTRAST 04/13/2023     CLINICAL HISTORY: Elevated PSA.  Evaluate for locoregional spread of disease.     COMPARISON: None     TECHNIQUE: MRI of the prostate and pelvis was performed on a scanner utilizing the torso phased array coil.  High-resolution, small field-of-view T2-weighted images were obtained through the prostate in sagittal axial and coronal planes.  Small field-of-view dynamic T1 weighted images through the prostate were also obtained before, during, and after the administration of intravenous gadolinium.  Subsequently, larger field-of-view 3-D T1 weighted axial images were obtained through the pelvis.  Diffusion-weighted imaging was performed and interpreted in the large and small field of view.     3-D reconstructions: 3-D reconstructions were ordered by the referring physician to generate a 3-D model of the prostate gland with target lesion mapping as needed for subsequent direct or fusion prostate biopsy.  I, the interpreting radiologist, performed the reconstruction on independent workstation, either Wendy [employer] and/or [employer] with report and key images saved to PACS.     CONTRAST: 10 mL of gadavist     FINDINGS:  Examination is limited by motion artifact.     Prostate: The prostate is enlarged measuring 5.1 x 4.7 x 4.7 cm corresponding to an volume of approximately 49.03 cc.     The hyperplastic nodules noted in the transitional zone.     Suspicious lesion demonstrating nodular enhancement and restricted diffusion and T2 hyperintense signal posterior medial right peripheral zone at the apex.  It measures 6.1 x 8.0 mm.     Extraprostatic extension / extracapsular invasion: There is no extraprostatic extension.     Neurovascular bundle: Within normal limits.     Seminal vesicles: Normal.     Lymphadenopathy: No evidence of lymphadenopathy.     Adjacent Organ Involvement: There is no focal bladder wall thickening. There is no rectal involvement.         Other Findings: None.        Impression:  Small suspicious lesion right posterior medial peripheral zone at the apex measuring 8 x 6.1 mm.  Target prostate biopsy is recommended.     No evidence of extraprostatic extension.     PIRADS 4: High (clinically significant cancer is likely be present).    LABS:  I personally reviewed the following lab values:  Lab Results   Component Value Date    WBC 5.87 08/21/2020    HGB 15.0 08/21/2020    HCT 46.0 08/21/2020     08/21/2020     02/02/2023    K 5.0 02/02/2023     02/02/2023    CREATININE 1.0 04/13/2023    BUN 14 02/02/2023    CO2 26 02/02/2023    TSH 2.546 01/24/2017    PSA 4.6 (H) 02/02/2023    CHOL 143 02/02/2023    TRIG 73 02/02/2023    HDL 46 02/02/2023    ALT 14 02/02/2023    AST 14 02/02/2023     Assessment/Plan:   Juan Shepherd is a 72 y.o. male with:    Elevated PSA - MRI shows a concerning lesion, will proceed with biopsy    Fidencio Cotto MD  Urology

## 2023-05-30 ENCOUNTER — PROCEDURE VISIT (OUTPATIENT)
Dept: UROLOGY | Facility: CLINIC | Age: 73
End: 2023-05-30
Payer: MEDICARE

## 2023-05-30 VITALS
HEART RATE: 68 BPM | SYSTOLIC BLOOD PRESSURE: 122 MMHG | BODY MASS INDEX: 42.04 KG/M2 | WEIGHT: 293 LBS | DIASTOLIC BLOOD PRESSURE: 81 MMHG

## 2023-05-30 DIAGNOSIS — R97.20 ELEVATED PSA: Primary | ICD-10-CM

## 2023-05-30 PROCEDURE — 88344 IMHCHEM/IMCYTCHM EA MLT ANTB: CPT | Mod: 26,,, | Performed by: PATHOLOGY

## 2023-05-30 PROCEDURE — 96372 PR INJECTION,THERAP/PROPH/DIAG2ST, IM OR SUBCUT: ICD-10-PCS | Mod: 59,S$GLB,, | Performed by: UROLOGY

## 2023-05-30 PROCEDURE — 88305 TISSUE EXAM BY PATHOLOGIST: CPT | Performed by: PATHOLOGY

## 2023-05-30 PROCEDURE — 76872 US TRANSRECTAL: CPT | Mod: 26,S$GLB,, | Performed by: UROLOGY

## 2023-05-30 PROCEDURE — G0416 PROSTATE BIOPSY, ANY MTHD: ICD-10-PCS | Mod: 26,,, | Performed by: PATHOLOGY

## 2023-05-30 PROCEDURE — 55700 PR BIOPSY OF PROSTATE,NEEDLE/PUNCH: CPT | Mod: S$GLB,,, | Performed by: UROLOGY

## 2023-05-30 PROCEDURE — 55700 PR BIOPSY OF PROSTATE,NEEDLE/PUNCH: ICD-10-PCS | Mod: S$GLB,,, | Performed by: UROLOGY

## 2023-05-30 PROCEDURE — 96372 THER/PROPH/DIAG INJ SC/IM: CPT | Mod: 59,S$GLB,, | Performed by: UROLOGY

## 2023-05-30 PROCEDURE — 88344 PR IHC OR ICC EACH MULTIPLEX ANTIBODY STAIN PROCEDURE: ICD-10-PCS | Mod: 26,,, | Performed by: PATHOLOGY

## 2023-05-30 PROCEDURE — G0416 PROSTATE BIOPSY, ANY MTHD: HCPCS | Mod: 26,,, | Performed by: PATHOLOGY

## 2023-05-30 PROCEDURE — 76872 PR US TRANSRECTAL: ICD-10-PCS | Mod: 26,S$GLB,, | Performed by: UROLOGY

## 2023-05-30 RX ORDER — CEFTRIAXONE 1 G/1
1 INJECTION, POWDER, FOR SOLUTION INTRAMUSCULAR; INTRAVENOUS
Status: COMPLETED | OUTPATIENT
Start: 2023-05-30 | End: 2023-05-30

## 2023-05-30 RX ADMIN — CEFTRIAXONE 1 G: 1 INJECTION, POWDER, FOR SOLUTION INTRAMUSCULAR; INTRAVENOUS at 09:05

## 2023-05-30 NOTE — PROCEDURES
Procedures    CC:  Elevated PSA    HPI:  72-year-old male with elevated PSA to 4.6 PI-RADS four lesion right-sided mid apex presents today for biopsy    Procedure: (1) Transrectal Prostate Biopsy                      (2) Transrectal ultrasound of prostate                     (3) Ultrasound Guidance of Prostate Biopsy needles    Detail: After proper consents were obtained, the patient was prepped and draped in normal fashion in the left lateral decubitus position for TRUS/Bx.  5 ml of lidocaine jelly was instilled in the rectum.  The U/S with rectal probe was used to size the prostate.  A spinal needle was used and 20ml of 1% lidocaine was instilled on the side of the prostate at the base of the seminal vesicles.  Biopsy was then performed using an 18Ga biopsy needle directed at the base, mid and apex bilaterally for a total of 12 cores. Antibiotic prophylaxis was provided using IM rocephin.    Findings: The prostate is 52W, 39H, and 47L for volume of 50 grams    Impression/Plan:  - will call with path  - blood per rectum, in urine, and in ejaculate are common  - instructed to present to ED for fevers >101F, inability to void, or other issues    Fidencio Cotto MD

## 2023-05-30 NOTE — PROGRESS NOTES
Per Dr. Cotto IM rocephin 1 G given to pt via right dorsogluteal using aseptic technique. Pt tolerated well. Pt instructed to remain in clinic for 15 minutes after injection to monitor for signs & symptoms of adverse reaction. Pt verbalized understanding.      Loraine Milanes RN

## 2023-06-06 ENCOUNTER — TELEPHONE (OUTPATIENT)
Dept: UROLOGY | Facility: CLINIC | Age: 73
End: 2023-06-06
Payer: MEDICARE

## 2023-06-06 ENCOUNTER — PATIENT MESSAGE (OUTPATIENT)
Dept: UROLOGY | Facility: CLINIC | Age: 73
End: 2023-06-06
Payer: MEDICARE

## 2023-06-06 NOTE — TELEPHONE ENCOUNTER
Follow up appointment already scheduled to discuss Biopsy results, patient notified.     Loraine Milanes RN       ----- Message from Fidencio Cotto MD sent at 6/5/2023  4:43 PM CDT -----  Please schedule f/u to discuss prostate cancer diagnosis, thanks

## 2023-06-09 ENCOUNTER — OFFICE VISIT (OUTPATIENT)
Dept: UROLOGY | Facility: CLINIC | Age: 73
End: 2023-06-09
Payer: MEDICARE

## 2023-06-09 VITALS — WEIGHT: 292.13 LBS | BODY MASS INDEX: 41.82 KG/M2 | HEIGHT: 70 IN

## 2023-06-09 DIAGNOSIS — C61 PROSTATE CANCER: Primary | ICD-10-CM

## 2023-06-09 PROCEDURE — 3008F PR BODY MASS INDEX (BMI) DOCUMENTED: ICD-10-PCS | Mod: CPTII,S$GLB,, | Performed by: UROLOGY

## 2023-06-09 PROCEDURE — 1126F PR PAIN SEVERITY QUANTIFIED, NO PAIN PRESENT: ICD-10-PCS | Mod: CPTII,S$GLB,, | Performed by: UROLOGY

## 2023-06-09 PROCEDURE — 1160F RVW MEDS BY RX/DR IN RCRD: CPT | Mod: CPTII,S$GLB,, | Performed by: UROLOGY

## 2023-06-09 PROCEDURE — 1159F PR MEDICATION LIST DOCUMENTED IN MEDICAL RECORD: ICD-10-PCS | Mod: CPTII,S$GLB,, | Performed by: UROLOGY

## 2023-06-09 PROCEDURE — 99214 OFFICE O/P EST MOD 30 MIN: CPT | Mod: S$GLB,,, | Performed by: UROLOGY

## 2023-06-09 PROCEDURE — 1159F MED LIST DOCD IN RCRD: CPT | Mod: CPTII,S$GLB,, | Performed by: UROLOGY

## 2023-06-09 PROCEDURE — 4010F PR ACE/ARB THEARPY RXD/TAKEN: ICD-10-PCS | Mod: CPTII,S$GLB,, | Performed by: UROLOGY

## 2023-06-09 PROCEDURE — 1126F AMNT PAIN NOTED NONE PRSNT: CPT | Mod: CPTII,S$GLB,, | Performed by: UROLOGY

## 2023-06-09 PROCEDURE — 99999 PR PBB SHADOW E&M-EST. PATIENT-LVL III: CPT | Mod: PBBFAC,,, | Performed by: UROLOGY

## 2023-06-09 PROCEDURE — 3008F BODY MASS INDEX DOCD: CPT | Mod: CPTII,S$GLB,, | Performed by: UROLOGY

## 2023-06-09 PROCEDURE — 4010F ACE/ARB THERAPY RXD/TAKEN: CPT | Mod: CPTII,S$GLB,, | Performed by: UROLOGY

## 2023-06-09 PROCEDURE — 99214 PR OFFICE/OUTPT VISIT, EST, LEVL IV, 30-39 MIN: ICD-10-PCS | Mod: S$GLB,,, | Performed by: UROLOGY

## 2023-06-09 PROCEDURE — 99999 PR PBB SHADOW E&M-EST. PATIENT-LVL III: ICD-10-PCS | Mod: PBBFAC,,, | Performed by: UROLOGY

## 2023-06-09 PROCEDURE — 1160F PR REVIEW ALL MEDS BY PRESCRIBER/CLIN PHARMACIST DOCUMENTED: ICD-10-PCS | Mod: CPTII,S$GLB,, | Performed by: UROLOGY

## 2023-06-17 NOTE — PROGRESS NOTES
Chief Complaint:  Unfavorable intermediate risk prostate cancer    HPI:   06/09/2023 - patient returns today for follow-up and review of his biopsy results, this shows grade group three prostate cancer right base, grade group two prostate cancer right mid and left apex, and grade group one prostate cancer left mid and left base    05/02/2023 - patient returns today for follow-up and review of his MRI, this shows a PI-RADS four lesion mid peripheral posterior right apex    Juan Shepherd is a 72 y.o. male that presents for elevated PSA.  Patient had routine lab work obtained which showed an elevated PSA of 4.6.  Trend over the last several years shows PSA of 3.9 in 2021, 2.7 in 2020, 2.2 in 2019, 1.9 in 2018.  No prior elevated values.  No family history of prostate cancer.  Notes a decent stream but does have some hesitancy if he waits too long.  Denies any gross hematuria or UTIs.  Not currently sexually active as his wife has Parkinson's.    PMH:  Past Medical History:   Diagnosis Date    Arthritis     Cataract     Cough 1/30/2018    Duodenitis     EGD 8/19/2016 (see outside procedure 10/27/2016 under Media)    Elevated cholesterol     Ex-smoker     Gastritis     EGD 8/19/2016 (see outside procedure 10/27/2016 under [facility])    Hiatal hernia     EGD 8/19/2016 (see outside procedure 10/27/2016 under [facility])    Hypertension     Morbid obesity     Pre-operative clearance 7/30/2018    Reflux esophagitis     EGD 8/19/2016 (see outside procedure 10/27/2016 under [facility])    Tinnitus     and hL eval by ent diana       PSH:  Past Surgical History:   Procedure Laterality Date    CHOLECYSTECTOMY      COLONOSCOPY      FINGER SURGERY      HAND SURGERY      HERNIA REPAIR      VASECTOMY         Family History:  Family History   Problem Relation Age of Onset    Glaucoma Mother     Pulmonary embolism Mother     Dementia Father     Anxiety disorder Father     Post-traumatic stress disorder Father     Restless legs syndrome Sister      Edema Sister     Polymyalgia rheumatica Brother     Clotting disorder Brother     Skin cancer Daughter     No Known Problems Son     No Known Problems Brother     No Known Problems Sister        Social History:  Social History     Tobacco Use    Smoking status: Former     Packs/day: 0.50     Years: 6.00     Pack years: 3.00     Types: Cigarettes     Quit date:      Years since quittin.4    Smokeless tobacco: Never    Tobacco comments:     light smoker quit over 36 y/   Substance Use Topics    Alcohol use: Yes     Comment: occasionally    Drug use: No        Review of Systems:  General: No fever, chills  Skin: No rashes  Chest:  Denies cough and sputum production  Heart: Denies chest pain  Resp: Denies dyspnea  Abdomen: Denies diarrhea, abdominal pain, hematemesis, or blood in stool.  Musculoskeletal: No joint stiffness or swelling. Denies back pain.  : see HPI  Neuro: no dizziness or weakness    Allergies:  Sulfamethoxazole-trimethoprim    Medications:    Current Outpatient Medications:     amLODIPine (NORVASC) 10 MG tablet, Take 1 tablet (10 mg total) by mouth once daily., Disp: 90 tablet, Rfl: 3    aspirin (ECOTRIN) 81 MG EC tablet, Take 81 mg by mouth once daily., Disp: , Rfl:     esomeprazole (NEXIUM) 40 MG capsule, Take 1 capsule (40 mg total) by mouth once daily., Disp: 90 capsule, Rfl: 3    lisinopriL-hydrochlorothiazide (PRINZIDE,ZESTORETIC) 20-12.5 mg per tablet, Take 1 tablet by mouth once daily., Disp: 90 tablet, Rfl: 3    simvastatin (ZOCOR) 40 MG tablet, Take 1 tablet (40 mg total) by mouth every evening., Disp: 90 tablet, Rfl: 3    Physical Exam:  There were no vitals filed for this visit.    Body mass index is 41.91 kg/m².  General: awake, alert, cooperative  Head: NC/AT  Ears: external ears normal  Eyes: sclera normal  Lungs: normal inspiration, NAD  Heart: well-perfused  Abdomen: Soft, NT, ND   3/23: Normal circ'd phallus, meatus normal in size and position, BL testicles palpable, no  masses, nontender, no abnormalities of epididymi  SOCRATES 3/23: Normal rectal tone, no hemorrhoids. Prostate smooth and normal, no nodules 40 gm SV not palpable. Perineum and anus normal.  Lymphatic: groin nodes negative  Skin: The skin is warm and dry  Ext: No c/c/e.  Neuro: grossly intact, AOx3    RADIOLOGY:   MRI PROSTATE W W/O CONTRAST 04/13/2023     CLINICAL HISTORY: Elevated PSA.  Evaluate for locoregional spread of disease.     COMPARISON: None     TECHNIQUE: MRI of the prostate and pelvis was performed on a scanner utilizing the torso phased array coil.  High-resolution, small field-of-view T2-weighted images were obtained through the prostate in sagittal axial and coronal planes.  Small field-of-view dynamic T1 weighted images through the prostate were also obtained before, during, and after the administration of intravenous gadolinium.  Subsequently, larger field-of-view 3-D T1 weighted axial images were obtained through the pelvis.  Diffusion-weighted imaging was performed and interpreted in the large and small field of view.     3-D reconstructions: 3-D reconstructions were ordered by the referring physician to generate a 3-D model of the prostate gland with target lesion mapping as needed for subsequent direct or fusion prostate biopsy.  I, the interpreting radiologist, performed the reconstruction on independent workstation, either [employer] CAD and/or [employer] with report and key images saved to PACS.     CONTRAST: 10 mL of gadavist     FINDINGS:  Examination is limited by motion artifact.     Prostate: The prostate is enlarged measuring 5.1 x 4.7 x 4.7 cm corresponding to an volume of approximately 49.03 cc.     The hyperplastic nodules noted in the transitional zone.     Suspicious lesion demonstrating nodular enhancement and restricted diffusion and T2 hyperintense signal posterior medial right peripheral zone at the apex.  It measures 6.1 x 8.0 mm.     Extraprostatic extension / extracapsular invasion:  There is no extraprostatic extension.     Neurovascular bundle: Within normal limits.     Seminal vesicles: Normal.     Lymphadenopathy: No evidence of lymphadenopathy.     Adjacent Organ Involvement: There is no focal bladder wall thickening. There is no rectal involvement.        Other Findings: None.        Impression:  Small suspicious lesion right posterior medial peripheral zone at the apex measuring 8 x 6.1 mm.  Target prostate biopsy is recommended.     No evidence of extraprostatic extension.     PIRADS 4: High (clinically significant cancer is likely be present).    LABS:  I personally reviewed the following lab values:  Lab Results   Component Value Date    WBC 5.87 08/21/2020    HGB 15.0 08/21/2020    HCT 46.0 08/21/2020     08/21/2020     02/02/2023    K 5.0 02/02/2023     02/02/2023    CREATININE 1.0 04/13/2023    BUN 14 02/02/2023    CO2 26 02/02/2023    TSH 2.546 01/24/2017    PSA 4.6 (H) 02/02/2023    CHOL 143 02/02/2023    TRIG 73 02/02/2023    HDL 46 02/02/2023    ALT 14 02/02/2023    AST 14 02/02/2023     RELIAPATH DIAGNOSIS:     A.  PROSTATE, LEFT BASE, NEEDLE BIOPSY:     HISTOLOGIC TYPE: Acinar adenocarcinoma.   HISTOLOGIC GRADE:   Grade Group 1 ([place] Score 3+3=6)   INTRADUCTAL CARCINOMA: Not identified.   CRIBRIFORM GLANDS: Not identified.   TREATMENT EFFECT: Not identified.   TUMOR QUANTITATION:   Number of Cores Positive: 2   Total Number of Cores: 3   Measurement of Tumor: 3 mm.   Measurement of Core Tissue: 22 mm.   Percentage of Prostatic Tissue Involved by Tumor: 5%   LYMPHOVASCULAR INVASION: Not identified.   PERINEURAL INVASION: Not identified.   ADDITIONAL FINDINGS: High grade prostatic intraepithelial neoplasia (HGPIN). (C61.)     B. PROSTATE, LEFT MID, NEEDLE BIOPSY:     HISTOLOGIC TYPE: Acinar adenocarcinoma, see comment.   HISTOLOGIC GRADE:   Grade Group 1 ([place] Score 3+3=6)   INTRADUCTAL CARCINOMA: Not identified.   CRIBRIFORM GLANDS: Not identified.    TREATMENT EFFECT: Not identified.   TUMOR QUANTITATION:   Number of Cores Positive: 2   Total Number of Cores: 2   Measurement of Tumor: 3 mm.   Measurement of Core Tissue: 20 mm.   Percentage of Prostatic Tissue Involved by Tumor: 10%   LYMPHOVASCULAR INVASION: Not identified.   PERINEURAL INVASION: Not identified.   ADDITIONAL FINDINGS: Not identified. (C61.)     C. PROSTATE, LEFT APEX, NEEDLE BIOPSY:     HISTOLOGIC TYPE: Acinar adenocarcinoma.   HISTOLOGIC GRADE:   Grade Group 2 ([place] Score 3+4=7)   Percentage of Pattern 4: 45%   INTRADUCTAL CARCINOMA: Not identified.   CRIBRIFORM GLANDS: Not identified.   TREATMENT EFFECT: Not identified.   TUMOR QUANTITATION:   Number of Cores Positive: 2   Total Number of Cores: 3   Measurement of Tumor: 5 mm.   Measurement of Core Tissue: 30 mm.   Percentage of Prostatic Tissue Involved by Tumor: 15%   LYMPHOVASCULAR INVASION: Not identified.   PERINEURAL INVASION: Not identified.   ADDITIONAL FINDINGS: None identified. (C61.)     D. PROSTATE, RIGHT BASE, NEEDLE BIOPSY:     HISTOLOGIC TYPE: Acinar adenocarcinoma.   HISTOLOGIC GRADE:   Grade Group 3 ([place] Score 4+3=7)   Percentage of Pattern 4: 60%   INTRADUCTAL CARCINOMA: Not identified.   CRIBRIFORM GLANDS: Not identified   TREATMENT EFFECT: Not identified   TUMOR QUANTITATION:   Number of Cores Positive: 2   Total Number of Cores: 2   Measurement of Tumor: 2 mm.   Measurement of Core Tissue: 30 mm.   Percentage of Prostatic Tissue Involved by Tumor: 5%   LYMPHOVASCULAR INVASION: Not identified   PERINEURAL INVASION: Not identified   ADDITIONAL FINDINGS: Acute and chronic inflammation. (C61.)     E. PROSTATE, RIGHT MID, NEEDLE BIOPSY:     HISTOLOGIC TYPE: Acinar adenocarcinoma.   HISTOLOGIC GRADE:   Grade Group 2 (Prem Score 3+4=7)   Percentage of Pattern 4: 25%   INTRADUCTAL CARCINOMA: Present.   CRIBRIFORM GLANDS: Not identified   TREATMENT EFFECT: Not identified   TUMOR QUANTITATION:   Number of Cores Positive:  3   Total Number of Cores: 3   Measurement of Tumor: 16 mm.   Measurement of Core Tissue: 39 mm.   Percentage of Prostatic Tissue Involved by Tumor: 40%   LYMPHOVASCULAR INVASION: Not identified   PERINEURAL INVASION: Not identified   ADDITIONAL FINDINGS: High grade prostatic intraepithelial neoplasia (HGPIN) and chronic   inflammation. (C61.)     F. PROSTATE, RIGHT APEX, NEEDLE BIOPSY:   Small focus of atypical glands, worrisome for adenocarcinoma, see comment. (R97.20)     Assessment/Plan:   Juan Shepherd is a 72 y.o. male with:    Unfavorable intermediate risk prostate cancer - We had an in-depth discussion regarding options including surgery and radiation.  Risks of surgery include pain, bleeding, infection, injury to abdominal structures, injury to the rectum, heart attack, stroke, death.  We also reviewed side effects of surgery including urinary incontinence which can improve up to 1 year out of surgery as well as a erectile dysfunction which can improve up to 2 years from surgery.  We will refer the patient to Radiation Oncology for evaluation, and will have the patient follow up with me in 3-4 weeks.    Total visit time = 34 minutes, of which more than 50% of that time was spent in face to face counseling on prostate cancer options and coordinating care.      Fidencio Cotto MD  Urology

## 2023-06-26 ENCOUNTER — OFFICE VISIT (OUTPATIENT)
Dept: RADIATION ONCOLOGY | Facility: CLINIC | Age: 73
End: 2023-06-26
Attending: RADIOLOGY
Payer: MEDICARE

## 2023-06-26 DIAGNOSIS — C61 PROSTATE CANCER: ICD-10-CM

## 2023-06-26 PROCEDURE — 1160F RVW MEDS BY RX/DR IN RCRD: CPT | Mod: CPTII,95,, | Performed by: RADIOLOGY

## 2023-06-26 PROCEDURE — 1160F PR REVIEW ALL MEDS BY PRESCRIBER/CLIN PHARMACIST DOCUMENTED: ICD-10-PCS | Mod: CPTII,95,, | Performed by: RADIOLOGY

## 2023-06-26 PROCEDURE — 1159F PR MEDICATION LIST DOCUMENTED IN MEDICAL RECORD: ICD-10-PCS | Mod: CPTII,95,, | Performed by: RADIOLOGY

## 2023-06-26 PROCEDURE — 1159F MED LIST DOCD IN RCRD: CPT | Mod: CPTII,95,, | Performed by: RADIOLOGY

## 2023-06-26 PROCEDURE — 99203 PR OFFICE/OUTPT VISIT, NEW, LEVL III, 30-44 MIN: ICD-10-PCS | Mod: 95,,, | Performed by: RADIOLOGY

## 2023-06-26 PROCEDURE — 4010F ACE/ARB THERAPY RXD/TAKEN: CPT | Mod: CPTII,95,, | Performed by: RADIOLOGY

## 2023-06-26 PROCEDURE — 4010F PR ACE/ARB THEARPY RXD/TAKEN: ICD-10-PCS | Mod: CPTII,95,, | Performed by: RADIOLOGY

## 2023-06-26 PROCEDURE — 99203 OFFICE O/P NEW LOW 30 MIN: CPT | Mod: 95,,, | Performed by: RADIOLOGY

## 2023-06-26 NOTE — PROGRESS NOTES
[facility] - Radiation Oncology   The patient location is: home   The chief complaint leading to consultation is: prostate cancer     Visit type: audiovisual    Face to Face time with patient: 20 minutes   45 minutes of total time spent on the encounter, which includes face to face time and non-face to face time preparing to see the patient (eg, review of tests), Obtaining and/or reviewing separately obtained history, Documenting clinical information in the electronic or other health record, Independently interpreting results (not separately reported) and communicating results to the patient/family/caregiver, or Care coordination (not separately reported).     Each patient to whom he or she provides medical services by telemedicine is:  (1) informed of the relationship between the physician and patient and the respective role of any other health care provider with respect to management of the patient; and (2) notified that he or she may decline to receive medical services by telemedicine and may withdraw from such care at any time.    HISTORY OF PRESENT ILLNESS:   This patient presents for discussion of treatment options for a recently diagnosed prostate cancer.     Mr. Shepherd was recently referred to Urology for evaluation of a mildly elevated PSA of 4.6 ng/ml in February of 2023.  SOCRATES was negative.  Repeat PSA remained elevated at 4.1 ng/ml.  MRI on 4/13/23 revealed a 49 cc prostate with a 8 mm T2 hypointense lesion in the Rt. peripheral apex with no extraprostatic extension.  The seminal vesicles and neurovascular bundles were negative.  There was no adenopathy.  Biopsies on 5/30/23 revealed [place] 7 (4+3) adenocarcinoma involving 2 of 2 cores in the Rt. base.  The [place] pattern 4 accounted for 60% of the tumor.  There was Prem 7 (3+4) involving 40% of 3 cores from the Rt. mid gland and 15% of 2 cores from the Lt. apex.  Prem 6 (3+3) adenocarcinoma was involving 2 of 3 cores from the Lt. mid gland and  2 cores from the Lt. base.  There was no perineural or lymphovascular invasion in any of the cores. Overall there was tumor noted in 11 of 15 cores.  The patient presents for discussion of treatment options.  Today the patient states he feels well.  No complaints.       REVIEW OF SYSTEMS:   Review of Systems   Constitutional:  Negative for chills, fever and malaise/fatigue.   Respiratory:  Negative for cough and shortness of breath.    Gastrointestinal:  Negative for abdominal pain, constipation and diarrhea.   Genitourinary:  Negative for dysuria, frequency, hematuria and urgency.       PAST MEDICAL HISTORY:  Past Medical History:   Diagnosis Date    Arthritis     Cataract     Cough 1/30/2018    Duodenitis     EGD 8/19/2016 (see outside procedure 10/27/2016 under [facility])    Elevated cholesterol     Ex-smoker     Gastritis     EGD 8/19/2016 (see outside procedure 10/27/2016 under [facility])    Hiatal hernia     EGD 8/19/2016 (see outside procedure 10/27/2016 under [facility])    Hypertension     Morbid obesity     Pre-operative clearance 7/30/2018    Reflux esophagitis     EGD 8/19/2016 (see outside procedure 10/27/2016 under [facility])    Tinnitus     and hL eval by ent diana       PAST SURGICAL HISTORY:  Past Surgical History:   Procedure Laterality Date    CHOLECYSTECTOMY      COLONOSCOPY      FINGER SURGERY      HAND SURGERY      HERNIA REPAIR      VASECTOMY         ALLERGIES:   Review of patient's allergies indicates:   Allergen Reactions    Sulfamethoxazole-trimethoprim Rash     Other reaction(s): Hives       MEDICATIONS:  Current Outpatient Medications   Medication Sig    amLODIPine (NORVASC) 10 MG tablet Take 1 tablet (10 mg total) by mouth once daily.    aspirin (ECOTRIN) 81 MG EC tablet Take 81 mg by mouth once daily.    esomeprazole (NEXIUM) 40 MG capsule Take 1 capsule (40 mg total) by mouth once daily.    lisinopriL-hydrochlorothiazide (PRINZIDE,ZESTORETIC) 20-12.5 mg per tablet Take 1 tablet by mouth once daily.     simvastatin (ZOCOR) 40 MG tablet Take 1 tablet (40 mg total) by mouth every evening.     No current facility-administered medications for this visit.       SOCIAL HISTORY:  Social History     Socioeconomic History    Marital status:     Number of children: 2   Tobacco Use    Smoking status: Former     Packs/day: 0.50     Years: 6.00     Pack years: 3.00     Types: Cigarettes     Quit date:      Years since quittin.5    Smokeless tobacco: Never    Tobacco comments:     light smoker quit over 36 y/   Substance and Sexual Activity    Alcohol use: Yes     Comment: occasionally    Drug use: No    Sexual activity: Yes     Partners: Female   Social History Narrative        Brother dee dee clark    Retired [employer]     Social Determinants of Health     Financial Resource Strain: Low Risk     Difficulty of Paying Living Expenses: Not hard at all   Food Insecurity: No Food Insecurity    Worried About Running Out of Food in the Last Year: Never true    Ran Out of Food in the Last Year: Never true   Transportation Needs: No Transportation Needs    Lack of Transportation (Medical): No    Lack of Transportation (Non-Medical): No   Physical Activity: Unknown    Days of Exercise per Week: 0 days   Stress: No Stress Concern Present    Feeling of Stress : Not at all   Social Connections: Socially Integrated    Frequency of Communication with Friends and Family: More than three times a week    Frequency of Social Gatherings with Friends and Family: Once a week    Attends Anabaptist Services: More than 4 times per year    Active Member of Clubs or Organizations: Yes    Attends Club or Organization Meetings: More than 4 times per year    Marital Status:    Housing Stability: Low Risk     Unable to Pay for Housing in the Last Year: No    Number of Places Lived in the Last Year: 1    Unstable Housing in the Last Year: No       FAMILY HISTORY:  Family History   Problem Relation Age of Onset    Glaucoma  Mother     Pulmonary embolism Mother     Dementia Father     Anxiety disorder Father     Post-traumatic stress disorder Father     Restless legs syndrome Sister     Edema Sister     Polymyalgia rheumatica Brother     Clotting disorder Brother     Skin cancer Daughter     No Known Problems Son     No Known Problems Brother     No Known Problems Sister          PHYSICAL EXAMINATION:  There were no vitals filed for this visit.  Physical Exam  Constitutional:       General: He is not in acute distress.     Appearance: Normal appearance.   Pulmonary:      Effort: Pulmonary effort is normal. No respiratory distress.   Neurological:      Mental Status: He is alert and oriented to person, place, and time.   Psychiatric:         Mood and Affect: Mood normal.         Judgment: Judgment normal.       ASSESSMENT/PLAN:  Clinical stage IIC (T1c, N0, M0, PSA < 10, GG3) prostate cancer.      ECO    I had a long discussion with the patient.  Explained that based on his Prem score and volume of disease, he is considered to have unfavorable intermediate risk prostate cancer.  Discussed the implications of this classification and explained based on his age and health, we would recommend he consider some form of definitive treatment.  Discussed the options of RALP with bilateral lymph node dissection and definitive radiotherapy using external beam with IMRT / IGRT likely combined with short course (6 months) hormonal deprivation therapy.  Discussed the pros and cons of each treatment approach.  Reviewed the procedures, risks and benefits of radiotherapy.  Explained radiotherapy would consist of 70 Gy in 28 fractions. Discussed the rational for combined radiotherapy and hormonal therapy in patients with unfavorable intermediate risk disease.  Discussed the side effects of  hormonal therapy.  Explained based on his  volume of disease, we would favor a combined approach if he elects to proceed with radiotherapy.  The patient is  planned to meet with Dr. Cotto later this week to discuss his options further.  Thank you for allowing us to participate in the care of his patient .      I spent approximately 45 minutes reviewing the available records and evaluating the patient, out of which over 50% of the time was spent face to face with the patient in counseling and coordinating this patient's care.

## 2023-06-30 ENCOUNTER — OFFICE VISIT (OUTPATIENT)
Dept: UROLOGY | Facility: CLINIC | Age: 73
End: 2023-06-30
Payer: MEDICARE

## 2023-06-30 VITALS
HEIGHT: 70 IN | HEART RATE: 89 BPM | BODY MASS INDEX: 41.8 KG/M2 | WEIGHT: 292 LBS | SYSTOLIC BLOOD PRESSURE: 114 MMHG | DIASTOLIC BLOOD PRESSURE: 77 MMHG

## 2023-06-30 DIAGNOSIS — C61 PROSTATE CANCER: Primary | ICD-10-CM

## 2023-06-30 PROCEDURE — 99999 PR PBB SHADOW E&M-EST. PATIENT-LVL III: CPT | Mod: PBBFAC,,, | Performed by: UROLOGY

## 2023-06-30 PROCEDURE — 1160F PR REVIEW ALL MEDS BY PRESCRIBER/CLIN PHARMACIST DOCUMENTED: ICD-10-PCS | Mod: CPTII,S$GLB,, | Performed by: UROLOGY

## 2023-06-30 PROCEDURE — 3008F PR BODY MASS INDEX (BMI) DOCUMENTED: ICD-10-PCS | Mod: CPTII,S$GLB,, | Performed by: UROLOGY

## 2023-06-30 PROCEDURE — 1101F PT FALLS ASSESS-DOCD LE1/YR: CPT | Mod: CPTII,S$GLB,, | Performed by: UROLOGY

## 2023-06-30 PROCEDURE — 3078F DIAST BP <80 MM HG: CPT | Mod: CPTII,S$GLB,, | Performed by: UROLOGY

## 2023-06-30 PROCEDURE — 1101F PR PT FALLS ASSESS DOC 0-1 FALLS W/OUT INJ PAST YR: ICD-10-PCS | Mod: CPTII,S$GLB,, | Performed by: UROLOGY

## 2023-06-30 PROCEDURE — 3008F BODY MASS INDEX DOCD: CPT | Mod: CPTII,S$GLB,, | Performed by: UROLOGY

## 2023-06-30 PROCEDURE — 3288F FALL RISK ASSESSMENT DOCD: CPT | Mod: CPTII,S$GLB,, | Performed by: UROLOGY

## 2023-06-30 PROCEDURE — 1126F PR PAIN SEVERITY QUANTIFIED, NO PAIN PRESENT: ICD-10-PCS | Mod: CPTII,S$GLB,, | Performed by: UROLOGY

## 2023-06-30 PROCEDURE — 1160F RVW MEDS BY RX/DR IN RCRD: CPT | Mod: CPTII,S$GLB,, | Performed by: UROLOGY

## 2023-06-30 PROCEDURE — 3074F PR MOST RECENT SYSTOLIC BLOOD PRESSURE < 130 MM HG: ICD-10-PCS | Mod: CPTII,S$GLB,, | Performed by: UROLOGY

## 2023-06-30 PROCEDURE — 1126F AMNT PAIN NOTED NONE PRSNT: CPT | Mod: CPTII,S$GLB,, | Performed by: UROLOGY

## 2023-06-30 PROCEDURE — 99999 PR PBB SHADOW E&M-EST. PATIENT-LVL III: ICD-10-PCS | Mod: PBBFAC,,, | Performed by: UROLOGY

## 2023-06-30 PROCEDURE — 99213 OFFICE O/P EST LOW 20 MIN: CPT | Mod: S$GLB,,, | Performed by: UROLOGY

## 2023-06-30 PROCEDURE — 3078F PR MOST RECENT DIASTOLIC BLOOD PRESSURE < 80 MM HG: ICD-10-PCS | Mod: CPTII,S$GLB,, | Performed by: UROLOGY

## 2023-06-30 PROCEDURE — 1159F PR MEDICATION LIST DOCUMENTED IN MEDICAL RECORD: ICD-10-PCS | Mod: CPTII,S$GLB,, | Performed by: UROLOGY

## 2023-06-30 PROCEDURE — 3074F SYST BP LT 130 MM HG: CPT | Mod: CPTII,S$GLB,, | Performed by: UROLOGY

## 2023-06-30 PROCEDURE — 4010F PR ACE/ARB THEARPY RXD/TAKEN: ICD-10-PCS | Mod: CPTII,S$GLB,, | Performed by: UROLOGY

## 2023-06-30 PROCEDURE — 99213 PR OFFICE/OUTPT VISIT, EST, LEVL III, 20-29 MIN: ICD-10-PCS | Mod: S$GLB,,, | Performed by: UROLOGY

## 2023-06-30 PROCEDURE — 4010F ACE/ARB THERAPY RXD/TAKEN: CPT | Mod: CPTII,S$GLB,, | Performed by: UROLOGY

## 2023-06-30 PROCEDURE — 1159F MED LIST DOCD IN RCRD: CPT | Mod: CPTII,S$GLB,, | Performed by: UROLOGY

## 2023-06-30 PROCEDURE — 3288F PR FALLS RISK ASSESSMENT DOCUMENTED: ICD-10-PCS | Mod: CPTII,S$GLB,, | Performed by: UROLOGY

## 2023-06-30 NOTE — PROGRESS NOTES
Chief Complaint:  Unfavorable intermediate risk prostate cancer    HPI:   06/30/2023 - returns today for follow-up after meeting with Dr. Diallo, has decided that he would like to proceed with radical prostatectomy    06/09/2023 - patient returns today for follow-up and review of his biopsy results, this shows grade group three prostate cancer right base, grade group two prostate cancer right mid and left apex, and grade group one prostate cancer left mid and left base    05/02/2023 - patient returns today for follow-up and review of his MRI, this shows a PI-RADS four lesion mid peripheral posterior right apex    Juan Shepherd is a 72 y.o. male that presents for elevated PSA.  Patient had routine lab work obtained which showed an elevated PSA of 4.6.  Trend over the last several years shows PSA of 3.9 in 2021, 2.7 in 2020, 2.2 in 2019, 1.9 in 2018.  No prior elevated values.  No family history of prostate cancer.  Notes a decent stream but does have some hesitancy if he waits too long.  Denies any gross hematuria or UTIs.  Not currently sexually active as his wife has Parkinson's.    PMH:  Past Medical History:   Diagnosis Date    Arthritis     Cataract     Cough 1/30/2018    Duodenitis     EGD 8/19/2016 (see outside procedure 10/27/2016 under [facility])    Elevated cholesterol     Ex-smoker     Gastritis     EGD 8/19/2016 (see outside procedure 10/27/2016 under [facility])    Hiatal hernia     EGD 8/19/2016 (see outside procedure 10/27/2016 under [facility])    Hypertension     Morbid obesity     Pre-operative clearance 7/30/2018    Reflux esophagitis     EGD 8/19/2016 (see outside procedure 10/27/2016 under [facility])    Tinnitus     and hL eval by ent diana       PSH:  Past Surgical History:   Procedure Laterality Date    CHOLECYSTECTOMY      COLONOSCOPY      FINGER SURGERY      HAND SURGERY      HERNIA REPAIR      VASECTOMY         Family History:  Family History   Problem Relation Age of Onset    Glaucoma Mother      Pulmonary embolism Mother     Dementia Father     Anxiety disorder Father     Post-traumatic stress disorder Father     Restless legs syndrome Sister     Edema Sister     Polymyalgia rheumatica Brother     Clotting disorder Brother     Skin cancer Daughter     No Known Problems Son     No Known Problems Brother     No Known Problems Sister        Social History:  Social History     Tobacco Use    Smoking status: Former     Packs/day: 0.50     Years: 6.00     Pack years: 3.00     Types: Cigarettes     Quit date:      Years since quittin.5    Smokeless tobacco: Never    Tobacco comments:     light smoker quit over 36 y/   Substance Use Topics    Alcohol use: Yes     Comment: occasionally    Drug use: No        Review of Systems:  General: No fever, chills  Skin: No rashes  Chest:  Denies cough and sputum production  Heart: Denies chest pain  Resp: Denies dyspnea  Abdomen: Denies diarrhea, abdominal pain, hematemesis, or blood in stool.  Musculoskeletal: No joint stiffness or swelling. Denies back pain.  : see HPI  Neuro: no dizziness or weakness    Allergies:  Sulfamethoxazole-trimethoprim    Medications:    Current Outpatient Medications:     amLODIPine (NORVASC) 10 MG tablet, Take 1 tablet (10 mg total) by mouth once daily., Disp: 90 tablet, Rfl: 3    aspirin (ECOTRIN) 81 MG EC tablet, Take 81 mg by mouth once daily., Disp: , Rfl:     esomeprazole (NEXIUM) 40 MG capsule, Take 1 capsule (40 mg total) by mouth once daily., Disp: 90 capsule, Rfl: 3    lisinopriL-hydrochlorothiazide (PRINZIDE,ZESTORETIC) 20-12.5 mg per tablet, Take 1 tablet by mouth once daily., Disp: 90 tablet, Rfl: 3    simvastatin (ZOCOR) 40 MG tablet, Take 1 tablet (40 mg total) by mouth every evening., Disp: 90 tablet, Rfl: 3    Physical Exam:  Vitals:    23 0931   BP: 114/77   Pulse: 89       Body mass index is 41.9 kg/m².  General: awake, alert, cooperative  Head: NC/AT  Ears: external ears normal  Eyes: sclera normal  Lungs:  normal inspiration, NAD  Heart: well-perfused  Abdomen: Soft, NT, ND   3/23: Normal circ'd phallus, meatus normal in size and position, BL testicles palpable, no masses, nontender, no abnormalities of epididymi  SOCRATES 3/23: Normal rectal tone, no hemorrhoids. Prostate smooth and normal, no nodules 40 gm SV not palpable. Perineum and anus normal.  Lymphatic: groin nodes negative  Skin: The skin is warm and dry  Ext: No c/c/e.  Neuro: grossly intact, AOx3    RADIOLOGY:   MRI PROSTATE W W/O CONTRAST 04/13/2023     CLINICAL HISTORY: Elevated PSA.  Evaluate for locoregional spread of disease.     COMPARISON: None     TECHNIQUE: MRI of the prostate and pelvis was performed on a scanner utilizing the torso phased array coil.  High-resolution, small field-of-view T2-weighted images were obtained through the prostate in sagittal axial and coronal planes.  Small field-of-view dynamic T1 weighted images through the prostate were also obtained before, during, and after the administration of intravenous gadolinium.  Subsequently, larger field-of-view 3-D T1 weighted axial images were obtained through the pelvis.  Diffusion-weighted imaging was performed and interpreted in the large and small field of view.     3-D reconstructions: 3-D reconstructions were ordered by the referring physician to generate a 3-D model of the prostate gland with target lesion mapping as needed for subsequent direct or fusion prostate biopsy.  I, the interpreting radiologist, performed the reconstruction on independent workstation, either [facility] and/or [facility] [employer] with report and key images saved to PACS.     CONTRAST: 10 mL of gadavist     FINDINGS:  Examination is limited by motion artifact.     Prostate: The prostate is enlarged measuring 5.1 x 4.7 x 4.7 cm corresponding to an volume of approximately 49.03 cc.     The hyperplastic nodules noted in the transitional zone.     Suspicious lesion demonstrating nodular enhancement and restricted diffusion  and T2 hyperintense signal posterior medial right peripheral zone at the apex.  It measures 6.1 x 8.0 mm.     Extraprostatic extension / extracapsular invasion: There is no extraprostatic extension.     Neurovascular bundle: Within normal limits.     Seminal vesicles: Normal.     Lymphadenopathy: No evidence of lymphadenopathy.     Adjacent Organ Involvement: There is no focal bladder wall thickening. There is no rectal involvement.        Other Findings: None.        Impression:  Small suspicious lesion right posterior medial peripheral zone at the apex measuring 8 x 6.1 mm.  Target prostate biopsy is recommended.     No evidence of extraprostatic extension.     PIRADS 4: High (clinically significant cancer is likely be present).    LABS:  I personally reviewed the following lab values:  Lab Results   Component Value Date    WBC 5.87 08/21/2020    HGB 15.0 08/21/2020    HCT 46.0 08/21/2020     08/21/2020     02/02/2023    K 5.0 02/02/2023     02/02/2023    CREATININE 1.0 04/13/2023    BUN 14 02/02/2023    CO2 26 02/02/2023    TSH 2.546 01/24/2017    PSA 4.6 (H) 02/02/2023    CHOL 143 02/02/2023    TRIG 73 02/02/2023    HDL 46 02/02/2023    ALT 14 02/02/2023    AST 14 02/02/2023     RELIAPATH DIAGNOSIS:     A.  PROSTATE, LEFT BASE, NEEDLE BIOPSY:     HISTOLOGIC TYPE: Acinar adenocarcinoma.   HISTOLOGIC GRADE:   Grade Group 1 (Charlotte Score 3+3=6)   INTRADUCTAL CARCINOMA: Not identified.   CRIBRIFORM GLANDS: Not identified.   TREATMENT EFFECT: Not identified.   TUMOR QUANTITATION:   Number of Cores Positive: 2   Total Number of Cores: 3   Measurement of Tumor: 3 mm.   Measurement of Core Tissue: 22 mm.   Percentage of Prostatic Tissue Involved by Tumor: 5%   LYMPHOVASCULAR INVASION: Not identified.   PERINEURAL INVASION: Not identified.   ADDITIONAL FINDINGS: High grade prostatic intraepithelial neoplasia (HGPIN). (C61.)     B. PROSTATE, LEFT MID, NEEDLE BIOPSY:     HISTOLOGIC TYPE: Acinar  adenocarcinoma, see comment.   HISTOLOGIC GRADE:   Grade Group 1 ([place] Score 3+3=6)   INTRADUCTAL CARCINOMA: Not identified.   CRIBRIFORM GLANDS: Not identified.   TREATMENT EFFECT: Not identified.   TUMOR QUANTITATION:   Number of Cores Positive: 2   Total Number of Cores: 2   Measurement of Tumor: 3 mm.   Measurement of Core Tissue: 20 mm.   Percentage of Prostatic Tissue Involved by Tumor: 10%   LYMPHOVASCULAR INVASION: Not identified.   PERINEURAL INVASION: Not identified.   ADDITIONAL FINDINGS: Not identified. (C61.)     C. PROSTATE, LEFT APEX, NEEDLE BIOPSY:     HISTOLOGIC TYPE: Acinar adenocarcinoma.   HISTOLOGIC GRADE:   Grade Group 2 (Prem Score 3+4=7)   Percentage of Pattern 4: 45%   INTRADUCTAL CARCINOMA: Not identified.   CRIBRIFORM GLANDS: Not identified.   TREATMENT EFFECT: Not identified.   TUMOR QUANTITATION:   Number of Cores Positive: 2   Total Number of Cores: 3   Measurement of Tumor: 5 mm.   Measurement of Core Tissue: 30 mm.   Percentage of Prostatic Tissue Involved by Tumor: 15%   LYMPHOVASCULAR INVASION: Not identified.   PERINEURAL INVASION: Not identified.   ADDITIONAL FINDINGS: None identified. (C61.)     D. PROSTATE, RIGHT BASE, NEEDLE BIOPSY:     HISTOLOGIC TYPE: Acinar adenocarcinoma.   HISTOLOGIC GRADE:   Grade Group 3 (Strathmore Score 4+3=7)   Percentage of Pattern 4: 60%   INTRADUCTAL CARCINOMA: Not identified.   CRIBRIFORM GLANDS: Not identified   TREATMENT EFFECT: Not identified   TUMOR QUANTITATION:   Number of Cores Positive: 2   Total Number of Cores: 2   Measurement of Tumor: 2 mm.   Measurement of Core Tissue: 30 mm.   Percentage of Prostatic Tissue Involved by Tumor: 5%   LYMPHOVASCULAR INVASION: Not identified   PERINEURAL INVASION: Not identified   ADDITIONAL FINDINGS: Acute and chronic inflammation. (C61.)     E. PROSTATE, RIGHT MID, NEEDLE BIOPSY:     HISTOLOGIC TYPE: Acinar adenocarcinoma.   HISTOLOGIC GRADE:   Grade Group 2 (Prem Score 3+4=7)   Percentage of  Pattern 4: 25%   INTRADUCTAL CARCINOMA: Present.   CRIBRIFORM GLANDS: Not identified   TREATMENT EFFECT: Not identified   TUMOR QUANTITATION:   Number of Cores Positive: 3   Total Number of Cores: 3   Measurement of Tumor: 16 mm.   Measurement of Core Tissue: 39 mm.   Percentage of Prostatic Tissue Involved by Tumor: 40%   LYMPHOVASCULAR INVASION: Not identified   PERINEURAL INVASION: Not identified   ADDITIONAL FINDINGS: High grade prostatic intraepithelial neoplasia (HGPIN) and chronic   inflammation. (C61.)     F. PROSTATE, RIGHT APEX, NEEDLE BIOPSY:   Small focus of atypical glands, worrisome for adenocarcinoma, see comment. (R97.20)     Assessment/Plan:   Juan Shepherd is a 72 y.o. male with:    Unfavorable intermediate risk prostate cancer - We again had an in-depth discussion regarding options including surgery and radiation.  Risks of surgery include pain, bleeding, infection, injury to abdominal structures, injury to the rectum, heart attack, stroke, death.  We also reviewed side effects of surgery including urinary incontinence which can improve up to 1 year out of surgery as well as erectile dysfunction which can improve up to 2 years from surgery. He wants to proceed with radical prostatectomy. Will schedule with Dr Rose to discuss further. Will be happy to see him post-op for f/u.      Fidencio Cotto MD  Urology

## 2023-07-07 ENCOUNTER — TELEPHONE (OUTPATIENT)
Dept: RADIATION ONCOLOGY | Facility: CLINIC | Age: 73
End: 2023-07-07
Payer: MEDICARE

## 2023-07-07 ENCOUNTER — LAB VISIT (OUTPATIENT)
Dept: LAB | Facility: HOSPITAL | Age: 73
End: 2023-07-07
Attending: NURSE PRACTITIONER
Payer: MEDICARE

## 2023-07-07 ENCOUNTER — TELEPHONE (OUTPATIENT)
Dept: INTERNAL MEDICINE | Facility: CLINIC | Age: 73
End: 2023-07-07
Payer: MEDICARE

## 2023-07-07 ENCOUNTER — OFFICE VISIT (OUTPATIENT)
Dept: INTERNAL MEDICINE | Facility: CLINIC | Age: 73
End: 2023-07-07
Payer: MEDICARE

## 2023-07-07 VITALS
TEMPERATURE: 99 F | SYSTOLIC BLOOD PRESSURE: 108 MMHG | BODY MASS INDEX: 41.18 KG/M2 | OXYGEN SATURATION: 95 % | WEIGHT: 287.69 LBS | DIASTOLIC BLOOD PRESSURE: 68 MMHG | HEART RATE: 60 BPM | HEIGHT: 70 IN

## 2023-07-07 DIAGNOSIS — E66.01 MORBID OBESITY: ICD-10-CM

## 2023-07-07 DIAGNOSIS — M25.50 MULTIPLE JOINT PAIN: Primary | ICD-10-CM

## 2023-07-07 DIAGNOSIS — C61 PROSTATE CANCER: ICD-10-CM

## 2023-07-07 DIAGNOSIS — M25.50 MULTIPLE JOINT PAIN: ICD-10-CM

## 2023-07-07 DIAGNOSIS — I10 ESSENTIAL HYPERTENSION: ICD-10-CM

## 2023-07-07 LAB
ALBUMIN SERPL BCP-MCNC: 3.5 G/DL (ref 3.5–5.2)
ALP SERPL-CCNC: 90 U/L (ref 55–135)
ALT SERPL W/O P-5'-P-CCNC: 23 U/L (ref 10–44)
ANION GAP SERPL CALC-SCNC: 12 MMOL/L (ref 8–16)
AST SERPL-CCNC: 18 U/L (ref 10–40)
BASOPHILS # BLD AUTO: 0.04 K/UL (ref 0–0.2)
BASOPHILS NFR BLD: 0.5 % (ref 0–1.9)
BILIRUB SERPL-MCNC: 0.4 MG/DL (ref 0.1–1)
BUN SERPL-MCNC: 14 MG/DL (ref 8–23)
CALCIUM SERPL-MCNC: 9.7 MG/DL (ref 8.7–10.5)
CHLORIDE SERPL-SCNC: 102 MMOL/L (ref 95–110)
CO2 SERPL-SCNC: 26 MMOL/L (ref 23–29)
CREAT SERPL-MCNC: 0.9 MG/DL (ref 0.5–1.4)
CRP SERPL-MCNC: 48.8 MG/L (ref 0–8.2)
DIFFERENTIAL METHOD: ABNORMAL
EOSINOPHIL # BLD AUTO: 0.2 K/UL (ref 0–0.5)
EOSINOPHIL NFR BLD: 2 % (ref 0–8)
ERYTHROCYTE [DISTWIDTH] IN BLOOD BY AUTOMATED COUNT: 13.2 % (ref 11.5–14.5)
EST. GFR  (NO RACE VARIABLE): >60 ML/MIN/1.73 M^2
GLUCOSE SERPL-MCNC: 84 MG/DL (ref 70–110)
HCT VFR BLD AUTO: 41.2 % (ref 40–54)
HGB BLD-MCNC: 13.5 G/DL (ref 14–18)
IMM GRANULOCYTES # BLD AUTO: 0.02 K/UL (ref 0–0.04)
IMM GRANULOCYTES NFR BLD AUTO: 0.3 % (ref 0–0.5)
LYMPHOCYTES # BLD AUTO: 2 K/UL (ref 1–4.8)
LYMPHOCYTES NFR BLD: 26.2 % (ref 18–48)
MCH RBC QN AUTO: 30.5 PG (ref 27–31)
MCHC RBC AUTO-ENTMCNC: 32.8 G/DL (ref 32–36)
MCV RBC AUTO: 93 FL (ref 82–98)
MONOCYTES # BLD AUTO: 0.7 K/UL (ref 0.3–1)
MONOCYTES NFR BLD: 9.6 % (ref 4–15)
NEUTROPHILS # BLD AUTO: 4.7 K/UL (ref 1.8–7.7)
NEUTROPHILS NFR BLD: 61.4 % (ref 38–73)
NRBC BLD-RTO: 0 /100 WBC
PLATELET # BLD AUTO: 270 K/UL (ref 150–450)
PMV BLD AUTO: 9.7 FL (ref 9.2–12.9)
POTASSIUM SERPL-SCNC: 4.6 MMOL/L (ref 3.5–5.1)
PROT SERPL-MCNC: 7.4 G/DL (ref 6–8.4)
RBC # BLD AUTO: 4.43 M/UL (ref 4.6–6.2)
RHEUMATOID FACT SERPL-ACNC: <13 IU/ML (ref 0–15)
SODIUM SERPL-SCNC: 140 MMOL/L (ref 136–145)
WBC # BLD AUTO: 7.59 K/UL (ref 3.9–12.7)

## 2023-07-07 PROCEDURE — 3008F BODY MASS INDEX DOCD: CPT | Mod: CPTII,S$GLB,, | Performed by: NURSE PRACTITIONER

## 2023-07-07 PROCEDURE — 3074F SYST BP LT 130 MM HG: CPT | Mod: CPTII,S$GLB,, | Performed by: NURSE PRACTITIONER

## 2023-07-07 PROCEDURE — 80053 COMPREHEN METABOLIC PANEL: CPT | Mod: PO | Performed by: NURSE PRACTITIONER

## 2023-07-07 PROCEDURE — 86431 RHEUMATOID FACTOR QUANT: CPT | Performed by: NURSE PRACTITIONER

## 2023-07-07 PROCEDURE — 1159F MED LIST DOCD IN RCRD: CPT | Mod: CPTII,S$GLB,, | Performed by: NURSE PRACTITIONER

## 2023-07-07 PROCEDURE — 99214 OFFICE O/P EST MOD 30 MIN: CPT | Mod: S$GLB,,, | Performed by: NURSE PRACTITIONER

## 2023-07-07 PROCEDURE — 3008F PR BODY MASS INDEX (BMI) DOCUMENTED: ICD-10-PCS | Mod: CPTII,S$GLB,, | Performed by: NURSE PRACTITIONER

## 2023-07-07 PROCEDURE — 36415 COLL VENOUS BLD VENIPUNCTURE: CPT | Mod: PO | Performed by: NURSE PRACTITIONER

## 2023-07-07 PROCEDURE — 3288F PR FALLS RISK ASSESSMENT DOCUMENTED: ICD-10-PCS | Mod: CPTII,S$GLB,, | Performed by: NURSE PRACTITIONER

## 2023-07-07 PROCEDURE — 3288F FALL RISK ASSESSMENT DOCD: CPT | Mod: CPTII,S$GLB,, | Performed by: NURSE PRACTITIONER

## 2023-07-07 PROCEDURE — 1159F PR MEDICATION LIST DOCUMENTED IN MEDICAL RECORD: ICD-10-PCS | Mod: CPTII,S$GLB,, | Performed by: NURSE PRACTITIONER

## 2023-07-07 PROCEDURE — 4010F ACE/ARB THERAPY RXD/TAKEN: CPT | Mod: CPTII,S$GLB,, | Performed by: NURSE PRACTITIONER

## 2023-07-07 PROCEDURE — 3078F DIAST BP <80 MM HG: CPT | Mod: CPTII,S$GLB,, | Performed by: NURSE PRACTITIONER

## 2023-07-07 PROCEDURE — 3078F PR MOST RECENT DIASTOLIC BLOOD PRESSURE < 80 MM HG: ICD-10-PCS | Mod: CPTII,S$GLB,, | Performed by: NURSE PRACTITIONER

## 2023-07-07 PROCEDURE — 99214 PR OFFICE/OUTPT VISIT, EST, LEVL IV, 30-39 MIN: ICD-10-PCS | Mod: S$GLB,,, | Performed by: NURSE PRACTITIONER

## 2023-07-07 PROCEDURE — 1101F PT FALLS ASSESS-DOCD LE1/YR: CPT | Mod: CPTII,S$GLB,, | Performed by: NURSE PRACTITIONER

## 2023-07-07 PROCEDURE — 86200 CCP ANTIBODY: CPT | Performed by: NURSE PRACTITIONER

## 2023-07-07 PROCEDURE — 4010F PR ACE/ARB THEARPY RXD/TAKEN: ICD-10-PCS | Mod: CPTII,S$GLB,, | Performed by: NURSE PRACTITIONER

## 2023-07-07 PROCEDURE — 1125F AMNT PAIN NOTED PAIN PRSNT: CPT | Mod: CPTII,S$GLB,, | Performed by: NURSE PRACTITIONER

## 2023-07-07 PROCEDURE — 1125F PR PAIN SEVERITY QUANTIFIED, PAIN PRESENT: ICD-10-PCS | Mod: CPTII,S$GLB,, | Performed by: NURSE PRACTITIONER

## 2023-07-07 PROCEDURE — 86038 ANTINUCLEAR ANTIBODIES: CPT | Performed by: NURSE PRACTITIONER

## 2023-07-07 PROCEDURE — 85025 COMPLETE CBC W/AUTO DIFF WBC: CPT | Mod: PO | Performed by: NURSE PRACTITIONER

## 2023-07-07 PROCEDURE — 3074F PR MOST RECENT SYSTOLIC BLOOD PRESSURE < 130 MM HG: ICD-10-PCS | Mod: CPTII,S$GLB,, | Performed by: NURSE PRACTITIONER

## 2023-07-07 PROCEDURE — 1160F RVW MEDS BY RX/DR IN RCRD: CPT | Mod: CPTII,S$GLB,, | Performed by: NURSE PRACTITIONER

## 2023-07-07 PROCEDURE — 99999 PR PBB SHADOW E&M-EST. PATIENT-LVL III: CPT | Mod: PBBFAC,,, | Performed by: NURSE PRACTITIONER

## 2023-07-07 PROCEDURE — 85652 RBC SED RATE AUTOMATED: CPT | Performed by: NURSE PRACTITIONER

## 2023-07-07 PROCEDURE — 86140 C-REACTIVE PROTEIN: CPT | Mod: PO | Performed by: NURSE PRACTITIONER

## 2023-07-07 PROCEDURE — 1160F PR REVIEW ALL MEDS BY PRESCRIBER/CLIN PHARMACIST DOCUMENTED: ICD-10-PCS | Mod: CPTII,S$GLB,, | Performed by: NURSE PRACTITIONER

## 2023-07-07 PROCEDURE — 1101F PR PT FALLS ASSESS DOC 0-1 FALLS W/OUT INJ PAST YR: ICD-10-PCS | Mod: CPTII,S$GLB,, | Performed by: NURSE PRACTITIONER

## 2023-07-07 PROCEDURE — 99999 PR PBB SHADOW E&M-EST. PATIENT-LVL III: ICD-10-PCS | Mod: PBBFAC,,, | Performed by: NURSE PRACTITIONER

## 2023-07-07 RX ORDER — METHYLPREDNISOLONE 4 MG/1
TABLET ORAL
Qty: 21 EACH | Refills: 0 | Status: SHIPPED | OUTPATIENT
Start: 2023-07-07 | End: 2023-07-18

## 2023-07-07 RX ORDER — MELOXICAM 15 MG/1
15 TABLET ORAL DAILY
Qty: 30 TABLET | Refills: 0 | Status: SHIPPED | OUTPATIENT
Start: 2023-07-07 | End: 2023-08-09

## 2023-07-07 NOTE — TELEPHONE ENCOUNTER
----- Message from Luis Patel sent at 7/7/2023  9:00 AM CDT -----      Name of Who is Calling:PT          What is the request in detail:PT is requesting to be seen before the 07/11 appointment I was able to offer to him. PT states he is in a lot of pain and really wants to be seen today if possible. Please be Advised!          Can the clinic reply by [facility]:no          What Number to Call Back if not in [phone]

## 2023-07-07 NOTE — ASSESSMENT & PLAN NOTE
New onset bilateral multi-joint pain to shoulders, hips, and knees. 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 Inflammatory/autoimmune labs today. Start medrol dose david. Meloxicam PRN pain if needed after completing oral steroid. No sign of acute infection today in clinic.

## 2023-07-07 NOTE — PROGRESS NOTES
Subjective:       Patient ID: Juan Shepherd is a 72 y.o. male.    Chief Complaint: Joint Pain    Mr. Shepherd presents to clinic for multi joint pain. He has been experiencing low back pain x 8 months and seeing a chiropractor. Started with bilateral shoulders, hips, and knee pain x 4-5 weeks. Taking Aleve with little relief. Also applying Votaren gel to shoulders which he finds beneficial. Pain is worse in the morning or after sitting. Improves with movement. Denies fever or chills. Denies swelling, warmth, or erythema to joints. Also notes his brother has PMR.      Knee Pain   The incident occurred more than 1 week ago. There was no injury mechanism. The pain is present in the left hip, left knee, right hip and right knee. The quality of the pain is described as aching. The pain has been Constant since onset. Pertinent negatives include no inability to bear weight, loss of motion, loss of sensation, muscle weakness, numbness or tingling. He reports no foreign bodies present. He has tried NSAIDs for the symptoms. The treatment provided mild relief.     Patient Active Problem List   Diagnosis    Mixed hyperlipidemia    Essential hypertension    Family history of glaucoma    Age-related nuclear cataract of both eyes    Morbid obesity    Arthritis of knee    Seborrheic keratosis    Osteoarthritis of hand    Onychomycosis    Chronic allergic rhinitis    Elevated PSA    Prostate cancer    Multiple joint pain       Family History   Problem Relation Age of Onset    Glaucoma Mother     Pulmonary embolism Mother     Dementia Father     Anxiety disorder Father     Post-traumatic stress disorder Father     Restless legs syndrome Sister     Edema Sister     Polymyalgia rheumatica Brother     Clotting disorder Brother     Skin cancer Daughter     No Known Problems Son     No Known Problems Brother     No Known Problems Sister      Past Surgical History:   Procedure Laterality Date    CHOLECYSTECTOMY      COLONOSCOPY      FINGER  "SURGERY      HAND SURGERY      HERNIA REPAIR      VASECTOMY           Current Outpatient Medications:     amLODIPine (NORVASC) 10 MG tablet, Take 1 tablet (10 mg total) by mouth once daily., Disp: 90 tablet, Rfl: 3    aspirin (ECOTRIN) 81 MG EC tablet, Take 81 mg by mouth once daily., Disp: , Rfl:     esomeprazole (NEXIUM) 40 MG capsule, Take 1 capsule (40 mg total) by mouth once daily., Disp: 90 capsule, Rfl: 3    lisinopriL-hydrochlorothiazide (PRINZIDE,ZESTORETIC) 20-12.5 mg per tablet, Take 1 tablet by mouth once daily., Disp: 90 tablet, Rfl: 3    simvastatin (ZOCOR) 40 MG tablet, Take 1 tablet (40 mg total) by mouth every evening., Disp: 90 tablet, Rfl: 3    meloxicam (MOBIC) 15 MG tablet, Take 1 tablet (15 mg total) by mouth once daily., Disp: 30 tablet, Rfl: 0    methylPREDNISolone (MEDROL DOSEPACK) 4 mg tablet, use as directed, Disp: 21 each, Rfl: 0    Review of Systems   Constitutional:  Negative for chills, fever and unexpected weight change.   Respiratory:  Negative for shortness of breath.    Cardiovascular:  Negative for chest pain.   Musculoskeletal:  Positive for arthralgias and back pain. Negative for joint swelling and myalgias.   Skin:  Negative for color change, rash and wound.   Neurological:  Negative for dizziness, tingling, tremors, weakness, numbness and headaches.     Objective:   /68 (BP Location: Left arm, Patient Position: Sitting, BP Method: X-Large (Manual))   Pulse 60   Temp 98.6 °F (37 °C)   Ht 5' 10" (1.778 m)   Wt 130.5 kg (287 lb 11.2 oz)   SpO2 95%   BMI 41.28 kg/m²      Physical Exam  Vitals reviewed.   Constitutional:       General: He is not in acute distress.     Appearance: Normal appearance. He is obese. He is not ill-appearing.   HENT:      Head: Normocephalic and atraumatic.   Eyes:      Conjunctiva/sclera: Conjunctivae normal.   Cardiovascular:      Rate and Rhythm: Normal rate.   Pulmonary:      Effort: Pulmonary effort is normal. No respiratory distress. "   Musculoskeletal:         General: No swelling.      Right shoulder: No swelling, deformity, effusion or tenderness. Decreased range of motion (painful ROM). Normal strength. Normal pulse.      Left shoulder: No swelling, deformity, effusion or tenderness. Decreased range of motion (painful ROM). Normal strength. Normal pulse.      Right hand: Deformity (Heberden's Nodes) present. Decreased strength.      Left hand: Deformity (Heberden's Nodes) present. Decreased strength.      Right hip: Crepitus present. No deformity or lacerations. Normal range of motion.      Left hip: Crepitus present. No deformity or lacerations. Normal range of motion.      Right knee: Crepitus present. No swelling, deformity, effusion, erythema or ecchymosis. Normal range of motion (painful ROM). No tenderness.      Left knee: Crepitus present. No swelling, deformity, effusion, erythema or ecchymosis. Normal range of motion (painful ROM). No tenderness.   Neurological:      Mental Status: He is alert.   Psychiatric:         Mood and Affect: Mood normal.         Behavior: Behavior normal.       Assessment & Plan     1. Multiple joint pain  Assessment & Plan:  New onset bilateral multi-joint pain to shoulders, hips, and knees. 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 Inflammatory/autoimmune labs today. Start medrol dose dvaid. Meloxicam PRN pain if needed after completing oral steroid. No sign of acute infection today in clinic.    Orders:  -     methylPREDNISolone (MEDROL DOSEPACK) 4 mg tablet; use as directed  Dispense: 21 each; Refill: 0  -     meloxicam (MOBIC) 15 MG tablet; Take 1 tablet (15 mg total) by mouth once daily.  Dispense: 30 tablet; Refill: 0  -     C-REACTIVE PROTEIN; Future; Expected date: 07/07/2023  -     TOBI; Future; Expected date: 07/07/2023  -     RHEUMATOID FACTOR; Future; Expected date: 07/07/2023  -     CYCLIC CITRUL PEPTIDE ANTIBODY, IGG; Future; Expected date: 07/07/2023  -     Sedimentation rate; Future; Expected date:  "07/07/2023  -     CBC W/ AUTO DIFFERENTIAL; Future; Expected date: 07/07/2023  -     COMPREHENSIVE METABOLIC PANEL; Future; Expected date: 07/07/2023    2. Essential hypertension  Assessment & Plan:  BP well controlled. Continue current medications as prescribed.      3. Prostate cancer  Assessment & Plan:  Followed by urology. Planning for radical prostatectomy      4. Morbid obesity  Assessment & Plan:  Counseled on importance of diet and exercise. Encouraged patient to have an active lifestyle with regular exercise with healthy eating.             ROSIE Powers      Portions of this note may have been created with voice recognition software. Occasional "wrong-word" or "sound-a-like" substitutions may have occurred due to the inherent limitations of voice recognition software. Please, read the note carefully and recognize, using context, where substitutions have occurred.     "

## 2023-07-08 LAB
CCP AB SER IA-ACNC: <0.5 U/ML
ERYTHROCYTE [SEDIMENTATION RATE] IN BLOOD BY PHOTOMETRIC METHOD: 57 MM/HR (ref 0–23)

## 2023-07-10 LAB — ANA SER QL IF: NORMAL

## 2023-07-11 ENCOUNTER — TELEPHONE (OUTPATIENT)
Dept: INTERNAL MEDICINE | Facility: CLINIC | Age: 73
End: 2023-07-11
Payer: MEDICARE

## 2023-07-11 NOTE — TELEPHONE ENCOUNTER
Discussed results with patient via phone.     Labs unremarkable except for elevated ESR and CRP.     Advised these levels could be elevated due to current prostate cancer pending prostatectomy.     However, clinical presentation is consistent with PMR including bilateral shoulders, hips, and knee pain, morning stiffness, duration >4 weeks, elevated ESR/CRP, and significant improved with oral steroid use. Denies any symptoms of GCA. Denies headache, visual changes, jaw claudication, or fever.     He was started on medrol dose pack with significant improvement in symptoms. Advised with PMR he will like need to be on low dose prednisone (10-15 mg/day) for up to a years.     He will notify clinic if he notices return of symptoms as he gets to the end of dose david. If symptoms returns, will start daily prednisone and repeat ESR/CRP in 4 weeks.     If improvement noted, will reduce prednisone dose by 2.5 mg/day every 2 to 4 weeks until a dose of 10 mg/day achieved. Then reduce prednisone dose by 1 mg/day every 1 to 2 months until discontinuation.    Patient verbalized understanding

## 2023-07-17 ENCOUNTER — TELEPHONE (OUTPATIENT)
Dept: FAMILY MEDICINE | Facility: CLINIC | Age: 73
End: 2023-07-17
Payer: MEDICARE

## 2023-07-17 DIAGNOSIS — M35.3 PMR (POLYMYALGIA RHEUMATICA): Primary | ICD-10-CM

## 2023-07-17 NOTE — TELEPHONE ENCOUNTER
----- Message from Nicholas Brar sent at 7/17/2023  4:20 PM CDT -----  Contact: Juan Reinoso states he is in pain again and was instructed to call once it has returned. Pt would like to be prescribed something to reduce swelling and inflammation. Please call back at [phone]            Thanks  MICHELLE

## 2023-07-17 NOTE — TELEPHONE ENCOUNTER
"Per note from Rosemarie "He will notify clinic if he notices return of symptoms as he gets to the end of dose david. If symptoms returns, will start daily prednisone and repeat ESR/CRP in 4 weeks. "    Pt states he is having pain again.. please advise as Rosemarie is out all week.   "

## 2023-07-17 NOTE — TELEPHONE ENCOUNTER
----- Message from Nicholas Brar sent at 7/17/2023  4:20 PM CDT -----  Contact: Juan Reinoso states he is in pain again and was instructed to call once it has returned. Pt would like to be prescribed something to reduce swelling and inflammation. Please call back at [phone]            Thanks  MICHELLE

## 2023-07-18 ENCOUNTER — TELEPHONE (OUTPATIENT)
Dept: INTERNAL MEDICINE | Facility: CLINIC | Age: 73
End: 2023-07-18
Payer: MEDICARE

## 2023-07-18 RX ORDER — PREDNISONE 10 MG/1
10 TABLET ORAL DAILY
Qty: 30 TABLET | Refills: 0 | Status: SHIPPED | OUTPATIENT
Start: 2023-07-18 | End: 2023-08-04 | Stop reason: SDUPTHER

## 2023-08-03 NOTE — PROGRESS NOTES
Clinic Note  8/3/2023      Subjective:         Chief Complaint:   TIRSO Shepherd is a 72 y.o. male diagnosed with prostate cancer. Consult from Dr Cotto.  Co-morbidities- morbid obesity, HTN, polymyalgia rheumatica.  Lives in [place], retired from [employer]. Here with his wife and daughter.  Has lost 30 pounds on diet since January.  PSH: lap liz, lap umbilical repair.    FH -negative  PSA - 4.6  AJCC Stage - T1C  STAR CAP stage- IIC  Volume - 49 ccs  MRI - 4/13/2023-(1.5T) 0.8 RAPZ PI-RADS 4,MRI negative for EPE (extraprostatic extension), NVBI (neurovascular bundle involvement), SVI (seminal vesicle involvement), or nodes. To my review lesion extends from apex to right mid gland with the largest component at left mid. Borderline PI-RADS 5 with coronal length (1.5 cm).  Biopsy - 5/30/2023- [place] 4+3 (GG3) right base 2/2 5%; Prem 3+4 right middle 2/2 40%, left apex 2/2 15%; [place] 3+3 left base 2/2 5%, left middle 2/2 10%. Overall 5/6 sites, 10/12 cores.  SHAMIR Score - 5 intermediate  NCCN - unfavorable intermediate  Germline testing - not indicated  Somatic testing - discussed  STAR CAP-        Lab Results   Component Value Date    PSA 4.6 (H) 02/02/2023    PSA 3.9 05/27/2021    PSA 2.7 01/31/2020    PSA 2.2 01/31/2019    PSA 1.9 01/24/2018    PSA 2.2 12/08/2016    PSA 1.8 11/19/2015    PSA 1.6 12/03/2014    PSADIAG 4.1 (H) 03/21/2023      Past Medical History:   Diagnosis Date    Arthritis     Cataract     Cough 1/30/2018    Duodenitis     EGD 8/19/2016 (see outside procedure 10/27/2016 under [facility])    Elevated cholesterol     Ex-smoker     Gastritis     EGD 8/19/2016 (see outside procedure 10/27/2016 under Media)    Hiatal hernia     EGD 8/19/2016 (see outside procedure 10/27/2016 under Media)    Hypertension     Morbid obesity     Pre-operative clearance 7/30/2018    Reflux esophagitis     EGD 8/19/2016 (see outside procedure 10/27/2016 under [facility])    Tinnitus     and hL eval by ent diana      Family History   Problem Relation Age of Onset    Glaucoma Mother     Pulmonary embolism Mother     Dementia Father     Anxiety disorder Father     Post-traumatic stress disorder Father     Restless legs syndrome Sister     Edema Sister     Polymyalgia rheumatica Brother     Clotting disorder Brother     Skin cancer Daughter     No Known Problems Son     No Known Problems Brother     No Known Problems Sister      Social History     Socioeconomic History    Marital status:     Number of children: 2   Tobacco Use    Smoking status: Former     Current packs/day: 0.00     Average packs/day: 0.5 packs/day for 6.0 years (3.0 ttl pk-yrs)     Types: Cigarettes     Start date:      Quit date:      Years since quittin.6    Smokeless tobacco: Never    Tobacco comments:     light smoker quit over 36 y/   Substance and Sexual Activity    Alcohol use: Yes     Comment: occasionally    Drug use: No    Sexual activity: Yes     Partners: Female   Social History Narrative        Brother dee dee clark    Retired [employer]     Social Determinants of Health     Financial Resource Strain: Low Risk  (4/3/2023)    Overall Financial Resource Strain (CARDIA)     Difficulty of Paying Living Expenses: Not hard at all   Food Insecurity: No Food Insecurity (4/3/2023)    Hunger Vital Sign     Worried About Running Out of Food in the Last Year: Never true     Ran Out of Food in the Last Year: Never true   Transportation Needs: No Transportation Needs (4/3/2023)    PRAPARE - Transportation     Lack of Transportation (Medical): No     Lack of Transportation (Non-Medical): No   Physical Activity: Unknown (4/3/2023)    Exercise Vital Sign     Days of Exercise per Week: 0 days   Stress: No Stress Concern Present (4/3/2023)    [employer] [place] [employer] - Occupational Stress Questionnaire     Feeling of Stress : Not at all   Social Connections: Socially Integrated (4/3/2023)     Social Connection and Isolation Panel [NHANES]     Frequency of Communication with Friends and Family: More than three times a week     Frequency of Social Gatherings with Friends and Family: Once a week     Attends Denominational Services: More than 4 times per year     Active Member of Clubs or Organizations: Yes     Attends Club or Organization Meetings: More than 4 times per year     Marital Status:    Housing Stability: Low Risk  (4/3/2023)    Housing Stability Vital Sign     Unable to Pay for Housing in the Last Year: No     Number of Places Lived in the Last Year: 1     Unstable Housing in the Last Year: No     Past Surgical History:   Procedure Laterality Date    CHOLECYSTECTOMY      COLONOSCOPY      FINGER SURGERY      HAND SURGERY      HERNIA REPAIR      VASECTOMY       Patient Active Problem List   Diagnosis    Mixed hyperlipidemia    Essential hypertension    Family history of glaucoma    Age-related nuclear cataract of both eyes    Morbid obesity    Arthritis of knee    Seborrheic keratosis    Osteoarthritis of hand    Onychomycosis    Chronic allergic rhinitis    Prostate cancer    Multiple joint pain     Review of Systems   Constitutional:  Negative for appetite change, chills, fatigue, fever and unexpected weight change.   HENT:  Negative for nosebleeds.    Respiratory:  Negative for shortness of breath and wheezing.    Cardiovascular:  Negative for chest pain, palpitations and leg swelling.   Gastrointestinal:  Negative for abdominal distention, abdominal pain, constipation, diarrhea, nausea and vomiting.   Genitourinary:  Positive for frequency and nocturia. Negative for dysuria and hematuria.   Musculoskeletal:  Negative for arthralgias and back pain.   Skin:  Negative for pallor.   Neurological:  Negative for dizziness, seizures and syncope.   Hematological:  Negative for adenopathy.   Psychiatric/Behavioral:  Negative for dysphoric mood.          Objective:     "  There were no vitals taken for this visit.  Estimated body mass index is 41.28 kg/m² as calculated from the following:    Height as of 7/7/23: 5' 10" (1.778 m).    Weight as of 7/7/23: 130.5 kg (287 lb 11.2 oz).  Physical Exam  Constitutional:       General: He is not in acute distress.     Appearance: He is well-developed. He is not diaphoretic.   HENT:      Head: Atraumatic.   Neck:      Trachea: No tracheal deviation.   Cardiovascular:      Rate and Rhythm: Normal rate.   Pulmonary:      Effort: Pulmonary effort is normal. No respiratory distress.      Breath sounds: No wheezing.   Abdominal:      General: Bowel sounds are normal. There is no distension.      Palpations: Abdomen is soft. There is no mass.      Tenderness: There is no abdominal tenderness. There is no guarding or rebound.   Skin:     General: Skin is warm and dry.   Neurological:      Mental Status: He is alert and oriented to person, place, and time.   Psychiatric:         Behavior: Behavior normal.         Thought Content: Thought content normal.         Judgment: Judgment normal.       Assessment and Plan:           Problem List Items Addressed This Visit       Prostate cancer - Primary       Follow up:   Discussed staging and therapeutic options for unfavorable intermediate risk prostate cancer.  Bone scan, Prolaris ordered.  Letter to Octaviano Cotto and [facility].  Today's visit was spent almost entirely on counseling. We reviewed his diagnosis, stage, grade, risk group, and prognosis. We discussed [place] (NCCN) and SHAMIR risk stratification  We discussed the concept of low risk, intermediate risk, and high risk disease. We also reviewed the NCCN treatment nomogram.We discussed the different treatment options including active surveillance (as well as the surveillance protocol), prostate brachytherapy, EBRT, SBRT (stereotactic body radiation therapy),cryotherapy, HIFU and both open and robotic prostatectomy.We also discussed the advantages, " disadvantages, risks and benefits, as well as complications of each option. Regarding radiation therapy we discussed treatment planning, the different techniques, short and long term complications. These included radiation cystitis, radiation proctitis, and impotence. We discussed success, failure, and salvage therapeutic options.Also discussed the use of SpaceOAR for brachytherapy and EBRT and fiducials for EBRT.   We discussed surgical therapy in depth including preoperative preparation, surgical technique (including bladder neck and nerve-sparing techniques), postoperative recuperation and recovery, and short and long term complications including UTI, bleeding, blood clots,catheter dislodgement, etc. We discussed the risks of reoperation, incontinence, impotence, and recurrence. We discussed preop and postop Kegels, post op penile rehab, and treatment options for incontinence and impotence. We discussed rates of cancer free survival and recurrence, as well as salvage therapeutic options. We discussed the possible  indications for adjuvant radiation therapy.   I answered questions and addressed concerns. Also discussed the Prolaris test to get genetic information about this tumors potential.   Patient interested in RALP (robotic assisted laparoscopic prostatectomy).  My nurse will instruct the patient on Kegel exercises today and give them the Kegel exercise instruction sheet.   The patient will meet with our  Kacey today to find a date for surgery and begin preparation for surgery. Will also receive our handout on NPO guidelines and preop hydration. Patient will also receive information and education on preoperative skin preparation and postop wound care.     Solomon Rose

## 2023-08-04 ENCOUNTER — OFFICE VISIT (OUTPATIENT)
Dept: UROLOGY | Facility: CLINIC | Age: 73
End: 2023-08-04
Payer: MEDICARE

## 2023-08-04 ENCOUNTER — TELEPHONE (OUTPATIENT)
Dept: UROLOGY | Facility: CLINIC | Age: 73
End: 2023-08-04
Payer: MEDICARE

## 2023-08-04 VITALS
DIASTOLIC BLOOD PRESSURE: 78 MMHG | WEIGHT: 287.69 LBS | BODY MASS INDEX: 41.18 KG/M2 | HEIGHT: 70 IN | HEART RATE: 61 BPM | SYSTOLIC BLOOD PRESSURE: 117 MMHG

## 2023-08-04 DIAGNOSIS — E66.01 MORBID OBESITY: ICD-10-CM

## 2023-08-04 DIAGNOSIS — C61 PROSTATE CANCER: Primary | ICD-10-CM

## 2023-08-04 DIAGNOSIS — M35.3 PMR (POLYMYALGIA RHEUMATICA): ICD-10-CM

## 2023-08-04 PROCEDURE — 3074F PR MOST RECENT SYSTOLIC BLOOD PRESSURE < 130 MM HG: ICD-10-PCS | Mod: HCNC,CPTII,S$GLB, | Performed by: UROLOGY

## 2023-08-04 PROCEDURE — 3008F PR BODY MASS INDEX (BMI) DOCUMENTED: ICD-10-PCS | Mod: HCNC,CPTII,S$GLB, | Performed by: UROLOGY

## 2023-08-04 PROCEDURE — 1159F PR MEDICATION LIST DOCUMENTED IN MEDICAL RECORD: ICD-10-PCS | Mod: HCNC,CPTII,S$GLB, | Performed by: UROLOGY

## 2023-08-04 PROCEDURE — 3008F BODY MASS INDEX DOCD: CPT | Mod: HCNC,CPTII,S$GLB, | Performed by: UROLOGY

## 2023-08-04 PROCEDURE — 3288F PR FALLS RISK ASSESSMENT DOCUMENTED: ICD-10-PCS | Mod: HCNC,CPTII,S$GLB, | Performed by: UROLOGY

## 2023-08-04 PROCEDURE — 1101F PR PT FALLS ASSESS DOC 0-1 FALLS W/OUT INJ PAST YR: ICD-10-PCS | Mod: HCNC,CPTII,S$GLB, | Performed by: UROLOGY

## 2023-08-04 PROCEDURE — 4010F ACE/ARB THERAPY RXD/TAKEN: CPT | Mod: HCNC,CPTII,S$GLB, | Performed by: UROLOGY

## 2023-08-04 PROCEDURE — 3078F PR MOST RECENT DIASTOLIC BLOOD PRESSURE < 80 MM HG: ICD-10-PCS | Mod: HCNC,CPTII,S$GLB, | Performed by: UROLOGY

## 2023-08-04 PROCEDURE — 1159F MED LIST DOCD IN RCRD: CPT | Mod: HCNC,CPTII,S$GLB, | Performed by: UROLOGY

## 2023-08-04 PROCEDURE — 3078F DIAST BP <80 MM HG: CPT | Mod: HCNC,CPTII,S$GLB, | Performed by: UROLOGY

## 2023-08-04 PROCEDURE — 3074F SYST BP LT 130 MM HG: CPT | Mod: HCNC,CPTII,S$GLB, | Performed by: UROLOGY

## 2023-08-04 PROCEDURE — 99999 PR PBB SHADOW E&M-EST. PATIENT-LVL III: CPT | Mod: PBBFAC,HCNC,, | Performed by: UROLOGY

## 2023-08-04 PROCEDURE — 1101F PT FALLS ASSESS-DOCD LE1/YR: CPT | Mod: HCNC,CPTII,S$GLB, | Performed by: UROLOGY

## 2023-08-04 PROCEDURE — 99215 PR OFFICE/OUTPT VISIT, EST, LEVL V, 40-54 MIN: ICD-10-PCS | Mod: HCNC,S$GLB,, | Performed by: UROLOGY

## 2023-08-04 PROCEDURE — 99999 PR PBB SHADOW E&M-EST. PATIENT-LVL III: ICD-10-PCS | Mod: PBBFAC,HCNC,, | Performed by: UROLOGY

## 2023-08-04 PROCEDURE — 3288F FALL RISK ASSESSMENT DOCD: CPT | Mod: HCNC,CPTII,S$GLB, | Performed by: UROLOGY

## 2023-08-04 PROCEDURE — 99215 OFFICE O/P EST HI 40 MIN: CPT | Mod: HCNC,S$GLB,, | Performed by: UROLOGY

## 2023-08-04 PROCEDURE — 4010F PR ACE/ARB THEARPY RXD/TAKEN: ICD-10-PCS | Mod: HCNC,CPTII,S$GLB, | Performed by: UROLOGY

## 2023-08-04 RX ORDER — PREDNISONE 10 MG/1
10 TABLET ORAL DAILY
Qty: 30 TABLET | Refills: 0 | Status: SHIPPED | OUTPATIENT
Start: 2023-08-04 | End: 2023-09-03

## 2023-08-04 NOTE — LETTER
August 4, 2023        [place]  [address] [place] [address] [place] [address]             0Replaced by [facility]  [address] [place] [address] DR YOUNG SAAEVDRA [address]  Phone: [phone]  Fax: [phone]   Patient: Juan Shepherd   MR Number: [id number]   YOB: 1950   Date of Visit: 8/4/2023       Dear Dr. Bernard:    Thank you for referring Juan Shepherd to me for evaluation. Attached you will find relevant portions of my assessment and plan of care.    If you have questions, please do not hesitate to call me. I look forward to following Juan Shepherd along with you.    Sincerely,      Solomon Rose MD            CC    No Recipients    Enclosure

## 2023-08-07 ENCOUNTER — TELEPHONE (OUTPATIENT)
Dept: PREADMISSION TESTING | Facility: HOSPITAL | Age: 73
End: 2023-08-07
Payer: MEDICARE

## 2023-08-07 NOTE — ANESTHESIA PAT ROS NOTE
08/07/2023  Juan Shepherd is a 72 y.o., male.      Pre-op Assessment          Review of Systems           Anesthesia Assessment: Preoperative EQUATION    Planned Procedure: Procedure(s) (LRB):  XI ROBOTIC PROSTATECTOMY (N/A)  LYMPHADENECTOMY, PELVIS (Bilateral)  Requested Anesthesia Type:General/Regional  Surgeon: Solomon Rose MD  Service: Urology  Known or anticipated Date of Surgery:8/21/2023    Surgeon notes: reviewed    Previous anesthesia records:Not available    Last PCP note: within 3 months , within [facility]   Subspecialty notes: Radiology/ONC, Urology    Other important co-morbidities: HTN, Morbid Obesity, and Arthritis, Reflux       Tests already available:  Available tests,  within 3 months , within [facility] .  7/7/2023 CMP, CBC      Optimization:  Anesthesia Preop Clinic Assessment  Indicated.    Medical Opinion Indicated.           Plan:    Testing:  EKG and T&S   Pre-anesthesia  visit       Visit focus: concerns in complex and/or prolonged anesthesia, position other than supine     Consultation:IM Perioperative Hospitalist     Patient  has previously scheduled Medical Appointment: N/A    Navigation: Tests Scheduled.              Consults scheduled.             Results will be tracked by Preop Clinic.

## 2023-08-09 ENCOUNTER — HOSPITAL ENCOUNTER (OUTPATIENT)
Dept: RADIOLOGY | Facility: HOSPITAL | Age: 73
Discharge: HOME | End: 2023-08-09
Attending: UROLOGY
Payer: MEDICARE

## 2023-08-09 DIAGNOSIS — M25.50 MULTIPLE JOINT PAIN: ICD-10-CM

## 2023-08-09 DIAGNOSIS — C61 PROSTATE CANCER: ICD-10-CM

## 2023-08-09 PROCEDURE — 78306 NM BONE SCAN WHOLE BODY: ICD-10-PCS | Mod: 26,HCNC,, | Performed by: RADIOLOGY

## 2023-08-09 PROCEDURE — 78306 BONE IMAGING WHOLE BODY: CPT | Mod: 26,HCNC,, | Performed by: RADIOLOGY

## 2023-08-09 PROCEDURE — 78306 BONE IMAGING WHOLE BODY: CPT | Mod: TC,HCNC

## 2023-08-09 RX ORDER — MELOXICAM 15 MG/1
15 TABLET ORAL
Qty: 30 TABLET | Refills: 0 | Status: SHIPPED | OUTPATIENT
Start: 2023-08-09 | End: 2023-08-16

## 2023-08-15 ENCOUNTER — LAB VISIT (OUTPATIENT)
Dept: LAB | Facility: HOSPITAL | Age: 73
End: 2023-08-15
Attending: NURSE PRACTITIONER
Payer: MEDICARE

## 2023-08-15 DIAGNOSIS — M35.3 PMR (POLYMYALGIA RHEUMATICA): ICD-10-CM

## 2023-08-15 PROBLEM — K21.9 GERD (GASTROESOPHAGEAL REFLUX DISEASE): Status: ACTIVE | Noted: 2023-08-15

## 2023-08-15 LAB
CRP SERPL-MCNC: 6.7 MG/L (ref 0–8.2)
ERYTHROCYTE [SEDIMENTATION RATE] IN BLOOD BY PHOTOMETRIC METHOD: 30 MM/HR (ref 0–23)

## 2023-08-15 PROCEDURE — 36415 COLL VENOUS BLD VENIPUNCTURE: CPT | Mod: HCNC,PO | Performed by: NURSE PRACTITIONER

## 2023-08-15 PROCEDURE — 86140 C-REACTIVE PROTEIN: CPT | Mod: HCNC,PO | Performed by: NURSE PRACTITIONER

## 2023-08-15 PROCEDURE — 85652 RBC SED RATE AUTOMATED: CPT | Mod: HCNC | Performed by: NURSE PRACTITIONER

## 2023-08-15 NOTE — ASSESSMENT & PLAN NOTE
Encouraged weight loss, healthy diet (DASH/Mediterranean) and exercise.   Patient should exercise 30 minutes at least five times weekly. Limit alcohol.  Has lost weight- dieting

## 2023-08-15 NOTE — PROGRESS NOTES
Urology ([facility]) H&P for upcoming procedure  Staff:  Solomon Rose MD    CC: prostate adencarcinoma    HPI:  Juan Shepherd is a 72 y.o. male with [id number] [place] 4+3=7 prostate adenocarcinoma.      TRUS biopsy revealed the following:       LEFT   RIGHT  BASE   3+3   4+3  MID   3+3   3+4  APEX   3+4   -    Co-morbidities - morbid obesity, HTN, polymyalgia rheumatica.  Lives in [place], retired from [employer]. Here with his wife and daughter.  Has lost 30 pounds on diet since January.  PSH: lap liz, lap umbilical repair.     FH -negative  PSA - 4.6  AJCC Stage - T1C  STAR CAP stage- IIC  Volume - 49 ccs  MRI - 4/13/2023-(1.5T) 0.8 RAPZ PI-RADS 4,MRI negative for EPE (extraprostatic extension), NVBI (neurovascular bundle involvement), SVI (seminal vesicle involvement), or nodes. To my review lesion extends from apex to right mid gland with the largest component at left mid. Borderline PI-RADS 5 with coronal length (1.5 cm).  Biopsy - 5/30/2023- [place] 4+3 (GG3) right base 2/2 5%; [place] 3+4 right middle 2/2 40%, left apex 2/2 15%; [place] 3+3 left base 2/2 5%, left middle 2/2 10%. Overall 5/6 sites, 10/12 cores.  SHAMIR Score - 5 intermediate  NCCN - unfavorable intermediate  Germline testing - not indicated  Somatic testing - discussed  STAR CAP-      Voiding complaints: present  ED: absent  Incontinence: absent    ROS:  Neg except per HPI, specifically no bone pain, no unintentional weight loss, no anorexia, no night sweats    Past Medical History:   Diagnosis Date    Arthritis     Cataract     Cough 1/30/2018    Duodenitis     EGD 8/19/2016 (see outside procedure 10/27/2016 under [facility])    Elevated cholesterol     Ex-smoker     Gastritis     EGD 8/19/2016 (see outside procedure 10/27/2016 under [facility])    Hiatal hernia     EGD 8/19/2016 (see outside procedure 10/27/2016 under Media)    Hypertension     Morbid obesity     Pre-operative clearance 7/30/2018    Reflux esophagitis     EGD 8/19/2016 (see outside  procedure 10/27/2016 under [facility])    Tinnitus     and hL eval by ent diana       Past Surgical History:   Procedure Laterality Date    CHOLECYSTECTOMY      COLONOSCOPY      FINGER SURGERY      HAND SURGERY      HERNIA REPAIR      VASECTOMY         Social History     Socioeconomic History    Marital status:     Number of children: 2   Tobacco Use    Smoking status: Former     Current packs/day: 0.00     Average packs/day: 0.5 packs/day for 6.0 years (3.0 ttl pk-yrs)     Types: Cigarettes     Start date:      Quit date:      Years since quittin.6    Smokeless tobacco: Never    Tobacco comments:     light smoker quit over 36 y/   Substance and Sexual Activity    Alcohol use: Yes     Comment: occasionally    Drug use: No    Sexual activity: Yes     Partners: Female   Social History Narrative        Brother dee dee clark    Retired [employer]     Social Determinants of Health     Financial Resource Strain: Low Risk  (4/3/2023)    Overall Financial Resource Strain (CARDIA)     Difficulty of Paying Living Expenses: Not hard at all   Food Insecurity: No Food Insecurity (4/3/2023)    Hunger Vital Sign     Worried About Running Out of Food in the Last Year: Never true     Ran Out of Food in the Last Year: Never true   Transportation Needs: No Transportation Needs (4/3/2023)    PRAPARE - Transportation     Lack of Transportation (Medical): No     Lack of Transportation (Non-Medical): No   Physical Activity: Unknown (4/3/2023)    Exercise Vital Sign     Days of Exercise per Week: 0 days   Stress: No Stress Concern Present (4/3/2023)    [employer] [place] [employer]ational [employer] - Occupational Stress Questionnaire     Feeling of Stress : Not at all   Social Connections: Socially Integrated (4/3/2023)    Social Connection and Isolation Panel [NHANES]     Frequency of Communication with Friends and Family: More than three times a week     Frequency of Social Gatherings with Friends and Family: Once  a week     Attends [employer] Services: More than 4 times per year     Active Member of Clubs or Organizations: Yes     Attends Club or Organization Meetings: More than 4 times per year     Marital Status:    Housing Stability: Low Risk  (4/3/2023)    Housing Stability Vital Sign     Unable to Pay for Housing in the Last Year: No     Number of Places Lived in the Last Year: 1     Unstable Housing in the Last Year: No       Family History   Problem Relation Age of Onset    Glaucoma Mother     Pulmonary embolism Mother     Dementia Father     Anxiety disorder Father     Post-traumatic stress disorder Father     Restless legs syndrome Sister     Edema Sister     Polymyalgia rheumatica Brother     Clotting disorder Brother     Skin cancer Daughter     No Known Problems Son     No Known Problems Brother     No Known Problems Sister        Review of patient's allergies indicates:   Allergen Reactions    Sulfamethoxazole-trimethoprim Rash     Other reaction(s): Hives       Current Outpatient Medications on File Prior to Visit   Medication Sig Dispense Refill    amLODIPine (NORVASC) 10 MG tablet Take 1 tablet (10 mg total) by mouth once daily. 90 tablet 3    aspirin (ECOTRIN) 81 MG EC tablet Take 81 mg by mouth once daily.      esomeprazole (NEXIUM) 40 MG capsule Take 1 capsule (40 mg total) by mouth once daily. 90 capsule 3    lisinopriL-hydrochlorothiazide (PRINZIDE,ZESTORETIC) 20-12.5 mg per tablet Take 1 tablet by mouth once daily. 90 tablet 3    meloxicam (MOBIC) 15 MG tablet TAKE 1 TABLET(15 MG) BY MOUTH EVERY DAY 30 tablet 0    predniSONE (DELTASONE) 10 MG tablet Take 1 tablet (10 mg total) by mouth once daily. 30 tablet 0    simvastatin (ZOCOR) 40 MG tablet Take 1 tablet (40 mg total) by mouth every evening. 90 tablet 3     No current facility-administered medications on file prior to visit.       Anticoagulation:  No    Physical Exam:  Estimated body mass index is 41.28 kg/m² as calculated from the  "following:    Height as of 8/4/23: 5' 10" (1.778 m).    Weight as of 8/4/23: 130.5 kg (287 lb 11.2 oz).    Physical Exam  Constitutional:       General: He is not in acute distress.  Eyes:      General: No scleral icterus.  Cardiovascular:      Rate and Rhythm: Normal rate.   Pulmonary:      Effort: Pulmonary effort is normal. No respiratory distress.   Abdominal:      General: There is no distension.      Palpations: Abdomen is soft.      Tenderness: There is no abdominal tenderness.   Genitourinary:     Penis: Normal. No discharge.    Musculoskeletal:         General: No tenderness. Normal range of motion.   Skin:     General: Skin is warm and dry.      Findings: No rash.   Neurological:      Mental Status: He is alert and oriented to person, place, and time.   Psychiatric:         Mood and Affect: Mood and affect normal.         Judgment: Judgment normal.         Labs:  Urine dipstick today shows negative for all components.    Lab Results   Component Value Date    WBC 7.59 07/07/2023    HGB 13.5 (L) 07/07/2023    HCT 41.2 07/07/2023    MCV 93 07/07/2023     07/07/2023     Lab Results   Component Value Date     07/07/2023    K 4.6 07/07/2023     07/07/2023    CO2 26 07/07/2023    BUN 14 07/07/2023    CREATININE 0.9 07/07/2023    CALCIUM 9.7 07/07/2023    ANIONGAP 12 07/07/2023    EGFRNORACEVR >60.0 07/07/2023       Lab Results   Component Value Date    PSA 4.6 (H) 02/02/2023    PSA 3.9 05/27/2021    PSA 2.7 01/31/2020    PSA 2.2 01/31/2019    PSA 1.9 01/24/2018    PSA 2.2 12/08/2016    PSA 1.8 11/19/2015    PSA 1.6 12/03/2014    PSADIAG 4.1 (H) 03/21/2023       Imaging:  MRI - 4/13/2023-(1.5T) 0.8 RAPZ PI-RADS 4,MRI negative for EPE (extraprostatic extension), NVBI (neurovascular bundle involvement), SVI (seminal vesicle involvement), or nodes. To my review lesion extends from apex to right mid gland with the largest component at left mid. Borderline PI-RADS 5 with coronal length (1.5 " cm).    NM Bone Scan (8/3/2023) - No scintigraphic evidence of metastatic disease with degenerative uptake.    Assessment: Juan Shepherd is a 72 y.o. male with [id number] [place] 4+3=7 prostate adenocarcinoma.      Plan:   1. To OR on 8/21/23 for RALP with BPLND.  2. Surgical and blood consents signed.  3. The risks and benefits of surgical therapy were discussed in depth including preoperative preparation, surgical technique (including bladder neck and nerve-sparing techniques), postoperative recuperation and recovery, and short and long term complications. I discussed the risks of bleeding, infection, anastomotic urine leak, incontinence and impotence. I discussed the chance of rectal injury, obturator nerve injury, and occult injury to the bowel. We discussed preop and postop Kegels, post op penile rehab, and treatment options for incontinence and impotence.The patient voices understanding and all questions have been answered. The patient agrees to proceed as planned.  4. Pre-op clearance appointment states he is optimized.    Dennis Delacruz MD

## 2023-08-15 NOTE — ASSESSMENT & PLAN NOTE
Current BP  at goal today.    Taking: amlodipine/lisinopril-HCTZ    Lifestyle changes to reduce systolic BP:  exercise 30 minutes per day,  5 days per week or 150 minutes weekly; sodium reduction and avoidance of high salt foods such as processed meats, frozen meals and  fast foods.   Keeping a healthy weight/BMI can help with better BP control    BP acceptable for surgery. I recommend monitoring BP during perioperative period as uncontrolled pain can elevate blood pressure.

## 2023-08-15 NOTE — H&P (VIEW-ONLY)
Urology ([facility]) H&P for upcoming procedure  Staff:  Solomon Rose MD    CC: prostate adencarcinoma    HPI:  uJan Shepherd is a 72 y.o. male with [id number] [place] 4+3=7 prostate adenocarcinoma.      TRUS biopsy revealed the following:       LEFT   RIGHT  BASE   3+3   4+3  MID   3+3   3+4  APEX   3+4   -    Co-morbidities - morbid obesity, HTN, polymyalgia rheumatica.  Lives in [place], retired from [employer]. Here with his wife and daughter.  Has lost 30 pounds on diet since January.  PSH: lap liz, lap umbilical repair.     FH -negative  PSA - 4.6  AJCC Stage - T1C  STAR CAP stage- IIC  Volume - 49 ccs  MRI - 4/13/2023-(1.5T) 0.8 RAPZ PI-RADS 4,MRI negative for EPE (extraprostatic extension), NVBI (neurovascular bundle involvement), SVI (seminal vesicle involvement), or nodes. To my review lesion extends from apex to right mid gland with the largest component at left mid. Borderline PI-RADS 5 with coronal length (1.5 cm).  Biopsy - 5/30/2023- [place] 4+3 (GG3) right base 2/2 5%; [place] 3+4 right middle 2/2 40%, left apex 2/2 15%; Prem 3+3 left base 2/2 5%, left middle 2/2 10%. Overall 5/6 sites, 10/12 cores.  SHAMIR Score - 5 intermediate  NCCN - unfavorable intermediate  Germline testing - not indicated  Somatic testing - discussed  STAR CAP-      Voiding complaints: present  ED: absent  Incontinence: absent    ROS:  Neg except per HPI, specifically no bone pain, no unintentional weight loss, no anorexia, no night sweats    Past Medical History:   Diagnosis Date    Arthritis     Cataract     Cough 1/30/2018    Duodenitis     EGD 8/19/2016 (see outside procedure 10/27/2016 under Media)    Elevated cholesterol     Ex-smoker     Gastritis     EGD 8/19/2016 (see outside procedure 10/27/2016 under Media)    Hiatal hernia     EGD 8/19/2016 (see outside procedure 10/27/2016 under Media)    Hypertension     Morbid obesity     Pre-operative clearance 7/30/2018    Reflux esophagitis     EGD 8/19/2016 (see outside  procedure 10/27/2016 under Media)    Tinnitus     and hL eval by ent diana       Past Surgical History:   Procedure Laterality Date    CHOLECYSTECTOMY      COLONOSCOPY      FINGER SURGERY      HAND SURGERY      HERNIA REPAIR      VASECTOMY         Social History     Socioeconomic History    Marital status:     Number of children: 2   Tobacco Use    Smoking status: Former     Current packs/day: 0.00     Average packs/day: 0.5 packs/day for 6.0 years (3.0 ttl pk-yrs)     Types: Cigarettes     Start date:      Quit date:      Years since quittin.6    Smokeless tobacco: Never    Tobacco comments:     light smoker quit over 36 y/   Substance and Sexual Activity    Alcohol use: Yes     Comment: occasionally    Drug use: No    Sexual activity: Yes     Partners: Female   Social History Narrative        Brother dee dee clark    Retired [employer]     Social Determinants of Health     Financial Resource Strain: Low Risk  (4/3/2023)    Overall Financial Resource Strain (CARDIA)     Difficulty of Paying Living Expenses: Not hard at all   Food Insecurity: No Food Insecurity (4/3/2023)    Hunger Vital Sign     Worried About Running Out of Food in the Last Year: Never true     Ran Out of Food in the Last Year: Never true   Transportation Needs: No Transportation Needs (4/3/2023)    PRAPARE - Transportation     Lack of Transportation (Medical): No     Lack of Transportation (Non-Medical): No   Physical Activity: Unknown (4/3/2023)    Exercise Vital Sign     Days of Exercise per Week: 0 days   Stress: No Stress Concern Present (4/3/2023)    [employer] [place] [employer] - Occupational Stress Questionnaire     Feeling of Stress : Not at all   Social Connections: Socially Integrated (4/3/2023)    Social Connection and Isolation Panel [NHANES]     Frequency of Communication with Friends and Family: More than three times a week     Frequency of Social Gatherings with Friends and Family: Once  a week     Attends Confucianism Services: More than 4 times per year     Active Member of Clubs or Organizations: Yes     Attends Club or Organization Meetings: More than 4 times per year     Marital Status:    Housing Stability: Low Risk  (4/3/2023)    Housing Stability Vital Sign     Unable to Pay for Housing in the Last Year: No     Number of Places Lived in the Last Year: 1     Unstable Housing in the Last Year: No       Family History   Problem Relation Age of Onset    Glaucoma Mother     Pulmonary embolism Mother     Dementia Father     Anxiety disorder Father     Post-traumatic stress disorder Father     Restless legs syndrome Sister     Edema Sister     Polymyalgia rheumatica Brother     Clotting disorder Brother     Skin cancer Daughter     No Known Problems Son     No Known Problems Brother     No Known Problems Sister        Review of patient's allergies indicates:   Allergen Reactions    Sulfamethoxazole-trimethoprim Rash     Other reaction(s): Hives       Current Outpatient Medications on File Prior to Visit   Medication Sig Dispense Refill    amLODIPine (NORVASC) 10 MG tablet Take 1 tablet (10 mg total) by mouth once daily. 90 tablet 3    aspirin (ECOTRIN) 81 MG EC tablet Take 81 mg by mouth once daily.      esomeprazole (NEXIUM) 40 MG capsule Take 1 capsule (40 mg total) by mouth once daily. 90 capsule 3    lisinopriL-hydrochlorothiazide (PRINZIDE,ZESTORETIC) 20-12.5 mg per tablet Take 1 tablet by mouth once daily. 90 tablet 3    meloxicam (MOBIC) 15 MG tablet TAKE 1 TABLET(15 MG) BY MOUTH EVERY DAY 30 tablet 0    predniSONE (DELTASONE) 10 MG tablet Take 1 tablet (10 mg total) by mouth once daily. 30 tablet 0    simvastatin (ZOCOR) 40 MG tablet Take 1 tablet (40 mg total) by mouth every evening. 90 tablet 3     No current facility-administered medications on file prior to visit.       Anticoagulation:  No    Physical Exam:  Estimated body mass index is 41.28 kg/m² as calculated from the  "following:    Height as of 8/4/23: 5' 10" (1.778 m).    Weight as of 8/4/23: 130.5 kg (287 lb 11.2 oz).    Physical Exam  Constitutional:       General: He is not in acute distress.  Eyes:      General: No scleral icterus.  Cardiovascular:      Rate and Rhythm: Normal rate.   Pulmonary:      Effort: Pulmonary effort is normal. No respiratory distress.   Abdominal:      General: There is no distension.      Palpations: Abdomen is soft.      Tenderness: There is no abdominal tenderness.   Genitourinary:     Penis: Normal. No discharge.    Musculoskeletal:         General: No tenderness. Normal range of motion.   Skin:     General: Skin is warm and dry.      Findings: No rash.   Neurological:      Mental Status: He is alert and oriented to person, place, and time.   Psychiatric:         Mood and Affect: Mood and affect normal.         Judgment: Judgment normal.         Labs:  Urine dipstick today shows negative for all components.    Lab Results   Component Value Date    WBC 7.59 07/07/2023    HGB 13.5 (L) 07/07/2023    HCT 41.2 07/07/2023    MCV 93 07/07/2023     07/07/2023     Lab Results   Component Value Date     07/07/2023    K 4.6 07/07/2023     07/07/2023    CO2 26 07/07/2023    BUN 14 07/07/2023    CREATININE 0.9 07/07/2023    CALCIUM 9.7 07/07/2023    ANIONGAP 12 07/07/2023    EGFRNORACEVR >60.0 07/07/2023       Lab Results   Component Value Date    PSA 4.6 (H) 02/02/2023    PSA 3.9 05/27/2021    PSA 2.7 01/31/2020    PSA 2.2 01/31/2019    PSA 1.9 01/24/2018    PSA 2.2 12/08/2016    PSA 1.8 11/19/2015    PSA 1.6 12/03/2014    PSADIAG 4.1 (H) 03/21/2023       Imaging:  MRI - 4/13/2023-(1.5T) 0.8 RAPZ PI-RADS 4,MRI negative for EPE (extraprostatic extension), NVBI (neurovascular bundle involvement), SVI (seminal vesicle involvement), or nodes. To my review lesion extends from apex to right mid gland with the largest component at left mid. Borderline PI-RADS 5 with coronal length (1.5 " cm).    NM Bone Scan (8/3/2023) - No scintigraphic evidence of metastatic disease with degenerative uptake.    Assessment: Juan Shepherd is a 72 y.o. male with [id number] [place] 4+3=7 prostate adenocarcinoma.      Plan:   1. To OR on 8/21/23 for RALP with BPLND.  2. Surgical and blood consents signed.  3. The risks and benefits of surgical therapy were discussed in depth including preoperative preparation, surgical technique (including bladder neck and nerve-sparing techniques), postoperative recuperation and recovery, and short and long term complications. I discussed the risks of bleeding, infection, anastomotic urine leak, incontinence and impotence. I discussed the chance of rectal injury, obturator nerve injury, and occult injury to the bowel. We discussed preop and postop Kegels, post op penile rehab, and treatment options for incontinence and impotence.The patient voices understanding and all questions have been answered. The patient agrees to proceed as planned.  4. Pre-op clearance appointment states he is optimized.    Dennis Delacruz MD

## 2023-08-15 NOTE — ASSESSMENT & PLAN NOTE
Patient would benefit from weight loss and has set goals to achieve success by walking daily and eating a healthy diet.

## 2023-08-16 ENCOUNTER — HOSPITAL ENCOUNTER (OUTPATIENT)
Dept: CARDIOLOGY | Facility: CLINIC | Age: 73
Discharge: HOME | End: 2023-08-16
Payer: MEDICARE

## 2023-08-16 ENCOUNTER — OFFICE VISIT (OUTPATIENT)
Dept: INTERNAL MEDICINE | Facility: CLINIC | Age: 73
End: 2023-08-16
Payer: MEDICARE

## 2023-08-16 ENCOUNTER — OFFICE VISIT (OUTPATIENT)
Dept: UROLOGY | Facility: CLINIC | Age: 73
End: 2023-08-16
Payer: MEDICARE

## 2023-08-16 ENCOUNTER — LAB VISIT (OUTPATIENT)
Dept: LAB | Facility: HOSPITAL | Age: 73
End: 2023-08-16
Attending: ANESTHESIOLOGY
Payer: MEDICARE

## 2023-08-16 VITALS
SYSTOLIC BLOOD PRESSURE: 125 MMHG | WEIGHT: 276 LBS | TEMPERATURE: 99 F | HEART RATE: 64 BPM | HEIGHT: 70 IN | OXYGEN SATURATION: 96 % | DIASTOLIC BLOOD PRESSURE: 68 MMHG | BODY MASS INDEX: 39.51 KG/M2

## 2023-08-16 DIAGNOSIS — D64.9 NORMOCYTIC ANEMIA: ICD-10-CM

## 2023-08-16 DIAGNOSIS — R60.0 BILATERAL LEG EDEMA: ICD-10-CM

## 2023-08-16 DIAGNOSIS — C61 PROSTATE CANCER: Primary | ICD-10-CM

## 2023-08-16 DIAGNOSIS — M35.3 PMR (POLYMYALGIA RHEUMATICA): ICD-10-CM

## 2023-08-16 DIAGNOSIS — Z01.818 PREOPERATIVE EXAMINATION: Primary | ICD-10-CM

## 2023-08-16 DIAGNOSIS — K21.9 GASTROESOPHAGEAL REFLUX DISEASE, UNSPECIFIED WHETHER ESOPHAGITIS PRESENT: ICD-10-CM

## 2023-08-16 DIAGNOSIS — E66.01 MORBID OBESITY: ICD-10-CM

## 2023-08-16 DIAGNOSIS — I10 ESSENTIAL HYPERTENSION: ICD-10-CM

## 2023-08-16 DIAGNOSIS — Z01.818 PREOPERATIVE TESTING: ICD-10-CM

## 2023-08-16 DIAGNOSIS — E78.2 MIXED HYPERLIPIDEMIA: ICD-10-CM

## 2023-08-16 DIAGNOSIS — Z79.52 CURRENT CHRONIC USE OF SYSTEMIC STEROIDS: ICD-10-CM

## 2023-08-16 DIAGNOSIS — C61 PROSTATE CANCER: ICD-10-CM

## 2023-08-16 LAB
ABO + RH BLD: NORMAL
BLD GP AB SCN CELLS X3 SERPL QL: NORMAL
SPECIMEN OUTDATE: NORMAL

## 2023-08-16 PROCEDURE — 3078F DIAST BP <80 MM HG: CPT | Mod: HCNC,CPTII,S$GLB, | Performed by: NURSE PRACTITIONER

## 2023-08-16 PROCEDURE — 1125F AMNT PAIN NOTED PAIN PRSNT: CPT | Mod: HCNC,CPTII,S$GLB, | Performed by: NURSE PRACTITIONER

## 2023-08-16 PROCEDURE — 93010 ELECTROCARDIOGRAM REPORT: CPT | Mod: HCNC,S$GLB,, | Performed by: INTERNAL MEDICINE

## 2023-08-16 PROCEDURE — 99499 NO LOS: ICD-10-PCS | Mod: HCNC,S$GLB,, | Performed by: UROLOGY

## 2023-08-16 PROCEDURE — 3078F PR MOST RECENT DIASTOLIC BLOOD PRESSURE < 80 MM HG: ICD-10-PCS | Mod: HCNC,CPTII,S$GLB, | Performed by: NURSE PRACTITIONER

## 2023-08-16 PROCEDURE — 99999 PR PBB SHADOW E&M-EST. PATIENT-LVL III: CPT | Mod: PBBFAC,HCNC,, | Performed by: NURSE PRACTITIONER

## 2023-08-16 PROCEDURE — 93010 EKG 12-LEAD: ICD-10-PCS | Mod: HCNC,S$GLB,, | Performed by: INTERNAL MEDICINE

## 2023-08-16 PROCEDURE — 93005 EKG 12-LEAD: ICD-10-PCS | Mod: HCNC,S$GLB,, | Performed by: ANESTHESIOLOGY

## 2023-08-16 PROCEDURE — 3008F PR BODY MASS INDEX (BMI) DOCUMENTED: ICD-10-PCS | Mod: HCNC,CPTII,S$GLB, | Performed by: NURSE PRACTITIONER

## 2023-08-16 PROCEDURE — 36415 COLL VENOUS BLD VENIPUNCTURE: CPT | Mod: HCNC | Performed by: ANESTHESIOLOGY

## 2023-08-16 PROCEDURE — 99999 PR PBB SHADOW E&M-EST. PATIENT-LVL III: ICD-10-PCS | Mod: PBBFAC,HCNC,, | Performed by: NURSE PRACTITIONER

## 2023-08-16 PROCEDURE — 1159F PR MEDICATION LIST DOCUMENTED IN MEDICAL RECORD: ICD-10-PCS | Mod: HCNC,CPTII,S$GLB, | Performed by: NURSE PRACTITIONER

## 2023-08-16 PROCEDURE — 4010F PR ACE/ARB THEARPY RXD/TAKEN: ICD-10-PCS | Mod: HCNC,CPTII,S$GLB, | Performed by: NURSE PRACTITIONER

## 2023-08-16 PROCEDURE — 1125F PR PAIN SEVERITY QUANTIFIED, PAIN PRESENT: ICD-10-PCS | Mod: HCNC,CPTII,S$GLB, | Performed by: NURSE PRACTITIONER

## 2023-08-16 PROCEDURE — 3074F SYST BP LT 130 MM HG: CPT | Mod: HCNC,CPTII,S$GLB, | Performed by: NURSE PRACTITIONER

## 2023-08-16 PROCEDURE — 1159F MED LIST DOCD IN RCRD: CPT | Mod: HCNC,CPTII,S$GLB, | Performed by: NURSE PRACTITIONER

## 2023-08-16 PROCEDURE — 4010F ACE/ARB THERAPY RXD/TAKEN: CPT | Mod: HCNC,CPTII,S$GLB, | Performed by: NURSE PRACTITIONER

## 2023-08-16 PROCEDURE — 3074F PR MOST RECENT SYSTOLIC BLOOD PRESSURE < 130 MM HG: ICD-10-PCS | Mod: HCNC,CPTII,S$GLB, | Performed by: NURSE PRACTITIONER

## 2023-08-16 PROCEDURE — 93005 ELECTROCARDIOGRAM TRACING: CPT | Mod: HCNC,S$GLB,, | Performed by: ANESTHESIOLOGY

## 2023-08-16 PROCEDURE — 99215 OFFICE O/P EST HI 40 MIN: CPT | Mod: HCNC,S$GLB,, | Performed by: NURSE PRACTITIONER

## 2023-08-16 PROCEDURE — 99499 UNLISTED E&M SERVICE: CPT | Mod: HCNC,S$GLB,, | Performed by: UROLOGY

## 2023-08-16 PROCEDURE — 86900 BLOOD TYPING SEROLOGIC ABO: CPT | Mod: HCNC | Performed by: ANESTHESIOLOGY

## 2023-08-16 PROCEDURE — 99215 PR OFFICE/OUTPT VISIT, EST, LEVL V, 40-54 MIN: ICD-10-PCS | Mod: HCNC,S$GLB,, | Performed by: NURSE PRACTITIONER

## 2023-08-16 PROCEDURE — 3008F BODY MASS INDEX DOCD: CPT | Mod: HCNC,CPTII,S$GLB, | Performed by: NURSE PRACTITIONER

## 2023-08-16 NOTE — PROGRESS NOTES
[facility]  Progress Note    Patient Name: Juan Shepherd  MRN: [id number]  Date of Evaluation- 08/17/2023  PCP- Soo Bernard NP    Future cases for Juan Shepherd [[id number]]       Case ID Status Date Time Dru Procedure Provider Location    [id number] [facility] 8/21/2023  7:00  XI ROBOTIC PROSTATECTOMY Solomon Rose MD [[id number]] [facility] OR 2ND FLR            HPI:  This is a 72 y.o. male  who presents today with his brother for a preoperative evaluation in preparation for robotic prostatectomy and pelvis lymphadenectomy.  Surgery is indicated for  prostate cancer .   Patient is new to me.  The history has been obtained by speaking with the patient and reviewing the electronic medical record and/or outside health information. Significant health conditions for the perioperative period are discussed below in assessment and plan.   Patient reports current health status to be Good.  Denies any new symptoms before surgery.        Subjective/ Objective:     Chief Complaint: Preoperative evaulation, perioperative medical management, and complication reduction plan.     Functional Capacity: walks 20 mins. daily without CP/SOB.       Anesthesia issues: None    Difficulty mouth opening: No    Steroid use in the last 12 months:  currently on Prednisone    Dental Issues: None    Family anesthesia difficulty: None       Family Hx of Thrombosis: Mother - PE     Past Medical History:   Diagnosis Date    Arthritis     Cataract     Cough 01/30/2018    Duodenitis     EGD 8/19/2016 (see outside procedure 10/27/2016 under Media)    Elevated cholesterol     Ex-smoker     Gastritis     EGD 8/19/2016 (see outside procedure 10/27/2016 under Media)    GERD (gastroesophageal reflux disease)     Hiatal hernia     EGD 8/19/2016 (see outside procedure 10/27/2016 under Media)    Hypertension     Morbid obesity     Pre-operative clearance 07/30/2018    Reflux esophagitis     EGD 8/19/2016 (see outside procedure 10/27/2016 under Media)     Tinnitus     and hL eval by ent diana         Past Medical History Pertinent Negatives:   Diagnosis Date Noted    Amblyopia 05/16/2017    Anemia 08/16/2023    Anxiety 08/16/2023    Asthma 08/16/2023    COPD (chronic obstructive pulmonary disease) 08/16/2023    Coronary artery disease 08/16/2023    Deep vein thrombosis 08/16/2023    Depression 08/16/2023    Diabetes mellitus 05/16/2017    Diabetes mellitus, type 2 08/16/2023    Diabetic retinopathy 05/16/2017    Disorder of kidney and ureter 08/16/2023    Glaucoma 05/16/2017    Macular degeneration 05/22/2018    Pulmonary embolism 08/16/2023    Retinal detachment 05/16/2017    Seizures 08/16/2023    Sickle cell anemia 05/16/2017    Sickle cell trait 05/16/2017    Strabismus 05/16/2017    Stroke 08/16/2023    Thyroid disease 08/16/2023    Uveitis 05/16/2017         Past Surgical History:   Procedure Laterality Date    CHOLECYSTECTOMY      COLONOSCOPY      FINGER SURGERY      HAND SURGERY      HERNIA REPAIR      VASECTOMY         Review of Systems   Constitutional:  Negative for chills, fatigue, fever and unexpected weight change.   HENT:  Positive for congestion (not bothered by it; not currently treated), hearing loss (left ear) and tinnitus. Negative for rhinorrhea, sore throat and trouble swallowing.    Eyes:  Negative for visual disturbance.   Respiratory:  Negative for cough, chest tightness, shortness of breath and wheezing.    Cardiovascular:  Negative for chest pain, palpitations and leg swelling.   Gastrointestinal:  Negative for constipation and diarrhea.        Denies Fatty liver, Hepatitis   Genitourinary:  Positive for frequency. Negative for decreased urine volume, difficulty urinating, dysuria, hematuria and urgency.   Musculoskeletal:  Positive for back pain. Negative for arthralgias, neck pain and neck stiffness.   Skin:  Negative for rash and wound.   Neurological:  Negative for dizziness, syncope, weakness, numbness and headaches.  "  Hematological:  Bruises/bleeds easily.   Psychiatric/Behavioral:  Negative for sleep disturbance and suicidal ideas.               VITALS  Visit Vitals  /68 (BP Location: Left arm, Patient Position: Sitting)   Pulse 64   Temp 98.6 °F (37 °C) (Oral)   Ht 5' 10" (1.778 m)   Wt 125.2 kg (276 lb)   SpO2 96%   BMI 39.60 kg/m²          Physical Exam  Vitals reviewed.   Constitutional:       General: He is not in acute distress.     Appearance: He is well-developed. He is obese.   HENT:      Head: Normocephalic.      Nose: Nose normal.      Mouth/Throat:      Pharynx: No oropharyngeal exudate.   Eyes:      General:         Right eye: No discharge.         Left eye: No discharge.      Conjunctiva/sclera: Conjunctivae normal.      Pupils: Pupils are equal, round, and reactive to light.   Neck:      Thyroid: No thyromegaly.      Vascular: No carotid bruit or JVD.      Trachea: No tracheal deviation.   Cardiovascular:      Rate and Rhythm: Normal rate and regular rhythm.      Pulses:           Carotid pulses are 2+ on the right side and 2+ on the left side.       Dorsalis pedis pulses are 2+ on the right side and 2+ on the left side.        Posterior tibial pulses are 2+ on the right side and 2+ on the left side.      Heart sounds: Normal heart sounds. No murmur heard.  Pulmonary:      Effort: Pulmonary effort is normal. No respiratory distress.      Breath sounds: Normal breath sounds. No stridor. No wheezing, rhonchi or rales.   Abdominal:      General: Bowel sounds are normal. There is no distension.      Palpations: Abdomen is soft.      Tenderness: There is no abdominal tenderness. There is no guarding.   Musculoskeletal:      Cervical back: Normal range of motion. No pain with movement.      Right lower le+ Pitting Edema present.      Left lower le+ Pitting Edema present.   Lymphadenopathy:      Cervical: No cervical adenopathy.   Skin:     General: Skin is warm and dry.      Capillary Refill: Capillary " refill takes less than 2 seconds.      Findings: No erythema or rash.   Neurological:      Mental Status: He is alert and oriented to person, place, and time.      Motor: Weakness (left hand) present.   Psychiatric:         Behavior: Behavior normal.          Significant Labs:  Lab Results   Component Value Date    WBC 7.59 07/07/2023    HGB 13.5 (L) 07/07/2023    HCT 41.2 07/07/2023     07/07/2023    CHOL 143 02/02/2023    TRIG 73 02/02/2023    HDL 46 02/02/2023    ALT 23 07/07/2023    AST 18 07/07/2023     07/07/2023    K 4.6 07/07/2023     07/07/2023    CREATININE 0.9 07/07/2023    BUN 14 07/07/2023    CO2 26 07/07/2023    TSH 2.546 01/24/2017    PSA 4.6 (H) 02/02/2023       Diagnostic Studies: No relevant studies.    EKG:   Results for orders placed or performed during the hospital encounter of 08/16/23   EKG 12-lead    Collection Time: 08/16/23  9:49 AM    Narrative    Test Reason : Z01.818,    Vent. Rate : 060 BPM     Atrial Rate : 060 BPM     P-R Int : 156 ms          QRS Dur : 088 ms      QT Int : 434 ms       P-R-T Axes : 000 -32 050 degrees     QTc Int : 434 ms    Normal sinus rhythm  Left axis deviation  Abnormal ECG  When compared with ECG of 21-AUG-2020 09:04,  No significant change was found  Confirmed by Salty Coello MD ([id number]) on 8/16/2023 12:50:43 PM    Referred By: RAMÓN OCHOA           Confirmed By:Salty Coello MD         Active Cardiac Conditions: None      Revised Cardiac Risk Index   High -Risk Surgery  Intraperitoneal; Intrathoracic; suprainguinal vascular Yes- + 1 No- 0   History of Ischemic Heart Disease   (Hx of MI/positive exercise test/current chest pain due to ischemia/use of nitrate therapy/EKG with pathological Q waves) Yes- + 1 No- 0   History of CHF  (Pulmonary edema/bilateral rales or S3 gallop/PND/CXR showing pulmonary vascular redistribution) Yes- + 1 No- 0   History of CVA   (Prior stroke or TIA) Yes- + 1 No- 0   Pre-operative treatment with insulin Yes- + 1  No- 0   Pre-operative creatinine > 2mg/dl Yes- + 1 No- 0   Total: 0      Risk Status:  Estimated risk of cardiac complications after non-cardiac surgery using the Revised Cardiac Risk Index for Preoperative risk is 3.9 %      ARISCAT ([place]) risk index: Intermediate: 13.3% risk of post-op pulmonary complications.    American Society of Anesthesiologists Physical Status classification (ASA): 2           No further cardiac workup needed prior to surgery.                   Assessment/Plan:     Mixed hyperlipidemia  Encouraged weight loss, healthy diet (DASH/Mediterranean) and exercise.   Patient should exercise 30 minutes at least five times weekly. Limit alcohol.  Has lost weight- dieting      Essential hypertension  Current BP  at goal today.    Taking: amlodipine/lisinopril-HCTZ    Lifestyle changes to reduce systolic BP:  exercise 30 minutes per day,  5 days per week or 150 minutes weekly; sodium reduction and avoidance of high salt foods such as processed meats, frozen meals and  fast foods.   Keeping a healthy weight/BMI can help with better BP control    BP acceptable for surgery. I recommend monitoring BP during perioperative period as uncontrolled pain can elevate blood pressure.           Prostate cancer  Scheduled with Dr. Rose on 8/21/23 for robotic prostatectomy.     Morbid obesity  Patient would benefit from weight loss and has set goals to achieve success by walking daily and eating a healthy diet.         GERD (gastroesophageal reflux disease)  Currently being treated with Nexium; controlled.           Bilateral leg edema  I suggest avoidance of added salt and voidance of NSAID's- unless advised or ordered. I recommend limb elevation and niall hose use during perioperative period.    PMR (polymyalgia rheumatica)  Treated with Prednisone 10 mg daily  ESR:  elevated at 30 - improving  CRP:  normal at 6.7  Rheumatology appt. in September 2023    Current chronic use of systemic steroids  Currently taking  Prednisone for PMR, please monitor the patient for signs/symptoms of adrenal insufficiency.    Normocytic anemia  Mild  Current labs:  Hgb- 13.5     Hct- 41.2     Recommend monitoring during perioperative period.       Discussion/Management of Perioperative Care    Thromboembolic prophylaxis (VTE) Care: Risk factors for thrombosis include: age, obesity, and surgical procedure.  I recommend prophylaxis of thromboembolism with the use of compression stockings/pneumatic devices, and/or pharmacologic agents. The benefits should outweigh the risks for pharmacologic prophylaxis in the perioperative period. I also encourage early ambulation if not contraindicated during the post-operative period.    Risk factors for post-operative pulmonary complications include:age > 65 years, HTN, and surgery > 3 hours. To reduce the risk of pulmonary complications, prophylactic recommendations include: incentive spirometry use/deep breathing, end tidal carbon dioxide monitoring, and pain control.    Risk factors for renal complications include: age and HTN. To reduce the risk of postoperative renal complications, I recommend the patient maintain adequate fluid volume status by drinking 2 liters of water daily.  Avoid/reduce NSAIDS and PRATER-2 inhibitors use as well as IV contrast for renal protection.    I recommend the use of appropriate prophylactic antibiotics to reduce the risk of surgical site infections.    Delirium risk factors include advanced age. I recommend to avoid/reduce use of benzodiazepine use (not for patients who take on a regular basis), anticholinergics, Benadryl,  and agents that may cause postoperative serotonin syndrome.  Controlled pain can decrease the risk for postop delirium and since opioids are used for postoperative pain control, I suggest using the lowest dose for the shortest amount of time necessary for pain management.     The patient is at an increased risk for urinary retention due to : urological  procedure and advanced age. I recommend to avoid/decrease the use of benzodiazepines, anticholinergics, and Benadryl in the perioperative period. I also recommend using opioids for the shortest period of time if possible.          This visit was focused on Preoperative evaluation, Perioperative Medical management, complication reduction plans. I suggest that the patient follows up with primary care or relevant sub specialists for ongoing health care.    I appreciate the opportunity to be involved in this patients care. Please feel free to contact me if there were any questions about this consultation.        I spent a total of 40 minutes on the day of the visit.This includes face to face time and non-face to face time preparing to see the patient (e.g., review of tests), obtaining and/or reviewing separately obtained history, documenting clinical information in the electronic or other health record, independently interpreting results and communicating results to the patient/family/caregiver, or care coordinator.       Patient is optimized for surgery.      Tray Sheikh, TIFFANIE  Perioperative Medicine  [facility]

## 2023-08-16 NOTE — ASSESSMENT & PLAN NOTE
Currently taking Prednisone for PMR, please monitor the patient for signs/symptoms of adrenal insufficiency.

## 2023-08-16 NOTE — DISCHARGE INSTRUCTIONS
Your surgery has been scheduled for:______8/21/23____________________________________    You should report to:  ____[facility], located on the [place] side of the first floor of the           [facility] ([phone])  __X__[facility], located on the [facility] side of the            Second floor of the [facility] ([phone])  ____3rd Floor [facility] located on the [facility] side of the [facility] [phone]  ____[facility]: located at [address] [place] [address]. [address].     Please Note   Tell your doctor if you take Aspirin, products containing Aspirin, herbal medications  or blood thinners, such as Coumadin, Ticlid, or Plavix.  (Consult your provider regarding holding or stopping before surgery).  Arrange for someone to drive you home following surgery.  You will not be allowed to leave the surgical facility alone or drive yourself home following sedation and anesthesia.    Before Surgery  Stop taking all herbal medications, vitamins, and supplements 7 days prior to surgery  No Motrin/Advil (Ibuprofen) 7 days before surgery  No Aleve (Naproxen) 7 days before surgery  Stop Taking Asprin, products containing Aspirin __7___days before surgery   No Goody's/BC Powder 7 days before surgery  Refrain from drinking alcoholic beverages for 24 hours before and after surgery  Stop or limit smoking at least 24 hours prior to surgery  You may take Tylenol for pain    Night before Surgery  Do not eat or drink after midnight  Take a shower or bath (shower is recommended).  Bathe with Hibiclens soap or an antibacterial soap from the neck down.  If not supplied by your surgeon, hibiclens soap will need to be purchased over the counter in pharmacy.  Rinse soap off thoroughly.  Shampoo your hair with your regular shampoo    The Day of Surgery  Take another bath or shower with hibiclens  or any antibacterial soap, to reduce the chance of infection.  Take heart and blood pressure medications with a small sip of water, as advised by the perioperative team.  Do not take fluid pills  You may brush your teeth and rinse your mouth, but do not swallow any additional water.   Do not apply perfumes, powder, body lotions or deodorant on the day of surgery.  Nail polish should be removed.  Do not wear makeup or moisturizer  Wear comfortable clothes, such as a button front shirt and loose fitting pants.  Leave all jewelry, including body piercings, and valuables at home.    Bring any devices you will neeed after surgery such as crutches or canes.  If you have sleep apnea, please bring your CPAP machine  In the event that your physical condition changes including the onset of a cold or respiratory illness, or if you have to delay or cancel your surgery, please notify your surgeon.

## 2023-08-16 NOTE — ASSESSMENT & PLAN NOTE
I suggest avoidance of added salt and voidance of NSAID's- unless advised or ordered. I recommend limb elevation and niall hose use during perioperative period.

## 2023-08-16 NOTE — OUTPATIENT SUBJECTIVE & OBJECTIVE
Outpatient Subjective & Objective      Chief Complaint: Preoperative evaulation, perioperative medical management, and complication reduction plan.     Functional Capacity: walks 20 mins. daily without CP/SOB.       Anesthesia issues: None    Difficulty mouth opening: No    Steroid use in the last 12 months:  currently on Prednisone    Dental Issues: None    Family anesthesia difficulty: None       Family Hx of Thrombosis: Mother - PE     Past Medical History:   Diagnosis Date    Arthritis     Cataract     Cough 01/30/2018    Duodenitis     EGD 8/19/2016 (see outside procedure 10/27/2016 under [facility])    Elevated cholesterol     Ex-smoker     Gastritis     EGD 8/19/2016 (see outside procedure 10/27/2016 under [facility])    GERD (gastroesophageal reflux disease)     Hiatal hernia     EGD 8/19/2016 (see outside procedure 10/27/2016 under [facility])    Hypertension     Morbid obesity     Pre-operative clearance 07/30/2018    Reflux esophagitis     EGD 8/19/2016 (see outside procedure 10/27/2016 under [facility])    Tinnitus     and hL eval by ent diana         Past Medical History Pertinent Negatives:   Diagnosis Date Noted    Amblyopia 05/16/2017    Anemia 08/16/2023    Anxiety 08/16/2023    Asthma 08/16/2023    COPD (chronic obstructive pulmonary disease) 08/16/2023    Coronary artery disease 08/16/2023    Deep vein thrombosis 08/16/2023    Depression 08/16/2023    Diabetes mellitus 05/16/2017    Diabetes mellitus, type 2 08/16/2023    Diabetic retinopathy 05/16/2017    Disorder of kidney and ureter 08/16/2023    Glaucoma 05/16/2017    Macular degeneration 05/22/2018    Pulmonary embolism 08/16/2023    Retinal detachment 05/16/2017    Seizures 08/16/2023    Sickle cell anemia 05/16/2017    Sickle cell trait 05/16/2017    Strabismus 05/16/2017    Stroke 08/16/2023    Thyroid disease 08/16/2023    Uveitis 05/16/2017         Past Surgical History:   Procedure Laterality Date    CHOLECYSTECTOMY      COLONOSCOPY      FINGER SURGERY    "   HAND SURGERY      HERNIA REPAIR      VASECTOMY         Review of Systems   Constitutional:  Negative for chills, fatigue, fever and unexpected weight change.   HENT:  Positive for congestion (not bothered by it; not currently treated), hearing loss (left ear) and tinnitus. Negative for rhinorrhea, sore throat and trouble swallowing.    Eyes:  Negative for visual disturbance.   Respiratory:  Negative for cough, chest tightness, shortness of breath and wheezing.    Cardiovascular:  Negative for chest pain, palpitations and leg swelling.   Gastrointestinal:  Negative for constipation and diarrhea.        Denies Fatty liver, Hepatitis   Genitourinary:  Positive for frequency. Negative for decreased urine volume, difficulty urinating, dysuria, hematuria and urgency.   Musculoskeletal:  Positive for back pain. Negative for arthralgias, neck pain and neck stiffness.   Skin:  Negative for rash and wound.   Neurological:  Negative for dizziness, syncope, weakness, numbness and headaches.   Hematological:  Bruises/bleeds easily.   Psychiatric/Behavioral:  Negative for sleep disturbance and suicidal ideas.               VITALS  Visit Vitals  /68 (BP Location: Left arm, Patient Position: Sitting)   Pulse 64   Temp 98.6 °F (37 °C) (Oral)   Ht 5' 10" (1.778 m)   Wt 125.2 kg (276 lb)   SpO2 96%   BMI 39.60 kg/m²          Physical Exam  Vitals reviewed.   Constitutional:       General: He is not in acute distress.     Appearance: He is well-developed. He is obese.   HENT:      Head: Normocephalic.      Nose: Nose normal.      Mouth/Throat:      Pharynx: No oropharyngeal exudate.   Eyes:      General:         Right eye: No discharge.         Left eye: No discharge.      Conjunctiva/sclera: Conjunctivae normal.      Pupils: Pupils are equal, round, and reactive to light.   Neck:      Thyroid: No thyromegaly.      Vascular: No carotid bruit or JVD.      Trachea: No tracheal deviation.   Cardiovascular:      Rate and Rhythm: " Normal rate and regular rhythm.      Pulses:           Carotid pulses are 2+ on the right side and 2+ on the left side.       Dorsalis pedis pulses are 2+ on the right side and 2+ on the left side.        Posterior tibial pulses are 2+ on the right side and 2+ on the left side.      Heart sounds: Normal heart sounds. No murmur heard.  Pulmonary:      Effort: Pulmonary effort is normal. No respiratory distress.      Breath sounds: Normal breath sounds. No stridor. No wheezing, rhonchi or rales.   Abdominal:      General: Bowel sounds are normal. There is no distension.      Palpations: Abdomen is soft.      Tenderness: There is no abdominal tenderness. There is no guarding.   Musculoskeletal:      Cervical back: Normal range of motion. No pain with movement.      Right lower le+ Pitting Edema present.      Left lower le+ Pitting Edema present.   Lymphadenopathy:      Cervical: No cervical adenopathy.   Skin:     General: Skin is warm and dry.      Capillary Refill: Capillary refill takes less than 2 seconds.      Findings: No erythema or rash.   Neurological:      Mental Status: He is alert and oriented to person, place, and time.      Motor: Weakness (left hand) present.   Psychiatric:         Behavior: Behavior normal.          Significant Labs:  Lab Results   Component Value Date    WBC 7.59 2023    HGB 13.5 (L) 2023    HCT 41.2 2023     2023    CHOL 143 2023    TRIG 73 2023    HDL 46 2023    ALT 23 2023    AST 18 2023     2023    K 4.6 2023     2023    CREATININE 0.9 2023    BUN 14 2023    CO2 26 2023    TSH 2.546 2017    PSA 4.6 (H) 2023       Diagnostic Studies: No relevant studies.    EKG:   Results for orders placed or performed during the hospital encounter of 23   EKG 12-lead    Collection Time: 23  9:49 AM    Narrative    Test Reason : Z01.818,    Vent. Rate : 060 BPM      Atrial Rate : 060 BPM     P-R Int : 156 ms          QRS Dur : 088 ms      QT Int : 434 ms       P-R-T Axes : 000 -32 050 degrees     QTc Int : 434 ms    Normal sinus rhythm  Left axis deviation  Abnormal ECG  When compared with ECG of 21-AUG-2020 09:04,  No significant change was found  Confirmed by Salty Coello MD ([id number]) on 8/16/2023 12:50:43 PM    Referred By: RAMÓN OCHOA           Confirmed By:Salty Coello MD         Active Cardiac Conditions: None      Revised Cardiac Risk Index   High -Risk Surgery  Intraperitoneal; Intrathoracic; suprainguinal vascular Yes- + 1 No- 0   History of Ischemic Heart Disease   (Hx of MI/positive exercise test/current chest pain due to ischemia/use of nitrate therapy/EKG with pathological Q waves) Yes- + 1 No- 0   History of CHF  (Pulmonary edema/bilateral rales or S3 gallop/PND/CXR showing pulmonary vascular redistribution) Yes- + 1 No- 0   History of CVA   (Prior stroke or TIA) Yes- + 1 No- 0   Pre-operative treatment with insulin Yes- + 1 No- 0   Pre-operative creatinine > 2mg/dl Yes- + 1 No- 0   Total: 0      Risk Status:  Estimated risk of cardiac complications after non-cardiac surgery using the Revised Cardiac Risk Index for Preoperative risk is 3.9 %      ARISCAT (Canet) risk index: Intermediate: 13.3% risk of post-op pulmonary complications.    American Society of Anesthesiologists Physical Status classification (ASA): 2           No further cardiac workup needed prior to surgery.    Outpatient Subjective & Objective

## 2023-08-16 NOTE — HPI
This is a 72 y.o. male  who presents today with his brother for a preoperative evaluation in preparation for robotic prostatectomy and pelvis lymphadenectomy.  Surgery is indicated for  prostate cancer .   Patient is new to me.  The history has been obtained by speaking with the patient and reviewing the electronic medical record and/or outside health information. Significant health conditions for the perioperative period are discussed below in assessment and plan.   Patient reports current health status to be Good.  Denies any new symptoms before surgery.

## 2023-08-16 NOTE — ASSESSMENT & PLAN NOTE
Treated with Prednisone 10 mg daily  ESR:  elevated at 30 - improving  CRP:  normal at 6.7  Rheumatology appt. in September 2023

## 2023-08-16 NOTE — ASSESSMENT & PLAN NOTE
Mild  Current labs:  Hgb- 13.5     Hct- 41.2     Recommend monitoring during perioperative period.

## 2023-08-20 ENCOUNTER — TELEPHONE (OUTPATIENT)
Dept: UROLOGY | Facility: CLINIC | Age: 73
End: 2023-08-20
Payer: MEDICARE

## 2023-08-20 ENCOUNTER — ANESTHESIA EVENT (OUTPATIENT)
Dept: SURGERY | Facility: HOSPITAL | Age: 73
DRG: 707 | End: 2023-08-20
Payer: MEDICARE

## 2023-08-20 NOTE — TELEPHONE ENCOUNTER
I Spoke to Juan Shepherd  Arrival time of 0500 given to check in at [facility] the 2nd Floor of the [facility] on [facility].  Instructed to:  the day before surgery,not to drink or eat anything after midnight except for a 12 oz Gatorade 2 hours prior to arrival time.To do 2 fleets enemas the afternoon before surgery, to bathe the night before and in the morning of with Hibiclens, not to wear anything metal or to apply any lotions, powders, or deodorant, .  Only one person can accompany you the morning of surgery.  No one will be allowed to stay over night and visiting hours are from 8am to 6pm. Patient verified understanding of instructions.

## 2023-08-21 ENCOUNTER — HOSPITAL ENCOUNTER (INPATIENT)
Facility: HOSPITAL | Age: 73
LOS: 1 days | Discharge: HOME | DRG: 707 | End: 2023-08-22
Attending: UROLOGY | Admitting: UROLOGY
Payer: MEDICARE

## 2023-08-21 ENCOUNTER — ANESTHESIA (OUTPATIENT)
Dept: SURGERY | Facility: HOSPITAL | Age: 73
DRG: 707 | End: 2023-08-21
Payer: MEDICARE

## 2023-08-21 DIAGNOSIS — Z01.818 PREOPERATIVE TESTING: ICD-10-CM

## 2023-08-21 DIAGNOSIS — C61 PROSTATE CANCER: Primary | ICD-10-CM

## 2023-08-21 PROBLEM — E66.01 SEVERE OBESITY (BMI >= 40): Status: ACTIVE | Noted: 2023-08-21

## 2023-08-21 LAB
CREAT SERPL-MCNC: 0.8 MG/DL (ref 0.5–1.4)
EST. GFR  (NO RACE VARIABLE): >60 ML/MIN/1.73 M^2

## 2023-08-21 PROCEDURE — 71000033 HC RECOVERY, INTIAL HOUR: Mod: HCNC | Performed by: UROLOGY

## 2023-08-21 PROCEDURE — 25000003 PHARM REV CODE 250: Mod: HCNC | Performed by: NURSE ANESTHETIST, CERTIFIED REGISTERED

## 2023-08-21 PROCEDURE — 63600175 PHARM REV CODE 636 W HCPCS: Mod: HCNC | Performed by: STUDENT IN AN ORGANIZED HEALTH CARE EDUCATION/TRAINING PROGRAM

## 2023-08-21 PROCEDURE — 64999 UNLISTED PX NERVOUS SYSTEM: CPT | Mod: HCNC,,, | Performed by: SURGERY

## 2023-08-21 PROCEDURE — 88305 TISSUE EXAM BY PATHOLOGIST: ICD-10-PCS | Mod: 26,HCNC,, | Performed by: PATHOLOGY

## 2023-08-21 PROCEDURE — 25000003 PHARM REV CODE 250: Mod: HCNC | Performed by: STUDENT IN AN ORGANIZED HEALTH CARE EDUCATION/TRAINING PROGRAM

## 2023-08-21 PROCEDURE — 88305 TISSUE EXAM BY PATHOLOGIST: CPT | Mod: 26,HCNC,, | Performed by: PATHOLOGY

## 2023-08-21 PROCEDURE — 88305 TISSUE EXAM BY PATHOLOGIST: CPT | Mod: HCNC | Performed by: PATHOLOGY

## 2023-08-21 PROCEDURE — 76942 ECHO GUIDE FOR BIOPSY: CPT | Mod: 26,HCNC,, | Performed by: SURGERY

## 2023-08-21 PROCEDURE — 71000015 HC POSTOP RECOV 1ST HR: Mod: HCNC | Performed by: UROLOGY

## 2023-08-21 PROCEDURE — 88344 IMHCHEM/IMCYTCHM EA MLT ANTB: CPT | Mod: 26,HCNC,, | Performed by: PATHOLOGY

## 2023-08-21 PROCEDURE — 64999 BILATERAL ESP SINGLE SHOT: ICD-10-PCS | Mod: HCNC,,, | Performed by: SURGERY

## 2023-08-21 PROCEDURE — 76942 ECHO GUIDE FOR BIOPSY: CPT | Mod: HCNC

## 2023-08-21 PROCEDURE — 27201423 OPTIME MED/SURG SUP & DEVICES STERILE SUPPLY: Mod: HCNC | Performed by: UROLOGY

## 2023-08-21 PROCEDURE — D9220A PRA ANESTHESIA: Mod: HCNC,CRNA,, | Performed by: NURSE ANESTHETIST, CERTIFIED REGISTERED

## 2023-08-21 PROCEDURE — 55866 PR LAP,PROSTATECTOMY,RADICAL,W/NERVE SPARE,INCL ROBOTIC: ICD-10-PCS | Mod: ,,, | Performed by: UROLOGY

## 2023-08-21 PROCEDURE — 63600175 PHARM REV CODE 636 W HCPCS: Mod: HCNC | Performed by: SURGERY

## 2023-08-21 PROCEDURE — 88309 TISSUE EXAM BY PATHOLOGIST: CPT | Mod: 26,HCNC,, | Performed by: PATHOLOGY

## 2023-08-21 PROCEDURE — 11000001 HC ACUTE MED/SURG PRIVATE ROOM: Mod: HCNC

## 2023-08-21 PROCEDURE — 55866 LAPS SURG PRST8ECT RPBIC RAD: CPT | Mod: ,,, | Performed by: UROLOGY

## 2023-08-21 PROCEDURE — 38571 PR LAP,PELVIC LYMPHADENECTOMY: ICD-10-PCS | Mod: 51,,, | Performed by: UROLOGY

## 2023-08-21 PROCEDURE — 63600175 PHARM REV CODE 636 W HCPCS: Mod: HCNC | Performed by: NURSE ANESTHETIST, CERTIFIED REGISTERED

## 2023-08-21 PROCEDURE — 71000039 HC RECOVERY, EACH ADD'L HOUR: Mod: HCNC | Performed by: UROLOGY

## 2023-08-21 PROCEDURE — 38571 LAPAROSCOPY LYMPHADENECTOMY: CPT | Mod: 51,,, | Performed by: UROLOGY

## 2023-08-21 PROCEDURE — D9220A PRA ANESTHESIA: Mod: HCNC,ANES,, | Performed by: ANESTHESIOLOGY

## 2023-08-21 PROCEDURE — 88309 TISSUE EXAM BY PATHOLOGIST: CPT | Mod: HCNC | Performed by: PATHOLOGY

## 2023-08-21 PROCEDURE — 88309 PR  SURG PATH,LEVEL VI: ICD-10-PCS | Mod: 26,HCNC,, | Performed by: PATHOLOGY

## 2023-08-21 PROCEDURE — 88307 PR  SURG PATH,LEVEL V: ICD-10-PCS | Mod: 26,HCNC,, | Performed by: PATHOLOGY

## 2023-08-21 PROCEDURE — 82565 ASSAY OF CREATININE: CPT | Mod: HCNC | Performed by: UROLOGY

## 2023-08-21 PROCEDURE — 37000008 HC ANESTHESIA 1ST 15 MINUTES: Mod: HCNC | Performed by: UROLOGY

## 2023-08-21 PROCEDURE — 27000221 HC OXYGEN, UP TO 24 HOURS: Mod: HCNC

## 2023-08-21 PROCEDURE — 88344 PR IHC OR ICC EACH MULTIPLEX ANTIBODY STAIN PROCEDURE: ICD-10-PCS | Mod: 26,HCNC,, | Performed by: PATHOLOGY

## 2023-08-21 PROCEDURE — D9220A PRA ANESTHESIA: ICD-10-PCS | Mod: HCNC,ANES,, | Performed by: ANESTHESIOLOGY

## 2023-08-21 PROCEDURE — 94761 N-INVAS EAR/PLS OXIMETRY MLT: CPT | Mod: HCNC

## 2023-08-21 PROCEDURE — 36000712 HC OR TIME LEV V 1ST 15 MIN: Mod: HCNC | Performed by: UROLOGY

## 2023-08-21 PROCEDURE — 71000016 HC POSTOP RECOV ADDL HR: Mod: HCNC | Performed by: UROLOGY

## 2023-08-21 PROCEDURE — 76942 PR U/S GUIDANCE FOR NEEDLE GUIDANCE: ICD-10-PCS | Mod: 26,HCNC,, | Performed by: SURGERY

## 2023-08-21 PROCEDURE — 88307 TISSUE EXAM BY PATHOLOGIST: CPT | Mod: 26,HCNC,, | Performed by: PATHOLOGY

## 2023-08-21 PROCEDURE — 37000009 HC ANESTHESIA EA ADD 15 MINS: Mod: HCNC | Performed by: UROLOGY

## 2023-08-21 PROCEDURE — 63600175 PHARM REV CODE 636 W HCPCS: Mod: HCNC | Performed by: ANESTHESIOLOGY

## 2023-08-21 PROCEDURE — 36000713 HC OR TIME LEV V EA ADD 15 MIN: Mod: HCNC | Performed by: UROLOGY

## 2023-08-21 PROCEDURE — 88307 TISSUE EXAM BY PATHOLOGIST: CPT | Mod: HCNC | Performed by: PATHOLOGY

## 2023-08-21 PROCEDURE — D9220A PRA ANESTHESIA: ICD-10-PCS | Mod: HCNC,CRNA,, | Performed by: NURSE ANESTHETIST, CERTIFIED REGISTERED

## 2023-08-21 PROCEDURE — 88305 TISSUE EXAM BY PATHOLOGIST: CPT | Mod: 59,HCNC | Performed by: PATHOLOGY

## 2023-08-21 RX ORDER — DEXAMETHASONE SODIUM PHOSPHATE 4 MG/ML
INJECTION, SOLUTION INTRA-ARTICULAR; INTRALESIONAL; INTRAMUSCULAR; INTRAVENOUS; SOFT TISSUE
Status: DISCONTINUED | OUTPATIENT
Start: 2023-08-21 | End: 2023-08-21

## 2023-08-21 RX ORDER — SODIUM CHLORIDE 9 MG/ML
INJECTION, SOLUTION INTRAVENOUS CONTINUOUS
Status: DISCONTINUED | OUTPATIENT
Start: 2023-08-21 | End: 2023-08-22 | Stop reason: HOSPADM

## 2023-08-21 RX ORDER — LIDOCAINE HYDROCHLORIDE 20 MG/ML
INJECTION INTRAVENOUS
Status: DISCONTINUED | OUTPATIENT
Start: 2023-08-21 | End: 2023-08-21

## 2023-08-21 RX ORDER — SODIUM CHLORIDE 9 MG/ML
INJECTION, SOLUTION INTRAVENOUS ONCE
Status: DISCONTINUED | OUTPATIENT
Start: 2023-08-21 | End: 2023-08-21 | Stop reason: HOSPADM

## 2023-08-21 RX ORDER — PHENYLEPHRINE HYDROCHLORIDE 10 MG/ML
INJECTION INTRAVENOUS
Status: DISCONTINUED | OUTPATIENT
Start: 2023-08-21 | End: 2023-08-21

## 2023-08-21 RX ORDER — LISINOPRIL AND HYDROCHLOROTHIAZIDE 10; 12.5 MG/1; MG/1
1 TABLET ORAL DAILY
Status: DISCONTINUED | OUTPATIENT
Start: 2023-08-21 | End: 2023-08-22 | Stop reason: HOSPADM

## 2023-08-21 RX ORDER — SODIUM CHLORIDE 0.9 % (FLUSH) 0.9 %
10 SYRINGE (ML) INJECTION
Status: DISCONTINUED | OUTPATIENT
Start: 2023-08-21 | End: 2023-08-21 | Stop reason: HOSPADM

## 2023-08-21 RX ORDER — DIPHENHYDRAMINE HYDROCHLORIDE 50 MG/ML
12.5 INJECTION INTRAMUSCULAR; INTRAVENOUS EVERY 4 HOURS PRN
Status: DISCONTINUED | OUTPATIENT
Start: 2023-08-21 | End: 2023-08-22 | Stop reason: HOSPADM

## 2023-08-21 RX ORDER — FENTANYL CITRATE 50 UG/ML
INJECTION, SOLUTION INTRAMUSCULAR; INTRAVENOUS
Status: DISCONTINUED | OUTPATIENT
Start: 2023-08-21 | End: 2023-08-21

## 2023-08-21 RX ORDER — AMLODIPINE BESYLATE 5 MG/1
10 TABLET ORAL DAILY
Status: DISCONTINUED | OUTPATIENT
Start: 2023-08-21 | End: 2023-08-22 | Stop reason: HOSPADM

## 2023-08-21 RX ORDER — PREDNISONE 5 MG/1
10 TABLET ORAL DAILY
Status: DISCONTINUED | OUTPATIENT
Start: 2023-08-21 | End: 2023-08-22 | Stop reason: HOSPADM

## 2023-08-21 RX ORDER — POLYETHYLENE GLYCOL 3350 17 G/17G
17 POWDER, FOR SOLUTION ORAL DAILY
Status: DISCONTINUED | OUTPATIENT
Start: 2023-08-21 | End: 2023-08-22 | Stop reason: HOSPADM

## 2023-08-21 RX ORDER — HEPARIN SODIUM 5000 [USP'U]/ML
5000 INJECTION, SOLUTION INTRAVENOUS; SUBCUTANEOUS EVERY 8 HOURS
Status: DISCONTINUED | OUTPATIENT
Start: 2023-08-21 | End: 2023-08-22 | Stop reason: HOSPADM

## 2023-08-21 RX ORDER — ROCURONIUM BROMIDE 10 MG/ML
INJECTION, SOLUTION INTRAVENOUS
Status: DISCONTINUED | OUTPATIENT
Start: 2023-08-21 | End: 2023-08-21

## 2023-08-21 RX ORDER — OXYCODONE HYDROCHLORIDE 5 MG/1
5 TABLET ORAL EVERY 4 HOURS PRN
Status: DISCONTINUED | OUTPATIENT
Start: 2023-08-21 | End: 2023-08-22 | Stop reason: HOSPADM

## 2023-08-21 RX ORDER — OXYBUTYNIN CHLORIDE 5 MG/1
5 TABLET ORAL 3 TIMES DAILY PRN
Status: DISCONTINUED | OUTPATIENT
Start: 2023-08-21 | End: 2023-08-22 | Stop reason: HOSPADM

## 2023-08-21 RX ORDER — MIDAZOLAM HYDROCHLORIDE 1 MG/ML
.5-4 INJECTION INTRAMUSCULAR; INTRAVENOUS
Status: DISCONTINUED | OUTPATIENT
Start: 2023-08-21 | End: 2023-08-21 | Stop reason: HOSPADM

## 2023-08-21 RX ORDER — ONDANSETRON 2 MG/ML
4 INJECTION INTRAMUSCULAR; INTRAVENOUS EVERY 8 HOURS PRN
Status: DISCONTINUED | OUTPATIENT
Start: 2023-08-21 | End: 2023-08-22 | Stop reason: HOSPADM

## 2023-08-21 RX ORDER — PANTOPRAZOLE SODIUM 40 MG/1
40 TABLET, DELAYED RELEASE ORAL DAILY
Status: DISCONTINUED | OUTPATIENT
Start: 2023-08-21 | End: 2023-08-22 | Stop reason: HOSPADM

## 2023-08-21 RX ORDER — HYDROMORPHONE HYDROCHLORIDE 1 MG/ML
0.2 INJECTION, SOLUTION INTRAMUSCULAR; INTRAVENOUS; SUBCUTANEOUS EVERY 5 MIN PRN
Status: DISCONTINUED | OUTPATIENT
Start: 2023-08-21 | End: 2023-08-21 | Stop reason: HOSPADM

## 2023-08-21 RX ORDER — ACETAMINOPHEN 500 MG
1000 TABLET ORAL EVERY 6 HOURS
Status: DISCONTINUED | OUTPATIENT
Start: 2023-08-21 | End: 2023-08-22 | Stop reason: HOSPADM

## 2023-08-21 RX ORDER — PREGABALIN 75 MG/1
150 CAPSULE ORAL
Status: COMPLETED | OUTPATIENT
Start: 2023-08-21 | End: 2023-08-21

## 2023-08-21 RX ORDER — LIDOCAINE HYDROCHLORIDE 10 MG/ML
1 INJECTION, SOLUTION EPIDURAL; INFILTRATION; INTRACAUDAL; PERINEURAL ONCE
Status: COMPLETED | OUTPATIENT
Start: 2023-08-21 | End: 2023-08-21

## 2023-08-21 RX ORDER — HALOPERIDOL 5 MG/ML
0.5 INJECTION INTRAMUSCULAR EVERY 10 MIN PRN
Status: DISCONTINUED | OUTPATIENT
Start: 2023-08-21 | End: 2023-08-21 | Stop reason: HOSPADM

## 2023-08-21 RX ORDER — EPHEDRINE SULFATE 50 MG/ML
INJECTION, SOLUTION INTRAVENOUS
Status: DISCONTINUED | OUTPATIENT
Start: 2023-08-21 | End: 2023-08-21

## 2023-08-21 RX ORDER — BUPIVACAINE HYDROCHLORIDE 7.5 MG/ML
INJECTION, SOLUTION EPIDURAL; RETROBULBAR
Status: COMPLETED | OUTPATIENT
Start: 2023-08-21 | End: 2023-08-21

## 2023-08-21 RX ORDER — ACETAMINOPHEN 10 MG/ML
1000 INJECTION, SOLUTION INTRAVENOUS ONCE
Status: COMPLETED | OUTPATIENT
Start: 2023-08-21 | End: 2023-08-21

## 2023-08-21 RX ORDER — TALC
6 POWDER (GRAM) TOPICAL NIGHTLY PRN
Status: DISCONTINUED | OUTPATIENT
Start: 2023-08-21 | End: 2023-08-22 | Stop reason: HOSPADM

## 2023-08-21 RX ORDER — ATORVASTATIN CALCIUM 20 MG/1
20 TABLET, FILM COATED ORAL DAILY
Status: DISCONTINUED | OUTPATIENT
Start: 2023-08-21 | End: 2023-08-22 | Stop reason: HOSPADM

## 2023-08-21 RX ORDER — CEFAZOLIN SODIUM 1 G/3ML
INJECTION, POWDER, FOR SOLUTION INTRAMUSCULAR; INTRAVENOUS
Status: DISCONTINUED | OUTPATIENT
Start: 2023-08-21 | End: 2023-08-21

## 2023-08-21 RX ORDER — KETAMINE HCL IN 0.9 % NACL 50 MG/5 ML
SYRINGE (ML) INTRAVENOUS
Status: DISCONTINUED | OUTPATIENT
Start: 2023-08-21 | End: 2023-08-21

## 2023-08-21 RX ORDER — PREGABALIN 50 MG/1
150 CAPSULE ORAL NIGHTLY
Status: DISCONTINUED | OUTPATIENT
Start: 2023-08-21 | End: 2023-08-22 | Stop reason: HOSPADM

## 2023-08-21 RX ORDER — METHOCARBAMOL 500 MG/1
1000 TABLET, FILM COATED ORAL 4 TIMES DAILY
Status: DISCONTINUED | OUTPATIENT
Start: 2023-08-21 | End: 2023-08-22 | Stop reason: HOSPADM

## 2023-08-21 RX ORDER — ACETAMINOPHEN 500 MG
1000 TABLET ORAL
Status: COMPLETED | OUTPATIENT
Start: 2023-08-21 | End: 2023-08-21

## 2023-08-21 RX ORDER — ONDANSETRON 2 MG/ML
INJECTION INTRAMUSCULAR; INTRAVENOUS
Status: DISCONTINUED | OUTPATIENT
Start: 2023-08-21 | End: 2023-08-21

## 2023-08-21 RX ORDER — LIDOCAINE HYDROCHLORIDE ANHYDROUS AND DEXTROSE MONOHYDRATE .8; 5 G/100ML; G/100ML
INJECTION, SOLUTION INTRAVENOUS CONTINUOUS PRN
Status: DISCONTINUED | OUTPATIENT
Start: 2023-08-21 | End: 2023-08-21

## 2023-08-21 RX ORDER — KETOROLAC TROMETHAMINE 15 MG/ML
15 INJECTION, SOLUTION INTRAMUSCULAR; INTRAVENOUS
Status: COMPLETED | OUTPATIENT
Start: 2023-08-21 | End: 2023-08-21

## 2023-08-21 RX ORDER — PROPOFOL 10 MG/ML
VIAL (ML) INTRAVENOUS
Status: DISCONTINUED | OUTPATIENT
Start: 2023-08-21 | End: 2023-08-21

## 2023-08-21 RX ORDER — FENTANYL CITRATE 50 UG/ML
25-200 INJECTION, SOLUTION INTRAMUSCULAR; INTRAVENOUS
Status: DISCONTINUED | OUTPATIENT
Start: 2023-08-21 | End: 2023-08-21 | Stop reason: HOSPADM

## 2023-08-21 RX ORDER — DEXMEDETOMIDINE HYDROCHLORIDE 100 UG/ML
INJECTION, SOLUTION INTRAVENOUS
Status: DISCONTINUED | OUTPATIENT
Start: 2023-08-21 | End: 2023-08-21

## 2023-08-21 RX ORDER — HEPARIN SODIUM 5000 [USP'U]/ML
5000 INJECTION, SOLUTION INTRAVENOUS; SUBCUTANEOUS EVERY 8 HOURS
Status: DISCONTINUED | OUTPATIENT
Start: 2023-08-21 | End: 2023-08-21

## 2023-08-21 RX ORDER — KETOROLAC TROMETHAMINE 30 MG/ML
15 INJECTION, SOLUTION INTRAMUSCULAR; INTRAVENOUS EVERY 6 HOURS PRN
Status: DISCONTINUED | OUTPATIENT
Start: 2023-08-21 | End: 2023-08-22 | Stop reason: HOSPADM

## 2023-08-21 RX ADMIN — LIDOCAINE HYDROCHLORIDE 1 MG: 10 INJECTION, SOLUTION EPIDURAL; INFILTRATION; INTRACAUDAL; PERINEURAL at 06:08

## 2023-08-21 RX ADMIN — FENTANYL CITRATE 100 MCG: 50 INJECTION, SOLUTION INTRAMUSCULAR; INTRAVENOUS at 06:08

## 2023-08-21 RX ADMIN — SODIUM CHLORIDE, SODIUM ACETATE ANHYDROUS, SODIUM GLUCONATE, POTASSIUM CHLORIDE, AND MAGNESIUM CHLORIDE: 526; 222; 502; 37; 30 INJECTION, SOLUTION INTRAVENOUS at 07:08

## 2023-08-21 RX ADMIN — DEXMEDETOMIDINE 12 MCG: 100 INJECTION, SOLUTION, CONCENTRATE INTRAVENOUS at 11:08

## 2023-08-21 RX ADMIN — POLYETHYLENE GLYCOL 3350 17 G: 17 POWDER, FOR SOLUTION ORAL at 02:08

## 2023-08-21 RX ADMIN — DEXAMETHASONE SODIUM PHOSPHATE 4 MG: 4 INJECTION, SOLUTION INTRAMUSCULAR; INTRAVENOUS at 07:08

## 2023-08-21 RX ADMIN — CEFAZOLIN 3 G: 330 INJECTION, POWDER, FOR SOLUTION INTRAMUSCULAR; INTRAVENOUS at 11:08

## 2023-08-21 RX ADMIN — Medication 30 MG: at 07:08

## 2023-08-21 RX ADMIN — PREGABALIN 150 MG: 50 CAPSULE ORAL at 11:08

## 2023-08-21 RX ADMIN — ACETAMINOPHEN 1000 MG: 500 TABLET ORAL at 05:08

## 2023-08-21 RX ADMIN — HEPARIN SODIUM 5000 UNITS: 5000 INJECTION INTRAVENOUS; SUBCUTANEOUS at 11:08

## 2023-08-21 RX ADMIN — BUPIVACAINE HYDROCHLORIDE 30 ML: 7.5 INJECTION, SOLUTION EPIDURAL; RETROBULBAR at 06:08

## 2023-08-21 RX ADMIN — KETOROLAC TROMETHAMINE 15 MG: 15 INJECTION, SOLUTION INTRAMUSCULAR; INTRAVENOUS at 10:08

## 2023-08-21 RX ADMIN — EPHEDRINE SULFATE 10 MG: 50 INJECTION INTRAVENOUS at 07:08

## 2023-08-21 RX ADMIN — PANTOPRAZOLE SODIUM 40 MG: 40 TABLET, DELAYED RELEASE ORAL at 12:08

## 2023-08-21 RX ADMIN — EPHEDRINE SULFATE 10 MG: 50 INJECTION INTRAVENOUS at 08:08

## 2023-08-21 RX ADMIN — SODIUM CHLORIDE: 9 INJECTION, SOLUTION INTRAVENOUS at 12:08

## 2023-08-21 RX ADMIN — CEFAZOLIN 3 G: 330 INJECTION, POWDER, FOR SOLUTION INTRAMUSCULAR; INTRAVENOUS at 07:08

## 2023-08-21 RX ADMIN — ATORVASTATIN CALCIUM 20 MG: 10 TABLET, FILM COATED ORAL at 02:08

## 2023-08-21 RX ADMIN — HYDROMORPHONE HYDROCHLORIDE 0.2 MG: 1 INJECTION, SOLUTION INTRAMUSCULAR; INTRAVENOUS; SUBCUTANEOUS at 12:08

## 2023-08-21 RX ADMIN — ACETAMINOPHEN 1000 MG: 10 INJECTION, SOLUTION INTRAVENOUS at 01:08

## 2023-08-21 RX ADMIN — ONDANSETRON 4 MG: 2 INJECTION INTRAMUSCULAR; INTRAVENOUS at 10:08

## 2023-08-21 RX ADMIN — SODIUM CHLORIDE: 0.9 INJECTION, SOLUTION INTRAVENOUS at 06:08

## 2023-08-21 RX ADMIN — OXYCODONE HYDROCHLORIDE 5 MG: 5 TABLET ORAL at 12:08

## 2023-08-21 RX ADMIN — ROCURONIUM BROMIDE 25 MG: 10 INJECTION INTRAVENOUS at 09:08

## 2023-08-21 RX ADMIN — Medication 20 MG: at 08:08

## 2023-08-21 RX ADMIN — OXYCODONE HYDROCHLORIDE 5 MG: 5 TABLET ORAL at 11:08

## 2023-08-21 RX ADMIN — OXYBUTYNIN CHLORIDE 5 MG: 5 TABLET ORAL at 02:08

## 2023-08-21 RX ADMIN — AMLODIPINE BESYLATE 10 MG: 10 TABLET ORAL at 02:08

## 2023-08-21 RX ADMIN — PHENYLEPHRINE HYDROCHLORIDE 100 MCG: 10 INJECTION INTRAVENOUS at 11:08

## 2023-08-21 RX ADMIN — PROPOFOL 150 MG: 10 INJECTION, EMULSION INTRAVENOUS at 07:08

## 2023-08-21 RX ADMIN — HEPARIN SODIUM 5000 UNITS: 5000 INJECTION INTRAVENOUS; SUBCUTANEOUS at 02:08

## 2023-08-21 RX ADMIN — EPHEDRINE SULFATE 10 MG: 50 INJECTION INTRAVENOUS at 09:08

## 2023-08-21 RX ADMIN — HYDROMORPHONE HYDROCHLORIDE 0.2 MG: 1 INJECTION, SOLUTION INTRAMUSCULAR; INTRAVENOUS; SUBCUTANEOUS at 01:08

## 2023-08-21 RX ADMIN — ROCURONIUM BROMIDE 25 MG: 10 INJECTION INTRAVENOUS at 07:08

## 2023-08-21 RX ADMIN — Medication 10 MG: at 10:08

## 2023-08-21 RX ADMIN — Medication 20 MG: at 09:08

## 2023-08-21 RX ADMIN — LIDOCAINE HYDROCHLORIDE ANHYDROUS AND DEXTROSE MONOHYDRATE 0.02 MG/KG/MIN: .8; 5 INJECTION, SOLUTION INTRAVENOUS at 07:08

## 2023-08-21 RX ADMIN — METHOCARBAMOL 1000 MG: 500 TABLET ORAL at 12:08

## 2023-08-21 RX ADMIN — PHENYLEPHRINE HYDROCHLORIDE 200 MCG: 10 INJECTION INTRAVENOUS at 11:08

## 2023-08-21 RX ADMIN — PREGABALIN 150 MG: 75 CAPSULE ORAL at 05:08

## 2023-08-21 RX ADMIN — SODIUM CHLORIDE: 9 INJECTION, SOLUTION INTRAVENOUS at 11:08

## 2023-08-21 RX ADMIN — Medication 20 MG: at 10:08

## 2023-08-21 RX ADMIN — PHENYLEPHRINE HYDROCHLORIDE 100 MCG: 10 INJECTION INTRAVENOUS at 10:08

## 2023-08-21 RX ADMIN — PREDNISONE 10 MG: 5 TABLET ORAL at 12:08

## 2023-08-21 RX ADMIN — SUGAMMADEX 200 MG: 100 INJECTION, SOLUTION INTRAVENOUS at 11:08

## 2023-08-21 RX ADMIN — DEXMEDETOMIDINE 8 MCG: 100 INJECTION, SOLUTION, CONCENTRATE INTRAVENOUS at 11:08

## 2023-08-21 RX ADMIN — LIDOCAINE HYDROCHLORIDE 100 MG: 20 INJECTION INTRAVENOUS at 07:08

## 2023-08-21 RX ADMIN — HEPARIN SODIUM 5000 UNITS: 5000 INJECTION INTRAVENOUS; SUBCUTANEOUS at 05:08

## 2023-08-21 RX ADMIN — ACETAMINOPHEN 1000 MG: 500 TABLET ORAL at 11:08

## 2023-08-21 RX ADMIN — METHOCARBAMOL 1000 MG: 500 TABLET ORAL at 05:08

## 2023-08-21 RX ADMIN — ROCURONIUM BROMIDE 25 MG: 10 INJECTION INTRAVENOUS at 10:08

## 2023-08-21 RX ADMIN — METHOCARBAMOL 1000 MG: 500 TABLET ORAL at 11:08

## 2023-08-21 RX ADMIN — ROCURONIUM BROMIDE 25 MG: 10 INJECTION INTRAVENOUS at 08:08

## 2023-08-21 RX ADMIN — GLYCOPYRROLATE 0.2 MG: 0.2 INJECTION, SOLUTION INTRAMUSCULAR; INTRAVENOUS at 07:08

## 2023-08-21 RX ADMIN — ROCURONIUM BROMIDE 50 MG: 10 INJECTION INTRAVENOUS at 07:08

## 2023-08-21 RX ADMIN — EPHEDRINE SULFATE 10 MG: 50 INJECTION INTRAVENOUS at 10:08

## 2023-08-21 RX ADMIN — ROCURONIUM BROMIDE 10 MG: 10 INJECTION INTRAVENOUS at 10:08

## 2023-08-21 NOTE — ANESTHESIA PROCEDURE NOTES
Bilateral THALIA single shot    Patient location during procedure: pre-op   Block not for primary anesthetic.  Reason for block: at surgeon's request and post-op pain management   Post-op Pain Location: Bilateral abdomen   Start time: 8/21/2023 6:51 AM  Timeout: 8/21/2023 6:50 AM   End time: 8/21/2023 6:56 AM    Staffing  Authorizing Provider: Rubin Olmos MD  Performing Provider: Tono Gallego MD    Staffing  Performed by: Tono Gallego MD  Authorized by: Rubin Olmos MD    Preanesthetic Checklist  Completed: patient identified, IV checked, site marked, risks and benefits discussed, surgical consent, monitors and equipment checked, pre-op evaluation and timeout performed  Peripheral Block  Patient position: sitting  Prep: ChloraPrep  Patient monitoring: heart rate, cardiac monitor, continuous pulse ox, continuous capnometry and frequent blood pressure checks  Block type: erector spinae plane  Laterality: bilateral  Injection technique: single shot  Interspace: T6-7    Needle  Needle type: Stimuplex   Needle gauge: 20 G  Needle length: 4 in  Needle localization: anatomical landmarks and ultrasound guidance   -ultrasound image captured on disc.  Assessment  Injection assessment: negative aspiration, negative parasthesia and local visualized surrounding nerve  Paresthesia pain: none  Heart rate change: no  Slow fractionated injection: yes  Pain Tolerance: comfortable throughout block and no complaints  Medications:    Medications: bupivacaine (pf) (MARCAINE) injection 0.75% - Perineural   30 mL - 8/21/2023 6:55:00 AM    Additional Notes  Patient tolerated well.  See [facility] RN record for vitals.    A time out was conducted. Site misael confirmed with team and patient. Allergies reviewed.   Vital signs stable throughout block. RN monitoring vitals throughout.   Needle advanced under continuous ultrasound guidance.  Local injected incrementally after confirming negative aspiration. No signs or symptoms of  intravascular or intraneural injection noted.   No persistent paresthesias elicited or expressed. Patient tolerated procedure well.  30mL of 0.375% Bupivacaine with epinephrine 1:300K, PF dexamethasone 1 mg, and clonidine 50 mcg used for the block.

## 2023-08-21 NOTE — PLAN OF CARE
I have reviewed the chart of uJan Shepherd and collaborated with Solomon Rose MD in the care of the patient who is hospitalized for the following:    Active Hospital Problems    Diagnosis    *Prostate cancer    Severe obesity (BMI >= 40)    Essential hypertension    Mixed hyperlipidemia          I have reviewed the Juan Shepherd with the multidisciplinary team during discharge huddle.       Lucero Anderson PA-C  [facility]

## 2023-08-21 NOTE — NURSING
Pt admitted to the floor via stretcher. Transferred to bed without difficulty. Oriented to room and call light system. Pt resting comfortably at this time and VSS. Will continue to monitor.

## 2023-08-21 NOTE — ANESTHESIA PREPROCEDURE EVALUATION
08/21/2023  Juan Shepherd is a 72 y.o., male.    Patient Active Problem List   Diagnosis    Mixed hyperlipidemia    Essential hypertension    Family history of glaucoma    Age-related nuclear cataract of both eyes    Morbid obesity    Arthritis of knee    Seborrheic keratosis    Osteoarthritis of hand    Onychomycosis    Chronic allergic rhinitis    Prostate cancer    Multiple joint pain    GERD (gastroesophageal reflux disease)    Bilateral leg edema    PMR (polymyalgia rheumatica)    Current chronic use of systemic steroids    Normocytic anemia     Past Surgical History:   Procedure Laterality Date    CHOLECYSTECTOMY      COLONOSCOPY      FINGER SURGERY      HAND SURGERY      HERNIA REPAIR      VASECTOMY         Pre-op Assessment          Review of Systems    Physical Exam  General: Well nourished    Airway:  Mallampati: II   Mouth Opening: Normal  TM Distance: Normal  Tongue: Normal  Neck ROM: Normal ROM          Anesthesia Assessment: Preoperative EQUATION    Planned Procedure: Procedure(s) (LRB):  XI ROBOTIC PROSTATECTOMY (N/A)  LYMPHADENECTOMY, PELVIS (Bilateral)  Requested Anesthesia Type:General/Regional  Surgeon: Solomon Rose MD  Service: Urology  Known or anticipated Date of Surgery:8/21/2023    Surgeon notes: reviewed    Previous anesthesia records:Not available    Last PCP note: within 3 months , within [facility]   Subspecialty notes: Radiology/ONC, Urology    Other important co-morbidities: HTN, Morbid Obesity, and Arthritis, Reflux       Tests already available:  Available tests,  within 3 months , within [facility] .  7/7/2023 CMP, CBC      Optimization:  Anesthesia Preop Clinic Assessment  Indicated.    Medical Opinion Indicated.           Plan:    Testing:  EKG and T&S   Pre-anesthesia  visit       Visit focus: concerns in complex and/or prolonged anesthesia, position  other than supine     Consultation:IM Perioperative Hospitalist     Patient  has previously scheduled Medical Appointment: N/A    Navigation: Tests Scheduled.              Consults scheduled.             Results will be tracked by Preop Clinic.      Anesthesia Plan  Type of Anesthesia, risks & benefits discussed:    Anesthesia Type: Gen ETT, Gen Natural Airway  Intra-op Monitoring Plan: Standard ASA Monitors  Post Op Pain Control Plan: multimodal analgesia  Induction:  IV  Airway Plan: Direct  Informed Consent: Informed consent signed with the Patient and all parties understand the risks and agree with anesthesia plan.  All questions answered.   ASA Score: 2  Day of Surgery Review of History & Physical: H&P Update referred to the surgeon/provider.    Ready For Surgery From Anesthesia Perspective.       .

## 2023-08-21 NOTE — OP NOTE
8/21/2023      Procedure:   1) laparoscopic radical prostatectomy with robotic assistance  2) laparoscopic bilateral pelvic lymph node dissection    Preop diagnosis: Prostate Cancer  Postop diagnosis: Prostate Cancer, Stage T1C    Surgeon: Mike Rose MD (present for the entire procedure)  Assistant: DUSTY Lizarraga MD  EBL: 100 ccs    Wound Class: 2    Specimens removed: prostate, seminal vesicles, lymph nodes, bladder neck biopsy    Drain: Monte      PROCEDURE NOTE:  Preoperatively the H&P were updated. Also confirmed that patient had had their hibiclens shower and preop hydration. After general endotracheal anesthesia, the patient was carefully positioned, padded, prepped and draped.  Positioning and padding was checked by the surgeon and the circulating nurse.  IV antibiotics were administered within 1 hour prior to making initial skin incision.  An OG tube was placed.  A Monte catheter was placed.  A timeout was called. The patient's identity was confirmed with 2 identifiers.  The correct procedure, allergies, blood products were verified by the entire operative team.                                                                      A Veress needle was placed at the supraumbilical position and the abdomen insufflated to 15 mmHg.  A 12 mm trocar was placed at this site and a 0 degree camera was introduced. The abdominal cavity was carefully inspected. There was no evidence of trauma to the peritoneal contents, nor any evidence of injury to the retroperitoneum.  All accessory trocars were then placed under direct vision.                                                                             The peritoneum posterior to the bladder was incised, the right vas deferens identified and divided.  The right seminal vesicle was gently dissected away from surrounding tissue with care being taken not to cause stretch  or thermal injury to the lateral pedicle. A similar procedure was performed  on  the left side.  Both  seminal vesicles were retracted anteriorly. Denonvilliers fascia was incised and this plane of dissection carried down to the level of the apex of the prostate.  A posterior/lateral release was performed bilaterally.                                                                                                                                            An anterior bladder drop was performed.  After dropping the bladder, a right sided pelvic lymph node dissection was completed and this was sent as permanent pathologic specimen #1.  The endopelvic fascia on the right  was gently dissected posteriorly, thus beginning the lateral release.  A left sided pelvic lymph node dissection was performed and this was sent  as permanent pathologic specimen #2.  Again, the endopelvic fascia was   gently dissected posteriorly, completing the lateral release.  A V stitch was used in a figure of eight fashion to control the dorsal venous complex. Excellent hemostasis was noted at this juncture. The plane between the bladder neck and prostate base was developed and the urethra was divided, a bladder neck sparing approach was utilized.  Excellent preservation of the internal sphincter was achieved circumferentially.     The bladder neck biopsy was sent as permanent section specimen #3.      After dividing the posterior bladder neck, the seminal vesicles and vas ampulla were revisualized and retracted anteriorly. The lateral pedicle on the right was controlled with bipolar cautery.      Cold scissors were used to athermically dissect the neurovascular bundle away  from the capsule of the prostate down to the level of the urethra, thus preserving the right neurovascular bundle.      A similar procedure was performed on the left side.    The urethra at the apex was divided preserving maximal urethral length.The rectourethralis was divided. The specimen was placed in the right colic gutter. The pelvis was  irrigated and carefully  inspected.  There was no evidence of rectal trauma and there was excellent hemostasis.  A vesicourethral anastomosis was then performed with a running suture of 3-0 Monocryl.  After completing the anastomosis, a fresh Monte catheter was advanced into the bladder and the balloon  inflated.  The bladder was irrigated with 120  ccs twice. Once prior to tying the anastomotic suture and once after, there was noted to be no extravasation at the anastomosis. The specimen was placed in an endocatch bag.     Throughout the procedure the first assistant assisted with positioning, trocar placement, docking. She also provided assistance with exposure, visualization, traction/countertraction, suction and irrigation.     A drain .was not placed, specimen was removed from the field and port sites were closed.  Needle and sponge counts were correctThe patient was transferred to the Recovery Room in stable condition.

## 2023-08-21 NOTE — ANESTHESIA PROCEDURE NOTES
Intubation    Date/Time: 8/21/2023 7:16 AM    Performed by: Reinaldo Aguirre CRNA  Authorized by: Aliyah Rojas MD    Intubation:     Induction:  Intravenous    Intubated:  Postinduction    Mask Ventilation:  Easy mask    Attempts:  1    Attempted By:  CRNA    Method of Intubation:  Video laryngoscopy    Blade:  Dobbins 4    Laryngeal View Grade: Grade I - full view of cords      Difficult Airway Encountered?: No      Complications:  None    Airway Device:  Oral endotracheal tube    Airway Device Size:  7.5    Style/Cuff Inflation:  Cuffed (inflated to minimal occlusive pressure)    Tube secured:  24    Secured at:  The lips    Placement Verified By:  Capnometry    Complicating Factors:  None    Findings Post-Intubation:  BS equal bilateral and atraumatic/condition of teeth unchanged

## 2023-08-21 NOTE — PLAN OF CARE
Chart reviewed. Preop nursing care completed per orders. Safe surgery checklist complete. Family at bedside and to take belongings. Waiting for update to H&P and anesthesia consent prior to surgery. Call bell within reach. Instructed pt to call for assistance.

## 2023-08-21 NOTE — NURSING
Nurses Note -- 4 Eyes      8/21/2023   6:30 PM      Skin assessed during: Admit      [x] No Altered Skin Integrity Present    []Prevention Measures Documented      [] Yes- Altered Skin Integrity Present or Discovered   [] LDA Added if Not in Epic (Describe Wound)   [] New Altered Skin Integrity was Present on Admit and Documented in LDA   [] Wound Image Taken    Wound Care Consulted? No    Attending Nurse:   Chiara Enrique RN    Second RN/Staff Member:   Farshad Rivas RN

## 2023-08-21 NOTE — TRANSFER OF CARE
"Anesthesia Transfer of Care Note    Patient: Juan Shepherd    Procedure(s) Performed: Procedure(s) (LRB):  XI ROBOTIC PROSTATECTOMY (N/A)  LYMPHADENECTOMY, PELVIS (Bilateral)    Patient location: PACU    Anesthesia Type: general    Transport from OR: Transported from OR on 6-10 L/min O2 by face mask with adequate spontaneous ventilation    Post pain: adequate analgesia    Post assessment: no apparent anesthetic complications    Post vital signs: stable    Level of consciousness: sedated    Nausea/Vomiting: no nausea/vomiting    Complications: none    Transfer of care protocol was followed      Last vitals:   Visit Vitals  BP 95/52   Pulse 64   Temp 36.3 °C (97.3 °F) (Temporal)   Resp 18   Ht 5' 10" (1.778 m)   Wt 125.2 kg (276 lb 0.3 oz)   SpO2 98%   BMI 39.60 kg/m²     "

## 2023-08-21 NOTE — NURSING TRANSFER
Nursing Transfer Note      8/21/2023   2:41 PM    Reason patient is being transferred: post-procedure    Transfer To: 511    Transfer via bed    Transfer with none    Transported by PCTs    Additional Lines: Monte Catheter  Medicines sent: LR gtt    Any special needs or follow-up needed: routine    Patient belongings transferred with patient: Yes    Chart send with patient: Yes    Notified: daughter

## 2023-08-22 VITALS
WEIGHT: 275 LBS | TEMPERATURE: 98 F | SYSTOLIC BLOOD PRESSURE: 103 MMHG | HEART RATE: 77 BPM | DIASTOLIC BLOOD PRESSURE: 59 MMHG | OXYGEN SATURATION: 95 % | BODY MASS INDEX: 39.37 KG/M2 | HEIGHT: 70 IN | RESPIRATION RATE: 18 BRPM

## 2023-08-22 PROCEDURE — 25000003 PHARM REV CODE 250: Mod: HCNC | Performed by: STUDENT IN AN ORGANIZED HEALTH CARE EDUCATION/TRAINING PROGRAM

## 2023-08-22 PROCEDURE — 63600175 PHARM REV CODE 636 W HCPCS: Mod: HCNC | Performed by: STUDENT IN AN ORGANIZED HEALTH CARE EDUCATION/TRAINING PROGRAM

## 2023-08-22 RX ORDER — NITROFURANTOIN MACROCRYSTALS 50 MG/1
50 CAPSULE ORAL DAILY
Qty: 8 CAPSULE | Refills: 0 | Status: SHIPPED | OUTPATIENT
Start: 2023-08-22 | End: 2023-09-28

## 2023-08-22 RX ORDER — POLYETHYLENE GLYCOL 3350 17 G/17G
17 POWDER, FOR SOLUTION ORAL DAILY
Qty: 238 G | Refills: 0 | Status: SHIPPED | OUTPATIENT
Start: 2023-08-22 | End: 2023-09-28

## 2023-08-22 RX ORDER — OXYCODONE HYDROCHLORIDE 5 MG/1
5 TABLET ORAL EVERY 4 HOURS PRN
Qty: 10 TABLET | Refills: 0 | Status: SHIPPED | OUTPATIENT
Start: 2023-08-22 | End: 2023-09-28

## 2023-08-22 RX ADMIN — ACETAMINOPHEN 1000 MG: 500 TABLET ORAL at 05:08

## 2023-08-22 RX ADMIN — SODIUM CHLORIDE: 9 INJECTION, SOLUTION INTRAVENOUS at 06:08

## 2023-08-22 RX ADMIN — PREDNISONE 10 MG: 5 TABLET ORAL at 09:08

## 2023-08-22 RX ADMIN — PANTOPRAZOLE SODIUM 40 MG: 40 TABLET, DELAYED RELEASE ORAL at 09:08

## 2023-08-22 RX ADMIN — HEPARIN SODIUM 5000 UNITS: 5000 INJECTION INTRAVENOUS; SUBCUTANEOUS at 05:08

## 2023-08-22 RX ADMIN — METHOCARBAMOL 1000 MG: 500 TABLET ORAL at 09:08

## 2023-08-22 NOTE — ANESTHESIA POSTPROCEDURE EVALUATION
Anesthesia Post Evaluation    Patient: Juan Shepherd    Procedure(s) Performed: Procedure(s) (LRB):  XI ROBOTIC PROSTATECTOMY (N/A)  LYMPHADENECTOMY, PELVIS (Bilateral)    Final Anesthesia Type: general      Patient location during evaluation: PACU  Patient participation: Yes- Able to Participate  Level of consciousness: awake and alert and oriented  Pain management: adequate  Airway patency: patent    PONV status at discharge: No PONV  Anesthetic complications: no      Cardiovascular status: blood pressure returned to baseline and hemodynamically stable  Respiratory status: unassisted  Hydration status: euvolemic  Follow-up not needed.          Vitals Value Taken Time   /59 08/22/23 0745   Temp 36.9 °C (98.4 °F) 08/22/23 0745   Pulse 77 08/22/23 0745   Resp 18 08/22/23 0745   SpO2 95 % 08/22/23 0745         Event Time   Out of Recovery 13:30:00         Pain/Bony Score: Pain Rating Prior to Med Admin: 3 (8/22/2023  5:46 AM)  Pain Rating Post Med Admin: 3 (8/22/2023 12:22 AM)  Bony Score: 9 (8/21/2023  1:30 PM)

## 2023-08-22 NOTE — PLAN OF CARE
[facility]  Initial Discharge Assessment       Primary Care Provider: Soo Bernard NP    Admission Diagnosis: Prostate cancer [C61]    Admission Date: 8/21/2023  Expected Discharge Date: 8/22/2023    Transition of Care Barriers: None    Payor: [employer] MANAGED MEDICARE / Plan: [employer] MEDICARE HMO / Product Type: Capitation /     Extended Emergency Contact Information  Primary Emergency Contact: Criss Shepherd  Address: [address] [place] [address] .           [place], [place] [id number] [place] [address]  Home Phone: [phone]  Relation: Spouse    Discharge Plan A: Home with family         [facility] - [place], [place] - [address]  [address] [address]  Phone: [phone] Fax: [phone]    [id number] - [place] - [address]  [address]  [facility] [address]  Phone: [phone] Fax: [phone]      Initial Assessment (most recent)       Adult Discharge Assessment - 08/22/23 0929          Discharge Assessment    Assessment Type Discharge Planning Assessment     Confirmed/corrected address, phone number and insurance Yes     Confirmed Demographics Correct on Facesheet     Source of Information patient;family     Communicated CORBIN with patient/caregiver Yes     People in Home spouse     Do you expect to return to your current living situation? Yes     Do you have help at home or someone to help you manage your care at home? Yes     Prior to hospitilization cognitive status: Alert/Oriented     Current cognitive status: Alert/Oriented     Home Accessibility wheelchair accessible     Equipment Currently Used at Home none     Readmission within 30 days? No     Patient currently being followed by outpatient case management? No     Do you currently have service(s) that help you manage your care at home? No     Do you take prescription medications? Yes     Do you have prescription coverage? Yes     Do you  have any problems affording any of your prescribed medications? No     Is the patient taking medications as prescribed? yes     How do you get to doctors appointments? car, drives self     Are you on dialysis? No     Do you take coumadin? No     DME Needed Upon Discharge  none     Discharge Plan discussed with: Patient;Adult children     Transition of Care Barriers None     Discharge Plan A Home with family                         SW completed discharge planning assessment with the patient at bedside. SW verified demographic information listed on the pt.'s Face sheet. Pt reports living with his spouse in a single story home. Pt's spouse has a diagnosis of (Parkinson's disease) unable to help the pt upon d/c. Pt's daughter (Mele) at bedside will provide transport and help manage the pt's needs upon d/c.Patient doesn't report utilizing any equipment prior to this hospital stay, nor reports any DME needs upon discharge at this time.     Melissa Martinez, DOMINIK  Case Management   [facility]   Ext. [phone]

## 2023-08-22 NOTE — DISCHARGE INSTRUCTIONS
No lifting greater than 10 pounds for 6 weeks from day of surgery.  No pushing/pulling such as vacuuming or raking.  No straining, avoid constipation and take stool softeners as described and laxatives as needed.  No driving while on narcotics and until you can react quickly without pain.  Wait 24 hours after surgery before showering. After 24 hours you can shower but no swimming, bathing, or otherwise submerging the incision in water.    You will have a catheter after your surgery.  This catheter protects your tissues to allow for healing between the bladder neck and the urethra now that the prostate has been removed.  NO ONE IS TO REMOVE THIS CATHETER OR CHANGE THE CATHETER OTHER THAN SOMEONE FROM [facility].  If you have to go to the ER because your catheter is not draining, come to the [facility] if possible and they will call us if they are not able to irrigate your catheter.  If you live out of town and have to go to a local ER, then DO NOT let that doctor remove your catheter. If they are unable to irrigate the catheter or are having troubles with it, have them page the [facility] resident on call immediately at [phone] to help answer any questions.  The catheter should come out in 7-10 days.   You will have a midline incision after surgery where the prostate was removed. This will be sewn with absorbable suture so you do not need to worry about having the sutures removed.  You will have smaller incisions where the instruments were inserted that are also sewn closed with absorbable sutures.  The night of surgery we expect and hope that you will:  Walk - walking helps get the bowels moving. Also after your surgery, you are at a risk for a deep venous thrombosis (which is a clot in the legs that can form by remaining inactive or still for extended periods of time) and this can travel to your lungs and make you feel short of breath. This is a very serious condition. Walking helps  prevent a DVT from occurring.  Eat - you do not have to eat a whole meal, but we want to make sure you can tolerate liquid and/or solid food without nausea and vomiting  Use your incentive spirometer - this is the breathing apparatus that helps you expand your lungs. If and when you have pain you will not want to take deep breaths. But if you dont take deep breaths, you are at risk for pneumonia. The incentive spirometer will help prevent this from occurring by expanding your lungs.  Symptoms you may experience immediately post-op:  Bloating and/or shoulder pain - when we do this operation, we fill up your abdominal cavity with gas to better help us visualize the organs and allow our instruments to fit. After the surgery, not all the air can be removed and your body will eventually absorb this small amount of air. However this can make you feel bloated. In addition, when you sit up, the air can sit right under a muscle (the diaphragm) which has connecting nerves to the shoulders, which could explain why you have shoulder pain.  Do not expect necessarily to have gas or to have a bowel movement - this goes along with the bloating, you may feel like you want to pass gas or have a bowel movement but you cant. This is normal and you will feel like this for a couple days. There are no pills to help with this. Small walks throughout the day should help with this.  Pain - your pain should be able to be controlled with medicines by mouth that we prescribe. It is important for you to tell us if you are on any pain medications at home before the surgery as you may need stronger pain meds while in the hospital.  Bladder spasms - this feels like you have to urinate but cant. Sometimes it can be due to clots clogging up your catheter. The nurse will irrigate the catheter, if there are no clots we can prescribe you an anti-spasmodic. We can also send you home with a prescription for one.  If you go home on anti-spasmodics, do  not take one the morning of your appointment to have your catheter removed or you may not be able to void.  When you go home:  Catheter care  Blood in your urine (hematuria) is normal. However if you are having clots or your catheter stops draining and you are experiencing abdominal pain, go to the ER.  If the tip of your penis hurts with the catheter in place, you can put some Vaseline at the end of the catheter to help lubricate the catheter.  Kegels - do not start your Kegels until after your catheter is out.  When to return to the ER  Fever - If you have a fever >101.5, this could be due to a number of reasons such as infection of the urine or incision. If your catheter has been removed, you could possibly have a leak. It would be best to come to the ER so they can better evaluate you.  Severe pain - pain is expected, but severe or new onset of pain is not normal.   Inability to tolerate food or liquid with nausea and vomiting - it would be best to go to the ER for them to better evaluate you.

## 2023-08-22 NOTE — NURSING
Pt received discharge instructions. Education provided on home tello care. Pt and family verbalized understanding of all instructions. Prescriptions delivered to bedside via pharmacy. PIV removed, no redness/swelling. Pt ambulated halls without difficulty and tolerated diet. Awaiting wheelchair for transport.

## 2023-08-22 NOTE — ASSESSMENT & PLAN NOTE
72M w/ hx of prostate cancer s/p RALP on 8/21  - regular diet, needs to eat today   - OOB, ambulate in halls  - home meds   - multimodal pain control   - tello to gravity   - miralax   - dispo: d/c today pending ambulation, tolerating diet   - d/c with tello in place, will arrange for cystogram and voiding trial in approx. 1 week

## 2023-08-22 NOTE — SUBJECTIVE & OBJECTIVE
"Interval History: NAEO.  AFVSS.  Overall doing well, no complaints.  Has not eaten or ambulated yet.      /150    Review of Systems: per HPI   Objective:     Temp:  [95.6 °F (35.3 °C)-97.9 °F (36.6 °C)] 96.3 °F (35.7 °C)  Pulse:  [61-81] 74  Resp:  [12-22] 18  SpO2:  [92 %-100 %] 92 %  BP: ()/(52-81) 100/66     Body mass index is 39.46 kg/m².           Drains       Drain  Duration                  Urethral Catheter 08/21/23 0741 18 Fr. <1 day                     Physical Exam  Constitutional:       General: He is not in acute distress.  HENT:      Head: Normocephalic.   Eyes:      Extraocular Movements: Extraocular movements intact.   Cardiovascular:      Rate and Rhythm: Normal rate.   Pulmonary:      Effort: Pulmonary effort is normal. No respiratory distress.   Abdominal:      Palpations: Abdomen is soft. There is no mass.      Comments: Port incisions c/d/i   Genitourinary:     Comments: Monte draining clear yellow urine   Musculoskeletal:         General: No swelling or deformity.      Cervical back: Normal range of motion.   Skin:     General: Skin is warm and dry.   Neurological:      General: No focal deficit present.      Mental Status: He is alert.   Psychiatric:         Mood and Affect: Mood normal.         Behavior: Behavior normal.           Significant Labs:    BMP:  Recent Labs   Lab 08/21/23  1403   CREATININE 0.8       CBC:   No results for input(s): "WBC", "HGB", "HCT", "PLT" in the last 168 hours.    All pertinent labs results from the past 24 hours have been reviewed.    Significant Imaging:  All pertinent imaging results/findings from the past 24 hours have been reviewed.                "

## 2023-08-22 NOTE — PROGRESS NOTES
"[facility] - Surgery  Urology  Progress Note    Patient Name: Juan Shepherd  MRN: [id number]  Admission Date: 8/21/2023  Hospital Length of Stay: 1 days  Code Status: No Order   Attending Provider: Solomon Rose MD   Primary Care Physician: Soo Bernard NP    Subjective:     HPI:  No notes on file    Interval History: NAEO.  AFVSS.  Overall doing well, no complaints.  Has not eaten or ambulated yet.      /150    Review of Systems: per HPI   Objective:     Temp:  [95.6 °F (35.3 °C)-97.9 °F (36.6 °C)] 96.3 °F (35.7 °C)  Pulse:  [61-81] 74  Resp:  [12-22] 18  SpO2:  [92 %-100 %] 92 %  BP: ()/(52-81) 100/66     Body mass index is 39.46 kg/m².           Drains       Drain  Duration                  Urethral Catheter 08/21/23 0741 18 Fr. <1 day                     Physical Exam  Constitutional:       General: He is not in acute distress.  HENT:      Head: Normocephalic.   Eyes:      Extraocular Movements: Extraocular movements intact.   Cardiovascular:      Rate and Rhythm: Normal rate.   Pulmonary:      Effort: Pulmonary effort is normal. No respiratory distress.   Abdominal:      Palpations: Abdomen is soft. There is no mass.      Comments: Port incisions c/d/i   Genitourinary:     Comments: Monte draining clear yellow urine   Musculoskeletal:         General: No swelling or deformity.      Cervical back: Normal range of motion.   Skin:     General: Skin is warm and dry.   Neurological:      General: No focal deficit present.      Mental Status: He is alert.   Psychiatric:         Mood and Affect: Mood normal.         Behavior: Behavior normal.           Significant Labs:    BMP:  Recent Labs   Lab 08/21/23  1403   CREATININE 0.8       CBC:   No results for input(s): "WBC", "HGB", "HCT", "PLT" in the last 168 hours.    All pertinent labs results from the past 24 hours have been reviewed.    Significant Imaging:  All pertinent imaging results/findings from the past 24 hours have been " reviewed.                    Assessment/Plan:     * Prostate cancer  72M w/ hx of prostate cancer s/p RALP on 8/21  - regular diet, needs to eat today   - OOB, ambulate in halls  - home meds   - multimodal pain control   - tello to gravity   - miralax   - dispo: d/c today pending ambulation, tolerating diet   - d/c with tello in place, will arrange for cystogram and voiding trial in approx. 1 week         VTE Risk Mitigation (From admission, onward)         Ordered     heparin (porcine) injection 5,000 Units  Every 8 hours         08/21/23 1204     Place sequential compression device  Until discontinued         08/21/23 1204     Place sequential compression device  Until discontinued         08/21/23 1204     Place DAIANA hose  Until discontinued         08/21/23 0516     Place sequential compression device  Until discontinued         08/21/23 0516                Lucero Galan MD  Urology  [facility] - Surgery

## 2023-08-23 ENCOUNTER — TELEPHONE (OUTPATIENT)
Dept: UROLOGY | Facility: CLINIC | Age: 73
End: 2023-08-23
Payer: MEDICARE

## 2023-08-23 ENCOUNTER — PATIENT OUTREACH (OUTPATIENT)
Dept: ADMINISTRATIVE | Facility: CLINIC | Age: 73
End: 2023-08-23
Payer: MEDICARE

## 2023-08-23 DIAGNOSIS — C61 PROSTATE CANCER: Primary | ICD-10-CM

## 2023-08-23 NOTE — DISCHARGE SUMMARY
[facility]  Urology  Discharge Summary      Patient Name: Juan Shepherd  MRN: [id number]  Admission Date: 8/21/2023  Hospital Length of Stay: 1 days  Discharge Date and Time: 8/22/2023 12:59 PM  Attending Physician: No att. providers found   Discharging Provider: Lucero Galan MD  Primary Care Physician: Soo Bernard NP    HPI:   No notes on file    Procedure(s) (LRB):  XI ROBOTIC PROSTATECTOMY (N/A)  LYMPHADENECTOMY, PELVIS (Bilateral)     Indwelling Lines/Drains at time of discharge:   Lines/Drains/Airways     Drain  Duration                Urethral Catheter 08/21/23 0741 18 Fr. 1 day                Hospital Course (synopsis of major diagnoses, care, treatment, and services provided during the course of the hospital stay): Patient was brought to the hospital for the above procedure.  The patient tolerated it well.  Post op, the patient's diet was advanced, their pain was controlled, and the patient was ambulating without problem.  The patient will follow up in 1 week for cystogram and voiding trial.  The patient was given prescriptions for oxycodone, miralax, macrodantin.      Goals of Care Treatment Preferences:       Living Will: Yes              Consults:     Significant Diagnostic Studies: none    Pending Diagnostic Studies:     Procedure Component Value Units Date/Time    Specimen to Pathology, Surgery Urology [[id number]] Collected: 08/21/23 1147    Order Status: Sent Lab Status: In process Updated: 08/21/23 1438    Specimen: Tissue           Final Active Diagnoses:    Diagnosis Date Noted POA    PRINCIPAL PROBLEM:  Prostate cancer [C61] 06/26/2023 Yes    Severe obesity (BMI >= 40) [E66.01] 08/21/2023 Yes    Essential hypertension [I10]  Yes    Mixed hyperlipidemia [E78.2]  Yes      Problems Resolved During this Admission:         Discharged Condition: good    Disposition: Home or Self Care    Follow Up:   Follow-up Information     Solomon Rose MD Follow up on 8/29/2023.    Specialty:  Urology  Why: follow up for cystogram and catheter removal  Contact information:  [address]  [address]  [facility] [address]  [phone]                       Patient Instructions:      EKG 12-lead   Standing Status: Future Number of Occurrences: 1 Standing Exp. Date: 08/07/24     Type & Screen   Standing Status: Future Number of Occurrences: 1 Standing Exp. Date: 10/05/24     Type And Screen Preop   Standing Status: Future Standing Exp. Date: 10/13/24     Medications:  Reconciled Home Medications:      Medication List      START taking these medications    nitrofurantoin 50 MG capsule  Commonly known as: MACRODANTIN  Take 1 capsule (50 mg total) by mouth once daily. for 8 days     oxyCODONE 5 MG immediate release tablet  Commonly known as: ROXICODONE  Take 1 tablet (5 mg total) by mouth every 4 (four) hours as needed for Pain.     polyethylene glycol 17 gram/dose powder  Commonly known as: GLYCOLAX  Use cap to measure out (17 g) mix with a liquid and take by mouth once daily. for 14 days        ASK your doctor about these medications    amLODIPine 10 MG tablet  Commonly known as: NORVASC  Take 1 tablet (10 mg total) by mouth once daily.     esomeprazole 40 MG capsule  Commonly known as: NEXIUM  Take 1 capsule (40 mg total) by mouth once daily.     lisinopriL-hydrochlorothiazide 20-12.5 mg per tablet  Commonly known as: PRINZIDE,ZESTORETIC  Take 1 tablet by mouth once daily.     predniSONE 10 MG tablet  Commonly known as: DELTASONE  Take 1 tablet (10 mg total) by mouth once daily.     simvastatin 40 MG tablet  Commonly known as: ZOCOR  Take 1 tablet (40 mg total) by mouth every evening.            Time spent on the discharge of patient: 5 minutes    Lucero Galan MD  Urology  [facility] - Surgery

## 2023-08-23 NOTE — TELEPHONE ENCOUNTER
Placed a post op call to the patient, he denies pain or discomfort, state urine is clear, good appetite, just havnt had a BM but taking stool softener , Mitamucil

## 2023-08-23 NOTE — PROGRESS NOTES
C3 nurse spoke with Juan Shepherd  for a TCC post hospital discharge follow up call. The patient does not have a scheduled HOSFU appointment with Soo Bernard NP  within 5-7 days post hospital discharge date 8/22/23. Declined scheduling assistance for HOSFU, pt states he will call PCP himself to schedule.

## 2023-08-28 ENCOUNTER — HOSPITAL ENCOUNTER (OUTPATIENT)
Dept: RADIOLOGY | Facility: HOSPITAL | Age: 73
Discharge: HOME | End: 2023-08-28
Attending: UROLOGY
Payer: MEDICARE

## 2023-08-28 DIAGNOSIS — C61 PROSTATE CANCER: ICD-10-CM

## 2023-08-28 LAB
FINAL PATHOLOGIC DIAGNOSIS: NORMAL
Lab: NORMAL
SUPPLEMENTAL DIAGNOSIS: NORMAL

## 2023-08-28 PROCEDURE — 51600 INJECTION FOR BLADDER X-RAY: CPT | Mod: HCNC,,, | Performed by: RADIOLOGY

## 2023-08-28 PROCEDURE — 51600 FL CYSTOGRAM MIN 3 VIEWS RADIOLOGIST PERFORMED: ICD-10-PCS | Mod: HCNC,,, | Performed by: RADIOLOGY

## 2023-08-28 PROCEDURE — 74430 CONTRAST X-RAY BLADDER: CPT | Mod: 26,HCNC,, | Performed by: RADIOLOGY

## 2023-08-28 PROCEDURE — 74430 CONTRAST X-RAY BLADDER: CPT | Mod: TC,HCNC

## 2023-08-28 PROCEDURE — 25500020 PHARM REV CODE 255: Mod: HCNC | Performed by: UROLOGY

## 2023-08-28 PROCEDURE — 74430 FL CYSTOGRAM MIN 3 VIEWS RADIOLOGIST PERFORMED: ICD-10-PCS | Mod: 26,HCNC,, | Performed by: RADIOLOGY

## 2023-08-28 RX ADMIN — DIATRIZOATE MEGLUMINE 125 ML: 180 INJECTION, SOLUTION INTRAVESICAL at 10:08

## 2023-08-28 NOTE — PROGRESS NOTES
Clinic Note  8/28/2023      Subjective:         Chief Complaint:   TIRSO Shepherd is a 72 y.o. male diagnosed with prostate cancer. Consult from Dr Cotto.  Co-morbidities- morbid obesity, HTN, polymyalgia rheumatica.  Lives in [place], retired from [employer]. Here with his wife and daughter.  Has lost 30 pounds on diet since January.  PSH: lap liz, lap umbilical repair.     FH -negative  PSA - 4.6  AJCC Stage - T1C  STAR CAP stage- IIC  Volume - 49 ccs  MRI - 4/13/2023-(1.5T) 0.8 RAPZ PI-RADS 4,MRI negative for EPE (extraprostatic extension), NVBI (neurovascular bundle involvement), SVI (seminal vesicle involvement), or nodes. To my review lesion extends from apex to right mid gland with the largest component at left mid. Borderline PI-RADS 5 with coronal length (1.5 cm).  Biopsy - 5/30/2023- Prem 4+3 (GG3) right base 2/2 5%; [place] 3+4 right middle 2/2 40%, left apex 2/2 15%; [place] 3+3 left base 2/2 5%, left middle 2/2 10%. Overall 5/6 sites, 10/12 cores.  SHAMIR Score - 5 intermediate  NCCN - unfavorable intermediate  Germline testing - not indicated  Somatic testing - discussed  STAR CAP-     8/29/2023- RALP (robotic assisted laparoscopic prostatectomy) 8/21/2023, path pending. Cystogram yesterday showed no leak. Will D/C tello today.  Normal diet, bowel movements, ambulation.      Lab Results   Component Value Date    PSA 4.6 (H) 02/02/2023    PSA 3.9 05/27/2021    PSA 2.7 01/31/2020    PSA 2.2 01/31/2019    PSA 1.9 01/24/2018    PSA 2.2 12/08/2016    PSA 1.8 11/19/2015    PSA 1.6 12/03/2014    PSADIAG 4.1 (H) 03/21/2023      Past Medical History:   Diagnosis Date    Arthritis     Cataract     Cough 01/30/2018    Duodenitis     EGD 8/19/2016 (see outside procedure 10/27/2016 under Media)    Elevated cholesterol     Ex-smoker     Gastritis     EGD 8/19/2016 (see outside procedure 10/27/2016 under Media)    GERD (gastroesophageal reflux disease)     Hiatal hernia     EGD 8/19/2016 (see outside procedure  10/27/2016 under Media)    Hypertension     Morbid obesity     Normocytic anemia 2023    Pre-operative clearance 2018    Reflux esophagitis     EGD 2016 (see outside procedure 10/27/2016 under Media)    Tinnitus     and hL eval by ent diana     Family History   Problem Relation Age of Onset    Glaucoma Mother     Pulmonary embolism Mother     Dementia Father     Anxiety disorder Father     Post-traumatic stress disorder Father     Restless legs syndrome Sister     Edema Sister     Polymyalgia rheumatica Brother     Clotting disorder Brother     Skin cancer Daughter     No Known Problems Son     No Known Problems Brother     No Known Problems Sister      Social History     Socioeconomic History    Marital status:     Number of children: 2   Tobacco Use    Smoking status: Former     Current packs/day: 0.00     Average packs/day: 0.5 packs/day for 6.0 years (3.0 ttl pk-yrs)     Types: Cigarettes     Start date:      Quit date:      Years since quittin.6    Smokeless tobacco: Never    Tobacco comments:     light smoker quit over 36 y/   Substance and Sexual Activity    Alcohol use: Yes     Alcohol/week: 1.0 - 2.0 standard drink of alcohol     Types: 1 - 2 Shots of liquor per week     Comment: occasionally    Drug use: No    Sexual activity: Yes     Partners: Female   Social History Narrative        Brother dee dee clark    Retired [employer]     Social Determinants of Health     Financial Resource Strain: Low Risk  (4/3/2023)    Overall Financial Resource Strain (CARDIA)     Difficulty of Paying Living Expenses: Not hard at all   Food Insecurity: No Food Insecurity (4/3/2023)    Hunger Vital Sign     Worried About Running Out of Food in the Last Year: Never true     Ran Out of Food in the Last Year: Never true   Transportation Needs: No Transportation Needs (4/3/2023)    PRAPARE - Transportation     Lack of Transportation (Medical): No     Lack of Transportation  (Non-Medical): No   Physical Activity: Unknown (4/3/2023)    Exercise Vital Sign     Days of Exercise per Week: 0 days   Stress: No Stress Concern Present (4/3/2023)    [place] [employer] - Occupational Stress Questionnaire     Feeling of Stress : Not at all   Social Connections: Socially Integrated (4/3/2023)    Social Connection and Isolation Panel [NHANES]     Frequency of Communication with Friends and Family: More than three times a week     Frequency of Social Gatherings with Friends and Family: Once a week     Attends Faith Services: More than 4 times per year     Active Member of Clubs or Organizations: Yes     Attends Club or Organization Meetings: More than 4 times per year     Marital Status:    Housing Stability: Low Risk  (4/3/2023)    Housing Stability Vital Sign     Unable to Pay for Housing in the Last Year: No     Number of Places Lived in the Last Year: 1     Unstable Housing in the Last Year: No     Past Surgical History:   Procedure Laterality Date    CHOLECYSTECTOMY      COLONOSCOPY      FINGER SURGERY      HAND SURGERY      HERNIA REPAIR      LYMPHADENECTOMY, PELVIS Bilateral 8/21/2023    Procedure: LYMPHADENECTOMY, PELVIS;  Surgeon: Solomon Rose MD;  Location: [address];  Service: Urology;  Laterality: Bilateral;    ROBOT-ASSISTED LAPAROSCOPIC PROSTATECTOMY USING DA LORRAINE XI N/A 8/21/2023    Procedure: XI ROBOTIC PROSTATECTOMY;  Surgeon: Solomon Rose MD;  Location: [address];  Service: Urology;  Laterality: N/A;  3hrs    VASECTOMY       Patient Active Problem List   Diagnosis    Mixed hyperlipidemia    Essential hypertension    Family history of glaucoma    Age-related nuclear cataract of both eyes    Morbid obesity    Arthritis of knee    Seborrheic keratosis    Osteoarthritis of hand    Onychomycosis    Chronic allergic rhinitis    Prostate cancer    Multiple joint pain    GERD (gastroesophageal reflux disease)    Bilateral leg edema  "   PMR (polymyalgia rheumatica)    Current chronic use of systemic steroids    Normocytic anemia    Severe obesity (BMI >= 40)     Review of Systems      Objective:      There were no vitals taken for this visit.  Estimated body mass index is 39.46 kg/m² as calculated from the following:    Height as of 8/21/23: 5' 10" (1.778 m).    Weight as of 8/21/23: 124.7 kg (275 lb).  Physical Exam      Assessment and Plan:           Problem List Items Addressed This Visit       Prostate cancer - Primary       Follow up:   D/C tello  F/U 1 month with PSA in [place].  Jerry Rose          "

## 2023-08-29 ENCOUNTER — CLINICAL SUPPORT (OUTPATIENT)
Dept: UROLOGY | Facility: CLINIC | Age: 73
End: 2023-08-29
Payer: MEDICARE

## 2023-08-29 VITALS
SYSTOLIC BLOOD PRESSURE: 119 MMHG | HEIGHT: 70 IN | DIASTOLIC BLOOD PRESSURE: 73 MMHG | HEART RATE: 80 BPM | WEIGHT: 268.94 LBS | BODY MASS INDEX: 38.5 KG/M2

## 2023-08-29 DIAGNOSIS — C61 PROSTATE CANCER: Primary | ICD-10-CM

## 2023-08-29 PROCEDURE — 99499 UNLISTED E&M SERVICE: CPT | Mod: HCNC,S$GLB,, | Performed by: UROLOGY

## 2023-08-29 PROCEDURE — 99999 PR PBB SHADOW E&M-EST. PATIENT-LVL III: ICD-10-PCS | Mod: PBBFAC,HCNC,, | Performed by: UROLOGY

## 2023-08-29 PROCEDURE — 99499 NO LOS: ICD-10-PCS | Mod: HCNC,S$GLB,, | Performed by: UROLOGY

## 2023-08-29 PROCEDURE — 99999 PR PBB SHADOW E&M-EST. PATIENT-LVL III: CPT | Mod: PBBFAC,HCNC,, | Performed by: UROLOGY

## 2023-08-29 NOTE — LETTER
August 29, 2023        Fidencio Cotto MD  [address] [address] [place] [id number]             [facility] - Urology [address] [place] [id number]  Phone: [phone]   Patient: Juan Shepherd   MR Number: [id number]   YOB: 1950   Date of Visit: 8/29/2023       Dear Dr. Cotto:    Thank you for referring Juan Shepherd to me for evaluation. Attached you will find relevant portions of my assessment and plan of care.    If you have questions, please do not hesitate to call me. I look forward to following Juan Shepherd along with you.    Sincerely,      Solomon Rose MD            CC    No Recipients    Enclosure

## 2023-08-29 NOTE — LETTER
August 29, 2023        [place]  [address] [address] [address]             [facility] - Urology [address] [place], [place] [address] [facility] [address]  Phone: [phone]   Patient: Juan Shepherd   MR Number: [id number]   YOB: 1950   Date of Visit: 8/29/2023       Dear Dr. Bernard:    Thank you for referring Juan Shepherd to me for evaluation. Attached you will find relevant portions of my assessment and plan of care.    If you have questions, please do not hesitate to call me. I look forward to following Juan Shepherd along with you.    Sincerely,      Solomon Rose MD            CC    No Recipients    Enclosure

## 2023-08-30 LAB
FINAL PATHOLOGIC DIAGNOSIS: NORMAL
Lab: NORMAL

## 2023-09-20 ENCOUNTER — OFFICE VISIT (OUTPATIENT)
Dept: RHEUMATOLOGY | Facility: CLINIC | Age: 73
End: 2023-09-20
Payer: MEDICARE

## 2023-09-20 ENCOUNTER — LAB VISIT (OUTPATIENT)
Dept: LAB | Facility: HOSPITAL | Age: 73
End: 2023-09-20
Attending: INTERNAL MEDICINE
Payer: MEDICARE

## 2023-09-20 VITALS
HEART RATE: 70 BPM | WEIGHT: 274.94 LBS | DIASTOLIC BLOOD PRESSURE: 82 MMHG | HEIGHT: 70 IN | RESPIRATION RATE: 18 BRPM | BODY MASS INDEX: 39.36 KG/M2 | SYSTOLIC BLOOD PRESSURE: 122 MMHG

## 2023-09-20 DIAGNOSIS — M35.3 PMR (POLYMYALGIA RHEUMATICA): ICD-10-CM

## 2023-09-20 DIAGNOSIS — M35.3 PMR (POLYMYALGIA RHEUMATICA): Primary | ICD-10-CM

## 2023-09-20 DIAGNOSIS — M15.4 EROSIVE OSTEOARTHRITIS OF BOTH HANDS: ICD-10-CM

## 2023-09-20 LAB
CRP SERPL-MCNC: 6 MG/L (ref 0–8.2)
ERYTHROCYTE [SEDIMENTATION RATE] IN BLOOD BY PHOTOMETRIC METHOD: 10 MM/HR (ref 0–23)

## 2023-09-20 PROCEDURE — 86704 HEP B CORE ANTIBODY TOTAL: CPT | Mod: HCNC | Performed by: INTERNAL MEDICINE

## 2023-09-20 PROCEDURE — 86706 HEP B SURFACE ANTIBODY: CPT | Mod: 91,HCNC | Performed by: INTERNAL MEDICINE

## 2023-09-20 PROCEDURE — 1160F RVW MEDS BY RX/DR IN RCRD: CPT | Mod: HCNC,CPTII,S$GLB, | Performed by: INTERNAL MEDICINE

## 2023-09-20 PROCEDURE — 3008F BODY MASS INDEX DOCD: CPT | Mod: HCNC,CPTII,S$GLB, | Performed by: INTERNAL MEDICINE

## 2023-09-20 PROCEDURE — 4010F ACE/ARB THERAPY RXD/TAKEN: CPT | Mod: HCNC,CPTII,S$GLB, | Performed by: INTERNAL MEDICINE

## 2023-09-20 PROCEDURE — 1111F DSCHRG MED/CURRENT MED MERGE: CPT | Mod: HCNC,CPTII,S$GLB, | Performed by: INTERNAL MEDICINE

## 2023-09-20 PROCEDURE — 99999 PR PBB SHADOW E&M-EST. PATIENT-LVL IV: ICD-10-PCS | Mod: PBBFAC,HCNC,, | Performed by: INTERNAL MEDICINE

## 2023-09-20 PROCEDURE — 1159F PR MEDICATION LIST DOCUMENTED IN MEDICAL RECORD: ICD-10-PCS | Mod: HCNC,CPTII,S$GLB, | Performed by: INTERNAL MEDICINE

## 2023-09-20 PROCEDURE — 85652 RBC SED RATE AUTOMATED: CPT | Mod: HCNC | Performed by: INTERNAL MEDICINE

## 2023-09-20 PROCEDURE — 99204 PR OFFICE/OUTPT VISIT, NEW, LEVL IV, 45-59 MIN: ICD-10-PCS | Mod: HCNC,S$GLB,, | Performed by: INTERNAL MEDICINE

## 2023-09-20 PROCEDURE — 1160F PR REVIEW ALL MEDS BY PRESCRIBER/CLIN PHARMACIST DOCUMENTED: ICD-10-PCS | Mod: HCNC,CPTII,S$GLB, | Performed by: INTERNAL MEDICINE

## 2023-09-20 PROCEDURE — 87340 HEPATITIS B SURFACE AG IA: CPT | Mod: HCNC | Performed by: INTERNAL MEDICINE

## 2023-09-20 PROCEDURE — 1126F AMNT PAIN NOTED NONE PRSNT: CPT | Mod: HCNC,CPTII,S$GLB, | Performed by: INTERNAL MEDICINE

## 2023-09-20 PROCEDURE — 86140 C-REACTIVE PROTEIN: CPT | Mod: HCNC | Performed by: INTERNAL MEDICINE

## 2023-09-20 PROCEDURE — 86480 TB TEST CELL IMMUN MEASURE: CPT | Mod: HCNC | Performed by: INTERNAL MEDICINE

## 2023-09-20 PROCEDURE — 3288F PR FALLS RISK ASSESSMENT DOCUMENTED: ICD-10-PCS | Mod: HCNC,CPTII,S$GLB, | Performed by: INTERNAL MEDICINE

## 2023-09-20 PROCEDURE — 1111F PR DISCHARGE MEDS RECONCILED W/ CURRENT OUTPATIENT MED LIST: ICD-10-PCS | Mod: HCNC,CPTII,S$GLB, | Performed by: INTERNAL MEDICINE

## 2023-09-20 PROCEDURE — 99204 OFFICE O/P NEW MOD 45 MIN: CPT | Mod: HCNC,S$GLB,, | Performed by: INTERNAL MEDICINE

## 2023-09-20 PROCEDURE — 1126F PR PAIN SEVERITY QUANTIFIED, NO PAIN PRESENT: ICD-10-PCS | Mod: HCNC,CPTII,S$GLB, | Performed by: INTERNAL MEDICINE

## 2023-09-20 PROCEDURE — 36415 COLL VENOUS BLD VENIPUNCTURE: CPT | Mod: HCNC | Performed by: INTERNAL MEDICINE

## 2023-09-20 PROCEDURE — 3288F FALL RISK ASSESSMENT DOCD: CPT | Mod: HCNC,CPTII,S$GLB, | Performed by: INTERNAL MEDICINE

## 2023-09-20 PROCEDURE — 3008F PR BODY MASS INDEX (BMI) DOCUMENTED: ICD-10-PCS | Mod: HCNC,CPTII,S$GLB, | Performed by: INTERNAL MEDICINE

## 2023-09-20 PROCEDURE — 3074F PR MOST RECENT SYSTOLIC BLOOD PRESSURE < 130 MM HG: ICD-10-PCS | Mod: HCNC,CPTII,S$GLB, | Performed by: INTERNAL MEDICINE

## 2023-09-20 PROCEDURE — 3079F DIAST BP 80-89 MM HG: CPT | Mod: HCNC,CPTII,S$GLB, | Performed by: INTERNAL MEDICINE

## 2023-09-20 PROCEDURE — 1101F PT FALLS ASSESS-DOCD LE1/YR: CPT | Mod: HCNC,CPTII,S$GLB, | Performed by: INTERNAL MEDICINE

## 2023-09-20 PROCEDURE — 86803 HEPATITIS C AB TEST: CPT | Mod: HCNC | Performed by: INTERNAL MEDICINE

## 2023-09-20 PROCEDURE — 83529 ASAY OF INTERLEUKIN-6 (IL-6): CPT | Mod: HCNC | Performed by: INTERNAL MEDICINE

## 2023-09-20 PROCEDURE — 1101F PR PT FALLS ASSESS DOC 0-1 FALLS W/OUT INJ PAST YR: ICD-10-PCS | Mod: HCNC,CPTII,S$GLB, | Performed by: INTERNAL MEDICINE

## 2023-09-20 PROCEDURE — 3074F SYST BP LT 130 MM HG: CPT | Mod: HCNC,CPTII,S$GLB, | Performed by: INTERNAL MEDICINE

## 2023-09-20 PROCEDURE — 99999 PR PBB SHADOW E&M-EST. PATIENT-LVL IV: CPT | Mod: PBBFAC,HCNC,, | Performed by: INTERNAL MEDICINE

## 2023-09-20 PROCEDURE — 3079F PR MOST RECENT DIASTOLIC BLOOD PRESSURE 80-89 MM HG: ICD-10-PCS | Mod: HCNC,CPTII,S$GLB, | Performed by: INTERNAL MEDICINE

## 2023-09-20 PROCEDURE — 1159F MED LIST DOCD IN RCRD: CPT | Mod: HCNC,CPTII,S$GLB, | Performed by: INTERNAL MEDICINE

## 2023-09-20 PROCEDURE — 4010F PR ACE/ARB THEARPY RXD/TAKEN: ICD-10-PCS | Mod: HCNC,CPTII,S$GLB, | Performed by: INTERNAL MEDICINE

## 2023-09-20 RX ORDER — DIPHENHYDRAMINE HYDROCHLORIDE 50 MG/ML
50 INJECTION INTRAMUSCULAR; INTRAVENOUS ONCE AS NEEDED
OUTPATIENT
Start: 2023-09-20

## 2023-09-20 RX ORDER — SODIUM CHLORIDE 0.9 % (FLUSH) 0.9 %
10 SYRINGE (ML) INJECTION
OUTPATIENT
Start: 2023-09-20

## 2023-09-20 RX ORDER — ACETAMINOPHEN 325 MG/1
650 TABLET ORAL
OUTPATIENT
Start: 2023-09-20

## 2023-09-20 RX ORDER — EPINEPHRINE 0.3 MG/.3ML
0.3 INJECTION SUBCUTANEOUS ONCE AS NEEDED
OUTPATIENT
Start: 2023-09-20

## 2023-09-20 RX ORDER — PREDNISONE 10 MG/1
10 TABLET ORAL DAILY
COMMUNITY
End: 2023-09-20

## 2023-09-20 RX ORDER — PREDNISONE 10 MG/1
10 TABLET ORAL DAILY
Qty: 30 TABLET | Refills: 3 | Status: SHIPPED | OUTPATIENT
Start: 2023-09-20 | End: 2023-10-24

## 2023-09-20 RX ORDER — DIPHENHYDRAMINE HYDROCHLORIDE 50 MG/ML
50 INJECTION INTRAMUSCULAR; INTRAVENOUS
OUTPATIENT
Start: 2023-09-20

## 2023-09-20 RX ORDER — HYDROXYCHLOROQUINE SULFATE 200 MG/1
200 TABLET, FILM COATED ORAL 2 TIMES DAILY
Qty: 60 TABLET | Refills: 6 | Status: SHIPPED | OUTPATIENT
Start: 2023-09-20 | End: 2024-03-20 | Stop reason: SDUPTHER

## 2023-09-20 RX ORDER — HEPARIN 100 UNIT/ML
500 SYRINGE INTRAVENOUS
OUTPATIENT
Start: 2023-09-20

## 2023-09-20 NOTE — PROGRESS NOTES
RHEUMATOLOGY CLINIC INITIAL VISIT    Reason for consult:-  Polymyalgia rheumatica  Chief complaints, HPI, ROS, EXAM, Assessment & Plans:-  Juan Ordonez a 72 y.o. pleasant male comes in with polymyalgia rheumatica.  Diagnosed with polymyalgia rheumatica by primary care physician when he had inflammatory pain around bilateral shoulders and hips with significantly elevated inflammatory markers.  100% resolution of pain and prolonged morning stiffness on prednisone 10 mg daily.  Pain and stiffness returning since running out of prednisone last week.  No symptoms of giant cell arteritis.  Longstanding history of bilateral hand joint pain which improved significantly with prednisone.  Rheumatological review of system negative otherwise.  Physical examination shows bilateral hand osteoarthritic deformities of D IP and PIP joints.  No deformity or abnormality of MCP joints.  Crepitus of large joints but no active synovitis.  No temporal artery tenderness.  No scalp tenderness.  1. PMR (polymyalgia rheumatica)    2. Erosive osteoarthritis of both hands      Problem List Items Addressed This Visit       PMR (polymyalgia rheumatica) - Primary    Relevant Medications    predniSONE (DELTASONE) 10 MG tablet    Other Relevant Orders    Sedimentation rate    C-Reactive Protein    INTERLEUKIN-6    Hepatitis B Core Antibody, Total    Hepatitis B Surface Ab, Qualitative    Hepatitis B Surface Antigen    Hepatitis C Antibody    Quantiferon Gold TB     Other Visit Diagnoses       Erosive osteoarthritis of both hands        Relevant Medications    hydroxychloroquine (PLAQUENIL) 200 mg tablet           Latest Reference Range & Units Most Recent   TBOI Screen Negative <1:80  Negative <1:80  7/7/23 11:23   CCP Antibodies <5.0 U/mL <0.5  7/7/23 11:23   Rheumatoid Factor 0.0 - 15.0 IU/mL <13.0  7/7/23 11:23      Latest Reference Range & Units 07/07/23 11:23   WBC 3.90 - 12.70 K/uL 7.59   RBC 4.60 - 6.20 M/uL 4.43 (L)   Hemoglobin 14.0 - 18.0  g/dL 13.5 (L)   Hematocrit 40.0 - 54.0 % 41.2   MCV 82 - 98 fL 93   MCH 27.0 - 31.0 pg 30.5   MCHC 32.0 - 36.0 g/dL 32.8   RDW 11.5 - 14.5 % 13.2   Platelets 150 - 450 K/uL 270   MPV 9.2 - 12.9 fL 9.7   Gran % 38.0 - 73.0 % 61.4   Lymph % 18.0 - 48.0 % 26.2   Mono % 4.0 - 15.0 % 9.6   Eosinophil % 0.0 - 8.0 % 2.0   Basophil % 0.0 - 1.9 % 0.5   Immature Granulocytes 0.0 - 0.5 % 0.3   Gran # (ANC) 1.8 - 7.7 K/uL 4.7   Lymph # 1.0 - 4.8 K/uL 2.0   Mono # 0.3 - 1.0 K/uL 0.7   Eos # 0.0 - 0.5 K/uL 0.2   Baso # 0.00 - 0.20 K/uL 0.04   Immature Grans (Abs) 0.00 - 0.04 K/uL 0.02   nRBC 0 /100 WBC 0   Differential Method  Automated   Sed Rate 0 - 23 mm/Hr 57 (H)   Sodium 136 - 145 mmol/L 140   Potassium 3.5 - 5.1 mmol/L 4.6   Chloride 95 - 110 mmol/L 102   CO2 23 - 29 mmol/L 26   Anion Gap 8 - 16 mmol/L 12   BUN 8 - 23 mg/dL 14   Creatinine 0.5 - 1.4 mg/dL 0.9   eGFR >60 mL/min/1.73 m^2 >60.0   Glucose 70 - 110 mg/dL 84   Calcium 8.7 - 10.5 mg/dL 9.7   Alkaline Phosphatase 55 - 135 U/L 90   PROTEIN TOTAL 6.0 - 8.4 g/dL 7.4   Albumin 3.5 - 5.2 g/dL 3.5   BILIRUBIN TOTAL 0.1 - 1.0 mg/dL 0.4   AST 10 - 40 U/L 18   ALT 10 - 44 U/L 23   CRP 0.0 - 8.2 mg/L 48.8 (H)   (L): Data is abnormally low  (H): Data is abnormally high     Latest Reference Range & Units 07/07/23 11:23 08/15/23 08:56   Sed Rate 0 - 23 mm/Hr 57 (H) 30 (H)   (H): Data is abnormally high     Latest Reference Range & Units 07/07/23 11:23 08/15/23 08:56 09/20/23 12:20   CRP 0.0 - 8.2 mg/L 48.8 (H) 6.7 6.0   (H): Data is abnormally high    Clinical picture highly suggestive of polymyalgia rheumatica.  Restart prednisone 10 mg daily.  Check serologies and inflammatory markers.  Consider starting Actemra if we could not taper prednisone in the next couple of months.  Start Plaquenil for erosive osteoarthritis.  Check hepatitis and TB prior to Actemra.  No symptoms of giant cell arteritis.  I have explained all of the above in detail and the patient understands all of  the current recommendation(s). I have answered all questions to the best of my ability and to their complete satisfaction.           # Follow up in about 3 months (around 2023).      Past Medical History:   Diagnosis Date    Arthritis     Cataract     Cough 2018    Duodenitis     EGD 2016 (see outside procedure 10/27/2016 under Media)    Elevated cholesterol     Ex-smoker     Gastritis     EGD 2016 (see outside procedure 10/27/2016 under Media)    GERD (gastroesophageal reflux disease)     Hiatal hernia     EGD 2016 (see outside procedure 10/27/2016 under Media)    Hypertension     Morbid obesity     Normocytic anemia 2023    Pre-operative clearance 2018    Reflux esophagitis     EGD 2016 (see outside procedure 10/27/2016 under Media)    Tinnitus     and hL eval by ent diana       Past Surgical History:   Procedure Laterality Date    CHOLECYSTECTOMY      COLONOSCOPY      FINGER SURGERY      HAND SURGERY      HERNIA REPAIR      LYMPHADENECTOMY, PELVIS Bilateral 2023    Procedure: LYMPHADENECTOMY, PELVIS;  Surgeon: Solomon Rose MD;  Location: [facility] OR [address] [place] [address];  Service: Urology;  Laterality: Bilateral;    ROBOT-ASSISTED LAPAROSCOPIC PROSTATECTOMY USING DA LORRAINE XI N/A 2023    Procedure: XI ROBOTIC PROSTATECTOMY;  Surgeon: Solomon Rose MD;  Location: [facility] OR [address] [place] [address];  Service: Urology;  Laterality: N/A;  3hrs    VASECTOMY          Social History     Tobacco Use    Smoking status: Former     Current packs/day: 0.00     Average packs/day: 0.5 packs/day for 6.0 years (3.0 ttl pk-yrs)     Types: Cigarettes     Start date:      Quit date:      Years since quittin.7    Smokeless tobacco: Never    Tobacco comments:     light smoker quit over 36 y/   Substance Use Topics    Alcohol use: Yes     Alcohol/week: 1.0 - 2.0 standard drink of alcohol     Types: 1 - 2 Shots of liquor per week     Comment: occasionally    Drug use: No       Family History    Problem Relation Age of Onset    Glaucoma Mother     Pulmonary embolism Mother     Dementia Father     Anxiety disorder Father     Post-traumatic stress disorder Father     Restless legs syndrome Sister     Edema Sister     Polymyalgia rheumatica Brother     Clotting disorder Brother     Skin cancer Daughter     No Known Problems Son     No Known Problems Brother     No Known Problems Sister        Review of patient's allergies indicates:   Allergen Reactions    Sulfamethoxazole-trimethoprim Rash     Other reaction(s): Hives       Medication List with Changes/Refills   New Medications    HYDROXYCHLOROQUINE (PLAQUENIL) 200 MG TABLET    Take 1 tablet (200 mg total) by mouth 2 (two) times daily.    PREDNISONE (DELTASONE) 10 MG TABLET    Take 1 tablet (10 mg total) by mouth once daily.   Current Medications    AMLODIPINE (NORVASC) 10 MG TABLET    Take 1 tablet (10 mg total) by mouth once daily.    ESOMEPRAZOLE (NEXIUM) 40 MG CAPSULE    Take 1 capsule (40 mg total) by mouth once daily.    LISINOPRIL-HYDROCHLOROTHIAZIDE (PRINZIDE,ZESTORETIC) 20-12.5 MG PER TABLET    Take 1 tablet by mouth once daily.    OXYCODONE (ROXICODONE) 5 MG IMMEDIATE RELEASE TABLET    Take 1 tablet (5 mg total) by mouth every 4 (four) hours as needed for Pain.    SIMVASTATIN (ZOCOR) 40 MG TABLET    Take 1 tablet (40 mg total) by mouth every evening.   Discontinued Medications    PREDNISONE (DELTASONE) 10 MG TABLET    Take 10 mg by mouth once daily.         Thank you for allowing me to participate in the care ofJuan Shepherd.    Disclaimer: This note was prepared using voice recognition system and is likely to have sound alike errors and is not proof read.  Please call me with any questions.

## 2023-09-21 LAB
GAMMA INTERFERON BACKGROUND BLD IA-ACNC: 0.14 IU/ML
HBV CORE AB SERPL QL IA: NORMAL
HBV SURFACE AB SER-ACNC: <3 MIU/ML
HBV SURFACE AB SER-ACNC: NORMAL M[IU]/ML
HBV SURFACE AG SERPL QL IA: NORMAL
HCV AB SERPL QL IA: NORMAL
M TB IFN-G CD4+ BCKGRND COR BLD-ACNC: 0.02 IU/ML
M TB IFN-G CD4+ BCKGRND COR BLD-ACNC: 0.03 IU/ML
MITOGEN IGNF BCKGRD COR BLD-ACNC: 9.87 IU/ML
TB GOLD PLUS: NEGATIVE

## 2023-09-21 NOTE — PROGRESS NOTES
Normal values including inflammation markers.  Reduce prednisone to half a tablet of 10 mg-5 mg once daily and repeat labs in 4 weeks.  Based on the values, I do not think that you would need infusion therapy.

## 2023-09-22 LAB — IL6 SERPL-MCNC: 6 PG/ML

## 2023-09-27 ENCOUNTER — LAB VISIT (OUTPATIENT)
Dept: LAB | Facility: HOSPITAL | Age: 73
End: 2023-09-27
Attending: UROLOGY
Payer: MEDICARE

## 2023-09-27 DIAGNOSIS — C61 PROSTATE CANCER: ICD-10-CM

## 2023-09-27 LAB — COMPLEXED PSA SERPL-MCNC: <0.01 NG/ML (ref 0–4)

## 2023-09-27 PROCEDURE — 36415 COLL VENOUS BLD VENIPUNCTURE: CPT | Mod: HCNC,PO | Performed by: UROLOGY

## 2023-09-27 PROCEDURE — 84153 ASSAY OF PSA TOTAL: CPT | Mod: HCNC | Performed by: UROLOGY

## 2023-09-28 ENCOUNTER — OFFICE VISIT (OUTPATIENT)
Dept: UROLOGY | Facility: CLINIC | Age: 73
End: 2023-09-28
Payer: MEDICARE

## 2023-09-28 DIAGNOSIS — C61 PROSTATE CANCER: Primary | ICD-10-CM

## 2023-09-28 PROCEDURE — 99499 UNLISTED E&M SERVICE: CPT | Mod: HCNC,95,, | Performed by: UROLOGY

## 2023-09-28 PROCEDURE — 99499 NO LOS: ICD-10-PCS | Mod: HCNC,95,, | Performed by: UROLOGY

## 2023-09-28 RX ORDER — TADALAFIL 5 MG/1
5 TABLET ORAL DAILY PRN
Qty: 90 TABLET | Refills: 3 | Status: SHIPPED | OUTPATIENT
Start: 2023-09-28 | End: 2024-02-07

## 2023-09-28 NOTE — PROGRESS NOTES
Clinic Note  9/28/2023      Subjective:         Chief Complaint:   HPI  Juan Shepherd is a 72 y.o. male diagnosed with prostate cancer. Consult from Dr Cotto.  Co-morbidities- morbid obesity, HTN, polymyalgia rheumatica.  Lives in [place], retired from [employer]. Here with his wife and daughter.  Has lost 30 pounds on diet since January.  PSH: lap liz, lap umbilical repair.     FH -negative  PSA - 4.6  AJCC Stage - T1C  STAR CAP stage- IIC  Volume - 49 ccs  MRI - 4/13/2023-(1.5T) 0.8 RAPZ PI-RADS 4,MRI negative for EPE (extraprostatic extension), NVBI (neurovascular bundle involvement), SVI (seminal vesicle involvement), or nodes. To my review lesion extends from apex to right mid gland with the largest component at left mid. Borderline PI-RADS 5 with coronal length (1.5 cm).  Biopsy - 5/30/2023- Prem 4+3 (GG3) right base 2/2 5%; [place] 3+4 right middle 2/2 40%, left apex 2/2 15%; [place] 3+3 left base 2/2 5%, left middle 2/2 10%. Overall 5/6 sites, 10/12 cores.  SHAMIR Score - 5 intermediate  NCCN - unfavorable intermediate  Germline testing - not indicated  Somatic testing - discussed  STAR CAP-     8/29/2023- RALP (robotic assisted laparoscopic prostatectomy) 8/21/2023, path pending. Cystogram yesterday showed no leak. Will D/C tello today.  Normal diet, bowel movements, ambulation.      9/28/2023- PSA <0.01. RALP (robotic assisted laparoscopic prostatectomy) on 8/21/2023, path: Prem 3+4 (GG2) [id number] (+capsule, negative margin). Continent and pad free. Has not used any pads for 3 weeks. Zero opioids used postop.  The patient location is: home  The chief complaint leading to consultation is: above    Visit type: audiovisual    Face to Face time with patient: 15  10 minutes of total time spent on the encounter, which includes face to face time and non-face to face time preparing to see the patient (eg, review of tests), Obtaining and/or reviewing separately obtained history, Documenting clinical information in  the electronic or other health record, Independently interpreting results (not separately reported) and communicating results to the patient/family/caregiver, or Care coordination (not separately reported).         Each patient to whom he or she provides medical services by telemedicine is:  (1) informed of the relationship between the physician and patient and the respective role of any other health care provider with respect to management of the patient; and (2) notified that he or she may decline to receive medical services by telemedicine and may withdraw from such care at any time.    Notes:        Lab Results   Component Value Date    PSA 4.6 (H) 02/02/2023    PSA 3.9 05/27/2021    PSA 2.7 01/31/2020    PSA 2.2 01/31/2019    PSA 1.9 01/24/2018    PSA 2.2 12/08/2016    PSA 1.8 11/19/2015    PSA 1.6 12/03/2014    PSADIAG <0.01 09/27/2023    PSADIAG 4.1 (H) 03/21/2023      Past Medical History:   Diagnosis Date    Arthritis     Cataract     Cough 01/30/2018    Duodenitis     EGD 8/19/2016 (see outside procedure 10/27/2016 under Media)    Elevated cholesterol     Ex-smoker     Gastritis     EGD 8/19/2016 (see outside procedure 10/27/2016 under Media)    GERD (gastroesophageal reflux disease)     Hiatal hernia     EGD 8/19/2016 (see outside procedure 10/27/2016 under Media)    Hypertension     Morbid obesity     Normocytic anemia 8/16/2023    Pre-operative clearance 07/30/2018    Reflux esophagitis     EGD 8/19/2016 (see outside procedure 10/27/2016 under Media)    Tinnitus     and hL eval by ent diana     Family History   Problem Relation Age of Onset    Glaucoma Mother     Pulmonary embolism Mother     Dementia Father     Anxiety disorder Father     Post-traumatic stress disorder Father     Restless legs syndrome Sister     Edema Sister     Polymyalgia rheumatica Brother     Clotting disorder Brother     Skin cancer Daughter     No Known Problems Son     No Known Problems Brother     No Known Problems Sister       Social History     Socioeconomic History    Marital status:     Number of children: 2   Tobacco Use    Smoking status: Former     Current packs/day: 0.00     Average packs/day: 0.5 packs/day for 6.0 years (3.0 ttl pk-yrs)     Types: Cigarettes     Start date:      Quit date:      Years since quittin.7    Smokeless tobacco: Never    Tobacco comments:     light smoker quit over 36 y/   Substance and Sexual Activity    Alcohol use: Yes     Alcohol/week: 1.0 - 2.0 standard drink of alcohol     Types: 1 - 2 Shots of liquor per week     Comment: occasionally    Drug use: No    Sexual activity: Yes     Partners: Female   Social History Narrative        Brother dee dee clark    Retired [employer]     Social Determinants of Health     Financial Resource Strain: Low Risk  (4/3/2023)    Overall Financial Resource Strain (CARDIA)     Difficulty of Paying Living Expenses: Not hard at all   Food Insecurity: No Food Insecurity (4/3/2023)    Hunger Vital Sign     Worried About Running Out of Food in the Last Year: Never true     Ran Out of Food in the Last Year: Never true   Transportation Needs: No Transportation Needs (4/3/2023)    PRAPARE - Transportation     Lack of Transportation (Medical): No     Lack of Transportation (Non-Medical): No   Physical Activity: Unknown (4/3/2023)    Exercise Vital Sign     Days of Exercise per Week: 0 days   Stress: No Stress Concern Present (4/3/2023)    [place] [employer] - Occupational Stress Questionnaire     Feeling of Stress : Not at all   Social Connections: Socially Integrated (4/3/2023)    Social Connection and Isolation Panel [NHANES]     Frequency of Communication with Friends and Family: More than three times a week     Frequency of Social Gatherings with Friends and Family: Once a week     Attends Pentecostal Services: More than 4 times per year     Active Member of Clubs or Organizations: Yes     Attends Club or Organization  "Meetings: More than 4 times per year     Marital Status:    Housing Stability: Low Risk  (4/3/2023)    Housing Stability Vital Sign     Unable to Pay for Housing in the Last Year: No     Number of Places Lived in the Last Year: 1     Unstable Housing in the Last Year: No     Past Surgical History:   Procedure Laterality Date    CHOLECYSTECTOMY      COLONOSCOPY      FINGER SURGERY      HAND SURGERY      HERNIA REPAIR      LYMPHADENECTOMY, PELVIS Bilateral 8/21/2023    Procedure: LYMPHADENECTOMY, PELVIS;  Surgeon: Solomon Rose MD;  Location: [facility] OR [address] [place] [address];  Service: Urology;  Laterality: Bilateral;    ROBOT-ASSISTED LAPAROSCOPIC PROSTATECTOMY USING DA LORRAINE XI N/A 8/21/2023    Procedure: XI ROBOTIC PROSTATECTOMY;  Surgeon: Solomon Rose MD;  Location: [facility] OR [address] [place] [address];  Service: Urology;  Laterality: N/A;  3hrs    VASECTOMY       Patient Active Problem List   Diagnosis    Mixed hyperlipidemia    Essential hypertension    Family history of glaucoma    Age-related nuclear cataract of both eyes    Morbid obesity    Arthritis of knee    Seborrheic keratosis    Osteoarthritis of hand    Onychomycosis    Chronic allergic rhinitis    Prostate cancer    Multiple joint pain    GERD (gastroesophageal reflux disease)    Bilateral leg edema    PMR (polymyalgia rheumatica)    Current chronic use of systemic steroids    Normocytic anemia    Severe obesity (BMI >= 40)     Review of Systems      Objective:      There were no vitals taken for this visit.  Estimated body mass index is 39.45 kg/m² as calculated from the following:    Height as of 9/20/23: 5' 10" (1.778 m).    Weight as of 9/20/23: 124.7 kg (274 lb 14.6 oz).  Physical Exam      Assessment and Plan:           Problem List Items Addressed This Visit       Prostate cancer - Primary       Follow up:   Discussed penile rehab. Called in Cialis 5 mg daily.  6 months with PSA.    Solomon Rose          "

## 2023-10-13 ENCOUNTER — PATIENT OUTREACH (OUTPATIENT)
Dept: ADMINISTRATIVE | Facility: HOSPITAL | Age: 73
End: 2023-10-13
Payer: MEDICARE

## 2023-10-13 NOTE — PROGRESS NOTES
COLON CANCER SCREEN/[employer] fit kit: per pt chart pt is due for colon ca screen, attempted to call pt to verify if he has had with [employer], no ans, lvm.  Will request from [facility].

## 2023-10-20 ENCOUNTER — LAB VISIT (OUTPATIENT)
Dept: LAB | Facility: HOSPITAL | Age: 73
End: 2023-10-20
Attending: UROLOGY
Payer: MEDICARE

## 2023-10-20 DIAGNOSIS — M35.3 PMR (POLYMYALGIA RHEUMATICA): ICD-10-CM

## 2023-10-20 LAB
CRP SERPL-MCNC: 5.3 MG/L (ref 0–8.2)
ERYTHROCYTE [SEDIMENTATION RATE] IN BLOOD BY PHOTOMETRIC METHOD: 7 MM/HR (ref 0–23)

## 2023-10-20 PROCEDURE — 85652 RBC SED RATE AUTOMATED: CPT | Mod: HCNC | Performed by: INTERNAL MEDICINE

## 2023-10-20 PROCEDURE — 36415 COLL VENOUS BLD VENIPUNCTURE: CPT | Mod: HCNC,PO | Performed by: INTERNAL MEDICINE

## 2023-10-20 PROCEDURE — 86140 C-REACTIVE PROTEIN: CPT | Mod: HCNC,PO | Performed by: INTERNAL MEDICINE

## 2023-10-24 ENCOUNTER — PATIENT MESSAGE (OUTPATIENT)
Dept: RHEUMATOLOGY | Facility: CLINIC | Age: 73
End: 2023-10-24
Payer: MEDICARE

## 2023-10-24 DIAGNOSIS — M35.3 PMR (POLYMYALGIA RHEUMATICA): ICD-10-CM

## 2023-10-24 RX ORDER — PREDNISONE 5 MG/1
TABLET ORAL
Qty: 90 TABLET | Refills: 0 | Status: SHIPPED | OUTPATIENT
Start: 2023-10-24 | End: 2024-02-07 | Stop reason: SDUPTHER

## 2023-10-24 NOTE — TELEPHONE ENCOUNTER
Inflammation level shows stable results.  Reduce prednisone as advised in the revised prescription sent today.

## 2023-11-20 ENCOUNTER — LAB VISIT (OUTPATIENT)
Dept: LAB | Facility: HOSPITAL | Age: 73
End: 2023-11-20
Attending: UROLOGY
Payer: MEDICARE

## 2023-11-20 DIAGNOSIS — M35.3 PMR (POLYMYALGIA RHEUMATICA): ICD-10-CM

## 2023-11-20 LAB — CRP SERPL-MCNC: 5.4 MG/L (ref 0–8.2)

## 2023-11-20 PROCEDURE — 36415 COLL VENOUS BLD VENIPUNCTURE: CPT | Mod: HCNC,PO | Performed by: INTERNAL MEDICINE

## 2023-11-20 PROCEDURE — 85652 RBC SED RATE AUTOMATED: CPT | Mod: HCNC | Performed by: INTERNAL MEDICINE

## 2023-11-20 PROCEDURE — 86140 C-REACTIVE PROTEIN: CPT | Mod: HCNC,PO | Performed by: INTERNAL MEDICINE

## 2023-11-21 LAB — ERYTHROCYTE [SEDIMENTATION RATE] IN BLOOD BY PHOTOMETRIC METHOD: 21 MM/HR (ref 0–23)

## 2023-12-07 ENCOUNTER — PATIENT MESSAGE (OUTPATIENT)
Dept: RHEUMATOLOGY | Facility: CLINIC | Age: 73
End: 2023-12-07
Payer: MEDICARE

## 2024-01-30 ENCOUNTER — OFFICE VISIT (OUTPATIENT)
Dept: RHEUMATOLOGY | Facility: CLINIC | Age: 74
End: 2024-01-30
Payer: MEDICARE

## 2024-01-30 ENCOUNTER — LAB VISIT (OUTPATIENT)
Dept: LAB | Facility: HOSPITAL | Age: 74
End: 2024-01-30
Attending: INTERNAL MEDICINE
Payer: MEDICARE

## 2024-01-30 VITALS
DIASTOLIC BLOOD PRESSURE: 78 MMHG | WEIGHT: 277.75 LBS | HEART RATE: 69 BPM | SYSTOLIC BLOOD PRESSURE: 120 MMHG | BODY MASS INDEX: 39.86 KG/M2

## 2024-01-30 DIAGNOSIS — M35.3 PMR (POLYMYALGIA RHEUMATICA): Primary | ICD-10-CM

## 2024-01-30 DIAGNOSIS — M15.4 EROSIVE OSTEOARTHRITIS OF BOTH HANDS: ICD-10-CM

## 2024-01-30 DIAGNOSIS — Z79.52 LONG TERM (CURRENT) USE OF SYSTEMIC STEROIDS: ICD-10-CM

## 2024-01-30 DIAGNOSIS — M35.3 PMR (POLYMYALGIA RHEUMATICA): ICD-10-CM

## 2024-01-30 LAB
CRP SERPL-MCNC: 4.8 MG/L (ref 0–8.2)
ERYTHROCYTE [SEDIMENTATION RATE] IN BLOOD BY PHOTOMETRIC METHOD: 20 MM/HR (ref 0–23)

## 2024-01-30 PROCEDURE — 1159F MED LIST DOCD IN RCRD: CPT | Mod: HCNC,CPTII,S$GLB, | Performed by: INTERNAL MEDICINE

## 2024-01-30 PROCEDURE — 36415 COLL VENOUS BLD VENIPUNCTURE: CPT | Mod: HCNC | Performed by: INTERNAL MEDICINE

## 2024-01-30 PROCEDURE — 86140 C-REACTIVE PROTEIN: CPT | Mod: HCNC | Performed by: INTERNAL MEDICINE

## 2024-01-30 PROCEDURE — 1126F AMNT PAIN NOTED NONE PRSNT: CPT | Mod: HCNC,CPTII,S$GLB, | Performed by: INTERNAL MEDICINE

## 2024-01-30 PROCEDURE — 3074F SYST BP LT 130 MM HG: CPT | Mod: HCNC,CPTII,S$GLB, | Performed by: INTERNAL MEDICINE

## 2024-01-30 PROCEDURE — 99999 PR PBB SHADOW E&M-EST. PATIENT-LVL III: CPT | Mod: PBBFAC,HCNC,, | Performed by: INTERNAL MEDICINE

## 2024-01-30 PROCEDURE — 4010F ACE/ARB THERAPY RXD/TAKEN: CPT | Mod: HCNC,CPTII,S$GLB, | Performed by: INTERNAL MEDICINE

## 2024-01-30 PROCEDURE — 3288F FALL RISK ASSESSMENT DOCD: CPT | Mod: HCNC,CPTII,S$GLB, | Performed by: INTERNAL MEDICINE

## 2024-01-30 PROCEDURE — 85652 RBC SED RATE AUTOMATED: CPT | Mod: HCNC | Performed by: INTERNAL MEDICINE

## 2024-01-30 PROCEDURE — 1160F RVW MEDS BY RX/DR IN RCRD: CPT | Mod: HCNC,CPTII,S$GLB, | Performed by: INTERNAL MEDICINE

## 2024-01-30 PROCEDURE — 1101F PT FALLS ASSESS-DOCD LE1/YR: CPT | Mod: HCNC,CPTII,S$GLB, | Performed by: INTERNAL MEDICINE

## 2024-01-30 PROCEDURE — 3078F DIAST BP <80 MM HG: CPT | Mod: HCNC,CPTII,S$GLB, | Performed by: INTERNAL MEDICINE

## 2024-01-30 PROCEDURE — 99215 OFFICE O/P EST HI 40 MIN: CPT | Mod: HCNC,S$GLB,, | Performed by: INTERNAL MEDICINE

## 2024-01-30 PROCEDURE — 3008F BODY MASS INDEX DOCD: CPT | Mod: HCNC,CPTII,S$GLB, | Performed by: INTERNAL MEDICINE

## 2024-01-30 RX ORDER — PREDNISONE 5 MG/1
5 TABLET ORAL DAILY
Qty: 30 TABLET | Refills: 0 | Status: SHIPPED | OUTPATIENT
Start: 2024-01-30 | End: 2024-02-07 | Stop reason: SDUPTHER

## 2024-01-30 RX ORDER — PREDNISONE 5 MG/1
5 TABLET ORAL DAILY
Qty: 90 TABLET | Refills: 1 | Status: SHIPPED | OUTPATIENT
Start: 2024-01-30

## 2024-01-30 NOTE — PROGRESS NOTES
RHEUMATOLOGY CLINIC FOLLOW UP VISIT  Chief complaints, HPI, ROS, EXAM, Assessment & Plans:-  Juan Ordonez a 73 y.o. pleasant male comes in for follow-up visit.  Acute onset polymyalgia rheumatica and erosive osteoarthritis here for follow-up.  100% resolution PMR symptoms on prednisone taper.  Presently on 5 mg daily without any recurrence.  No symptoms of GCA.  Making a better fist on Plaquenil in terms of erosive osteoarthritis.  No significant adverse effects.  Has chronic deformities of thumb joints.  Rheumatological review of system negative otherwise.  Exam shows chronic deformities but no active synovitis.  No synovitis or effusion of large joints..    1. PMR (polymyalgia rheumatica)    2. Erosive osteoarthritis of both hands    3. Long term (current) use of systemic steroids      Problem List Items Addressed This Visit       PMR (polymyalgia rheumatica) - Primary    Relevant Medications    predniSONE (DELTASONE) 5 MG tablet    predniSONE (DELTASONE) 5 MG tablet    Long term (current) use of systemic steroids    Relevant Orders    DXA Bone Density Axial Skeleton 1 or more sites (Completed)    Erosive osteoarthritis of both hands    Relevant Medications    predniSONE (DELTASONE) 5 MG tablet    Other Relevant Orders    Ambulatory referral/consult to Orthopedics       Labs reviewed today:-   Latest Reference Range & Units Most Recent   TOBI Screen Negative <1:80  Negative <1:80  7/7/23 11:23   CCP Antibodies <5.0 U/mL <0.5  7/7/23 11:23   Interleukin 6 <6.4 pg/mL 6.0  9/20/23 12:20   Rheumatoid Factor 0.0 - 15.0 IU/mL <13.0  7/7/23 11:23        Latest Reference Range & Units 11/20/23 08:19   Sed Rate 0 - 23 mm/Hr 21      Latest Reference Range & Units 01/30/24 10:44   CRP 0.0 - 8.2 mg/L 4.8         Well controlled polymyalgia rheumatica on prednisone taper.  Reduce prednisone further as advised.  Check DEXA for long-term steroid use  Significant improvement of  synovitis associated with erosive osteoarthritis on Plaquenil.  Continue.  Consult orthopedic surgeon for finger deformities now that the synovitis have resolved.  Plaquenil screen with ophthalmologist yearly.  I have explained all of the above in detail and the patient understands all of the current recommendation(s). I have answered all questions to the best of my ability and to their complete satisfaction.       Total time spent 40 minutes including time needed to review the records, the   patient evaluation, documentation, face-to-face discussion with the patient,   coordination of care and counseling.    Level V visit.  # Follow up in about 6 months (around 8/14/2024).      Disclaimer: This note was prepared using voice recognition system and is likely to have sound alike errors and is not proof read.  Please call me with any questions.

## 2024-02-02 DIAGNOSIS — M79.641 BILATERAL HAND PAIN: Primary | ICD-10-CM

## 2024-02-02 DIAGNOSIS — M79.642 BILATERAL HAND PAIN: Primary | ICD-10-CM

## 2024-02-05 ENCOUNTER — HOSPITAL ENCOUNTER (OUTPATIENT)
Dept: RADIOLOGY | Facility: HOSPITAL | Age: 74
Discharge: HOME | End: 2024-02-05
Attending: STUDENT IN AN ORGANIZED HEALTH CARE EDUCATION/TRAINING PROGRAM
Payer: MEDICARE

## 2024-02-05 ENCOUNTER — OFFICE VISIT (OUTPATIENT)
Dept: ORTHOPEDICS | Facility: CLINIC | Age: 74
End: 2024-02-05
Payer: MEDICARE

## 2024-02-05 VITALS — HEIGHT: 70 IN | BODY MASS INDEX: 39.76 KG/M2 | WEIGHT: 277.75 LBS

## 2024-02-05 DIAGNOSIS — M79.641 BILATERAL HAND PAIN: ICD-10-CM

## 2024-02-05 DIAGNOSIS — M65.332 TRIGGER MIDDLE FINGER OF LEFT HAND: Primary | ICD-10-CM

## 2024-02-05 DIAGNOSIS — M79.642 BILATERAL HAND PAIN: ICD-10-CM

## 2024-02-05 DIAGNOSIS — M15.8 DEGENERATIVE ARTHRITIS OF INTERPHALANGEAL JOINT OF RIGHT THUMB: ICD-10-CM

## 2024-02-05 DIAGNOSIS — M15.4 EROSIVE OSTEOARTHRITIS OF BOTH HANDS: ICD-10-CM

## 2024-02-05 DIAGNOSIS — M65.342 TRIGGER RING FINGER OF LEFT HAND: ICD-10-CM

## 2024-02-05 DIAGNOSIS — M65.331 TRIGGER MIDDLE FINGER OF RIGHT HAND: ICD-10-CM

## 2024-02-05 PROCEDURE — 73130 X-RAY EXAM OF HAND: CPT | Mod: 26,50,HCNC, | Performed by: RADIOLOGY

## 2024-02-05 PROCEDURE — 20550 NJX 1 TENDON SHEATH/LIGAMENT: CPT | Mod: HCNC,50,S$GLB, | Performed by: STUDENT IN AN ORGANIZED HEALTH CARE EDUCATION/TRAINING PROGRAM

## 2024-02-05 PROCEDURE — 3288F FALL RISK ASSESSMENT DOCD: CPT | Mod: HCNC,CPTII,S$GLB, | Performed by: STUDENT IN AN ORGANIZED HEALTH CARE EDUCATION/TRAINING PROGRAM

## 2024-02-05 PROCEDURE — 73130 X-RAY EXAM OF HAND: CPT | Mod: TC,50,HCNC

## 2024-02-05 PROCEDURE — 1160F RVW MEDS BY RX/DR IN RCRD: CPT | Mod: HCNC,CPTII,S$GLB, | Performed by: STUDENT IN AN ORGANIZED HEALTH CARE EDUCATION/TRAINING PROGRAM

## 2024-02-05 PROCEDURE — 99204 OFFICE O/P NEW MOD 45 MIN: CPT | Mod: 25,HCNC,S$GLB, | Performed by: STUDENT IN AN ORGANIZED HEALTH CARE EDUCATION/TRAINING PROGRAM

## 2024-02-05 PROCEDURE — 1159F MED LIST DOCD IN RCRD: CPT | Mod: HCNC,CPTII,S$GLB, | Performed by: STUDENT IN AN ORGANIZED HEALTH CARE EDUCATION/TRAINING PROGRAM

## 2024-02-05 PROCEDURE — 20550 NJX 1 TENDON SHEATH/LIGAMENT: CPT | Mod: HCNC,51,XS,LT | Performed by: STUDENT IN AN ORGANIZED HEALTH CARE EDUCATION/TRAINING PROGRAM

## 2024-02-05 PROCEDURE — 1101F PT FALLS ASSESS-DOCD LE1/YR: CPT | Mod: HCNC,CPTII,S$GLB, | Performed by: STUDENT IN AN ORGANIZED HEALTH CARE EDUCATION/TRAINING PROGRAM

## 2024-02-05 PROCEDURE — 4010F ACE/ARB THERAPY RXD/TAKEN: CPT | Mod: HCNC,CPTII,S$GLB, | Performed by: STUDENT IN AN ORGANIZED HEALTH CARE EDUCATION/TRAINING PROGRAM

## 2024-02-05 PROCEDURE — 3008F BODY MASS INDEX DOCD: CPT | Mod: HCNC,CPTII,S$GLB, | Performed by: STUDENT IN AN ORGANIZED HEALTH CARE EDUCATION/TRAINING PROGRAM

## 2024-02-05 PROCEDURE — 99999 PR PBB SHADOW E&M-EST. PATIENT-LVL III: CPT | Mod: PBBFAC,HCNC,, | Performed by: STUDENT IN AN ORGANIZED HEALTH CARE EDUCATION/TRAINING PROGRAM

## 2024-02-05 PROCEDURE — 1126F AMNT PAIN NOTED NONE PRSNT: CPT | Mod: HCNC,CPTII,S$GLB, | Performed by: STUDENT IN AN ORGANIZED HEALTH CARE EDUCATION/TRAINING PROGRAM

## 2024-02-05 RX ORDER — AMOXICILLIN AND CLAVULANATE POTASSIUM 875; 125 MG/1; MG/1
TABLET, FILM COATED ORAL
COMMUNITY
Start: 2024-01-03 | End: 2024-02-07 | Stop reason: ALTCHOICE

## 2024-02-05 RX ORDER — TRIAMCINOLONE ACETONIDE 40 MG/ML
40 INJECTION, SUSPENSION INTRA-ARTICULAR; INTRAMUSCULAR
Status: DISCONTINUED | OUTPATIENT
Start: 2024-02-05 | End: 2024-02-05 | Stop reason: HOSPADM

## 2024-02-05 RX ADMIN — TRIAMCINOLONE ACETONIDE 40 MG: 40 INJECTION, SUSPENSION INTRA-ARTICULAR; INTRAMUSCULAR at 01:02

## 2024-02-05 NOTE — PROCEDURES
Right Middle Trigger Finger Injection    Date/Time: 2/5/2024 1:00 PM    Performed by: Hannah Pierce MD  Authorized by: Hannah Pierce MD    Consent Done?:  Yes (Verbal)  Indications:  Pain  Timeout: prior to procedure the correct patient, procedure, and site was verified    Local anesthesia used?: Yes    Anesthesia:  Local infiltration  Local anesthetic:  Lidocaine 1% without epinephrine  Anesthetic total (ml):  1    Location:  Long finger  Site:  R long flexor tendon sheath  Ultrasonic guidance for needle placement?: No    Needle size:  22 G  Approach:  Volar  Medications:  40 mg triamcinolone acetonide 40 mg/mL  Patient tolerance:  Patient tolerated the procedure well with no immediate complications

## 2024-02-05 NOTE — PROCEDURES
Left Ring Trigger Finger Injection    Date/Time: 2/5/2024 1:00 PM    Performed by: Hannah Pierce MD  Authorized by: Hannah Pierce MD    Consent Done?:  Yes (Verbal)  Indications:  Pain  Timeout: prior to procedure the correct patient, procedure, and site was verified    Local anesthesia used?: Yes    Anesthesia:  Local infiltration  Local anesthetic:  Lidocaine 1% without epinephrine  Anesthetic total (ml):  1    Location:  Ring finger  Site:  L ring flexor tendon sheath  Ultrasonic guidance for needle placement?: No    Needle size:  22 G  Approach:  Volar  Medications:  40 mg triamcinolone acetonide 40 mg/mL  Patient tolerance:  Patient tolerated the procedure well with no immediate complications

## 2024-02-05 NOTE — PROCEDURES
Left Middle Trigger Finger Injection    Date/Time: 2/5/2024 1:00 PM    Performed by: Hannah Pierce MD  Authorized by: Hannah Pierce MD    Consent Done?:  Yes (Verbal)  Indications:  Pain  Timeout: prior to procedure the correct patient, procedure, and site was verified    Local anesthesia used?: Yes    Anesthesia:  Local infiltration  Local anesthetic:  Lidocaine 1% without epinephrine  Location:  Long finger  Site:  L long flexor tendon sheath  Ultrasonic guidance for needle placement?: No    Needle size:  22 G  Approach:  Volar  Medications:  40 mg triamcinolone acetonide 40 mg/mL  Patient tolerance:  Patient tolerated the procedure well with no immediate complications

## 2024-02-05 NOTE — PROGRESS NOTES
Hand Surgery Clinic Note    Chief Complaint  Chief Complaint   Patient presents with    Right Hand - Pain, Swelling    Left Hand - Pain, Swelling       History of Present Illness  73-year-old right-hand dominant male who is retired presents for evaluation of bilateral hand pain.  He was known erosive arthritis throughout both of his hands.  Symptoms have taken place for over 4 years.  He has previously undergone a left CMC arthroplasty in 2013 and right CMC arthroplasty as well as Guyon's decompression in 2018 by Dr. Leroy.  He did well following these surgeries.  Additionally, he had surgery for a left thumb fracture in the 1970s.  He is currently being seen by Rheumatology for erosive arthritis throughout his hands.  Over the last few months, he has had significant pain at the right thumb IP joint but the pain has improved significantly since the interval development of auto fusion in which patient can no longer flex the thumb.  Additionally, he has developed triggering in the left middle finger, left ring finger, and right middle finger in the last few months.  He has never been treated for this before.  He has no numbness or tingling which wakes him up from sleep at night.  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    Review of Systems  Review of systems negative for chest pain, shortness of breath, fevers, chills, nausea/vomiting.    Past Medical History  Past Medical History:   Diagnosis Date    Arthritis     Cataract     Cough 01/30/2018    Duodenitis     EGD 8/19/2016 (see outside procedure 10/27/2016 under [facility])    Elevated cholesterol     Ex-smoker     Gastritis     EGD 8/19/2016 (see outside procedure 10/27/2016 under [facility])    GERD (gastroesophageal reflux disease)     Hiatal hernia     EGD 8/19/2016 (see outside procedure 10/27/2016 under [facility])    Hypertension     Morbid obesity     Normocytic anemia 8/16/2023    Pre-operative clearance 07/30/2018    Reflux esophagitis     EGD  2016 (see outside procedure 10/27/2016 under [facility])    Tinnitus     and hL eval by ent diana       Past Surgical History  Past Surgical History:   Procedure Laterality Date    CHOLECYSTECTOMY      COLONOSCOPY      FINGER SURGERY      HAND SURGERY      HERNIA REPAIR      LYMPHADENECTOMY, PELVIS Bilateral 2023    Procedure: LYMPHADENECTOMY, PELVIS;  Surgeon: Solomon Rose MD;  Location: [facility] OR [address] [place] [address];  Service: Urology;  Laterality: Bilateral;    ROBOT-ASSISTED LAPAROSCOPIC PROSTATECTOMY USING DA LORRAINE XI N/A 2023    Procedure: XI ROBOTIC PROSTATECTOMY;  Surgeon: Solomon Rose MD;  Location: [facility] OR [address] [place] [address];  Service: Urology;  Laterality: N/A;  3hrs    VASECTOMY         Allergies  Review of patient's allergies indicates:   Allergen Reactions    Sulfamethoxazole-trimethoprim Rash     Other reaction(s): Hives       Family History  Family History   Problem Relation Age of Onset    Glaucoma Mother     Pulmonary embolism Mother     Dementia Father     Anxiety disorder Father     Post-traumatic stress disorder Father     Restless legs syndrome Sister     Edema Sister     Polymyalgia rheumatica Brother     Clotting disorder Brother     Skin cancer Daughter     No Known Problems Son     No Known Problems Brother     No Known Problems Sister        Social History  Social History     Socioeconomic History    Marital status:     Number of children: 2   Tobacco Use    Smoking status: Former     Current packs/day: 0.00     Average packs/day: 0.5 packs/day for 6.0 years (3.0 ttl pk-yrs)     Types: Cigarettes     Start date:      Quit date:      Years since quittin.1     Passive exposure: Past    Smokeless tobacco: Never    Tobacco comments:     light smoker quit over 36 y/   Substance and Sexual Activity    Alcohol use: Yes     Alcohol/week: 1.0 - 2.0 standard drink of alcohol     Types: 1 - 2 Shots of liquor per week     Comment: occasionally    Drug use: No    Sexual activity:  Yes     Partners: Female   Social History Narrative        Brother dee dee clark    Retired [employer]     Social Determinants of Health     Financial Resource Strain: Low Risk  (4/3/2023)    Overall Financial Resource Strain (CARDIA)     Difficulty of Paying Living Expenses: Not hard at all   Food Insecurity: No Food Insecurity (2/5/2024)    Hunger Vital Sign     Worried About Running Out of Food in the Last Year: Never true     Ran Out of Food in the Last Year: Never true   Transportation Needs: No Transportation Needs (2/5/2024)    PRAPARE - Transportation     Lack of Transportation (Medical): No     Lack of Transportation (Non-Medical): No   Physical Activity: Insufficiently Active (2/5/2024)    Exercise Vital Sign     Days of Exercise per Week: 2 days     Minutes of Exercise per Session: 30 min   Stress: No Stress Concern Present (2/5/2024)    [employer] - Occupational Stress Questionnaire     Feeling of Stress : Only a little   Social Connections: Socially Integrated (2/5/2024)    Social Connection and Isolation Panel [NHANES]     Frequency of Communication with Friends and Family: Twice a week     Frequency of Social Gatherings with Friends and Family: Once a week     Attends Sikhism Services: More than 4 times per year     Active Member of Clubs or Organizations: Yes     Attends Club or Organization Meetings: More than 4 times per year     Marital Status:    Housing Stability: Low Risk  (2/5/2024)    Housing Stability Vital Sign     Unable to Pay for Housing in the Last Year: No     Number of Places Lived in the Last Year: 1     Unstable Housing in the Last Year: No       Vital Signs  There were no vitals filed for this visit.    Physical Exam  Constitutional: Appears well-developed and well-nourished. No distress.   HENT:   Head: Normocephalic.   Eyes: EOM are normal.   Pulmonary/Chest: Effort normal.   Neurological: Oriented to person, place, and time.    Psychiatric: Normal mood and affect.     Right Upper Extremity:  Well healed incisions from prior CMC arthroplasty surgery as well as Guyon's decompression surgery.  There is bony deformity/swelling at the DIPJ joints of all fingers as well as the thumb IP joint.  There is no motion at the thumb IP joint, it is held at 0°.  It was not tender at this time.  There is minimal motion, less than 10°, at the index/middle/small finger DIPJ joints.  No motion at the ring finger DIPJ joint, held at 0 degrees.  Patient has tenderness over the A1 pulley of the middle finger as well as triggering with range of motion.  No tenderness over the A1 pulleys of the other 4 fingers nor is there triggering with range of motion.  Sensation is intact in the median, radial, ulnar nerve distributions.  Palpable radial pulse.      Left upper extremity:   Well-healed incision from prior CMC arthroplasty surgery. There is minimal motion, less than 10°, at the DIPJ joints of the index, middle, ring, small fingers as well as the thumb IP joint.  Patient has tenderness over the A1 pulleys of the middle and ring fingers as well as triggering with range of motion.  No tenderness over the A1 pulleys of the other 3 fingers nor is there triggering with range of motion.  Palpable radial pulse.  Sensation is intact in the median, radial, ulnar nerve distributions.    Imaging  Bilateral hand x-rays three views were obtained today and independently reviewed by myself.  The right thumb IP joint has auto fused at this time in a neutral position.  There is severe arthritis noted at the DIPJ joints of the right index, middle, small fingers.  The right ring finger DIPJ joint has auto fused in neutral position.  Interval bilateral trapeziectomy.  There are severe arthritic changes noted at the DIPJ joints of the left index, middle, ring, small fingers as well as the thumb IP joint.    Assessment and Plan  73-year-old right-hand dominant male presents with  multiple issues today.  He is polymyalgia rheumatica as well as severe erosive arthritis in both hands primarily affecting the DIPJ joints as well as the IP joint of the thumb.  Previously, he had severe pain at the right thumb IP joint; he is now gone on to auto fuse at this joint and his pain has improved.  I discussed that the treatment for arthritis at this thumb joint is a fusion surgery which he has essentially developed on his own.  As for the severe arthritic changes in the DIPJ joints of all other fingers, they are only mildly painful at this time so we will hold off on any fusion procedure.  He additionally presents with left middle, left ring, and right middle trigger fingers.  I have recommended steroid injections today to see if this will improve or resolve his symptoms.  Patient agreed to proceed.  See procedure notes.  He tolerated the procedures well without complication.  Follow up in clinic as needed if symptoms recur or do not resolve.    Hannah Pierce MD  Orthopaedic Hand Surgery

## 2024-02-07 ENCOUNTER — LAB VISIT (OUTPATIENT)
Dept: LAB | Facility: HOSPITAL | Age: 74
End: 2024-02-07
Attending: NURSE PRACTITIONER
Payer: MEDICARE

## 2024-02-07 ENCOUNTER — OFFICE VISIT (OUTPATIENT)
Dept: INTERNAL MEDICINE | Facility: CLINIC | Age: 74
End: 2024-02-07
Payer: MEDICARE

## 2024-02-07 VITALS
BODY MASS INDEX: 39.96 KG/M2 | SYSTOLIC BLOOD PRESSURE: 106 MMHG | TEMPERATURE: 98 F | DIASTOLIC BLOOD PRESSURE: 72 MMHG | HEIGHT: 70 IN | RESPIRATION RATE: 16 BRPM | HEART RATE: 65 BPM | OXYGEN SATURATION: 98 % | WEIGHT: 279.13 LBS

## 2024-02-07 DIAGNOSIS — M35.3 PMR (POLYMYALGIA RHEUMATICA): Primary | ICD-10-CM

## 2024-02-07 DIAGNOSIS — Z12.11 COLON CANCER SCREENING: ICD-10-CM

## 2024-02-07 DIAGNOSIS — I10 ESSENTIAL HYPERTENSION: ICD-10-CM

## 2024-02-07 DIAGNOSIS — E78.2 MIXED HYPERLIPIDEMIA: ICD-10-CM

## 2024-02-07 DIAGNOSIS — Z79.899 DRUG-INDUCED IMMUNODEFICIENCY: ICD-10-CM

## 2024-02-07 DIAGNOSIS — C61 PROSTATE CANCER: ICD-10-CM

## 2024-02-07 DIAGNOSIS — K21.9 GASTROESOPHAGEAL REFLUX DISEASE, UNSPECIFIED WHETHER ESOPHAGITIS PRESENT: ICD-10-CM

## 2024-02-07 DIAGNOSIS — D84.821 DRUG-INDUCED IMMUNODEFICIENCY: ICD-10-CM

## 2024-02-07 DIAGNOSIS — E66.01 MORBID OBESITY: ICD-10-CM

## 2024-02-07 LAB
ANION GAP SERPL CALC-SCNC: 10 MMOL/L (ref 8–16)
BASOPHILS # BLD AUTO: 0.02 K/UL (ref 0–0.2)
BASOPHILS NFR BLD: 0.3 % (ref 0–1.9)
BUN SERPL-MCNC: 16 MG/DL (ref 8–23)
CALCIUM SERPL-MCNC: 10.1 MG/DL (ref 8.7–10.5)
CHLORIDE SERPL-SCNC: 105 MMOL/L (ref 95–110)
CHOLEST SERPL-MCNC: 169 MG/DL (ref 120–199)
CHOLEST/HDLC SERPL: 3.4 {RATIO} (ref 2–5)
CO2 SERPL-SCNC: 27 MMOL/L (ref 23–29)
CREAT SERPL-MCNC: 1 MG/DL (ref 0.5–1.4)
DIFFERENTIAL METHOD BLD: ABNORMAL
EOSINOPHIL # BLD AUTO: 0 K/UL (ref 0–0.5)
EOSINOPHIL NFR BLD: 0.4 % (ref 0–8)
ERYTHROCYTE [DISTWIDTH] IN BLOOD BY AUTOMATED COUNT: 13.2 % (ref 11.5–14.5)
EST. GFR  (NO RACE VARIABLE): >60 ML/MIN/1.73 M^2
GLUCOSE SERPL-MCNC: 97 MG/DL (ref 70–110)
HCT VFR BLD AUTO: 47.2 % (ref 40–54)
HDLC SERPL-MCNC: 49 MG/DL (ref 40–75)
HDLC SERPL: 29 % (ref 20–50)
HGB BLD-MCNC: 15.4 G/DL (ref 14–18)
IMM GRANULOCYTES # BLD AUTO: 0.02 K/UL (ref 0–0.04)
IMM GRANULOCYTES NFR BLD AUTO: 0.3 % (ref 0–0.5)
LDLC SERPL CALC-MCNC: 103 MG/DL (ref 63–159)
LYMPHOCYTES # BLD AUTO: 1.2 K/UL (ref 1–4.8)
LYMPHOCYTES NFR BLD: 15.3 % (ref 18–48)
MCH RBC QN AUTO: 31.6 PG (ref 27–31)
MCHC RBC AUTO-ENTMCNC: 32.6 G/DL (ref 32–36)
MCV RBC AUTO: 97 FL (ref 82–98)
MONOCYTES # BLD AUTO: 0.6 K/UL (ref 0.3–1)
MONOCYTES NFR BLD: 8.1 % (ref 4–15)
NEUTROPHILS # BLD AUTO: 6 K/UL (ref 1.8–7.7)
NEUTROPHILS NFR BLD: 75.6 % (ref 38–73)
NONHDLC SERPL-MCNC: 120 MG/DL
NRBC BLD-RTO: 0 /100 WBC
PLATELET # BLD AUTO: 216 K/UL (ref 150–450)
PMV BLD AUTO: 9.8 FL (ref 9.2–12.9)
POTASSIUM SERPL-SCNC: 5 MMOL/L (ref 3.5–5.1)
RBC # BLD AUTO: 4.88 M/UL (ref 4.6–6.2)
SODIUM SERPL-SCNC: 142 MMOL/L (ref 136–145)
TRIGL SERPL-MCNC: 85 MG/DL (ref 30–150)
WBC # BLD AUTO: 7.92 K/UL (ref 3.9–12.7)

## 2024-02-07 PROCEDURE — 1101F PT FALLS ASSESS-DOCD LE1/YR: CPT | Mod: HCNC,CPTII,S$GLB, | Performed by: NURSE PRACTITIONER

## 2024-02-07 PROCEDURE — 3288F FALL RISK ASSESSMENT DOCD: CPT | Mod: HCNC,CPTII,S$GLB, | Performed by: NURSE PRACTITIONER

## 2024-02-07 PROCEDURE — 3078F DIAST BP <80 MM HG: CPT | Mod: HCNC,CPTII,S$GLB, | Performed by: NURSE PRACTITIONER

## 2024-02-07 PROCEDURE — 85025 COMPLETE CBC W/AUTO DIFF WBC: CPT | Mod: HCNC,PO | Performed by: NURSE PRACTITIONER

## 2024-02-07 PROCEDURE — 4010F ACE/ARB THERAPY RXD/TAKEN: CPT | Mod: HCNC,CPTII,S$GLB, | Performed by: NURSE PRACTITIONER

## 2024-02-07 PROCEDURE — 80048 BASIC METABOLIC PNL TOTAL CA: CPT | Mod: HCNC,PO | Performed by: NURSE PRACTITIONER

## 2024-02-07 PROCEDURE — 3074F SYST BP LT 130 MM HG: CPT | Mod: HCNC,CPTII,S$GLB, | Performed by: NURSE PRACTITIONER

## 2024-02-07 PROCEDURE — 36415 COLL VENOUS BLD VENIPUNCTURE: CPT | Mod: HCNC,PO | Performed by: NURSE PRACTITIONER

## 2024-02-07 PROCEDURE — 1160F RVW MEDS BY RX/DR IN RCRD: CPT | Mod: HCNC,CPTII,S$GLB, | Performed by: NURSE PRACTITIONER

## 2024-02-07 PROCEDURE — 80061 LIPID PANEL: CPT | Mod: HCNC | Performed by: NURSE PRACTITIONER

## 2024-02-07 PROCEDURE — 3008F BODY MASS INDEX DOCD: CPT | Mod: HCNC,CPTII,S$GLB, | Performed by: NURSE PRACTITIONER

## 2024-02-07 PROCEDURE — 99999 PR PBB SHADOW E&M-EST. PATIENT-LVL III: CPT | Mod: PBBFAC,HCNC,, | Performed by: NURSE PRACTITIONER

## 2024-02-07 PROCEDURE — 99214 OFFICE O/P EST MOD 30 MIN: CPT | Mod: HCNC,S$GLB,, | Performed by: NURSE PRACTITIONER

## 2024-02-07 PROCEDURE — 1126F AMNT PAIN NOTED NONE PRSNT: CPT | Mod: HCNC,CPTII,S$GLB, | Performed by: NURSE PRACTITIONER

## 2024-02-07 PROCEDURE — 1159F MED LIST DOCD IN RCRD: CPT | Mod: HCNC,CPTII,S$GLB, | Performed by: NURSE PRACTITIONER

## 2024-02-07 RX ORDER — ESOMEPRAZOLE MAGNESIUM 40 MG/1
40 CAPSULE, DELAYED RELEASE ORAL DAILY
Qty: 90 CAPSULE | Refills: 3 | Status: SHIPPED | OUTPATIENT
Start: 2024-02-07

## 2024-02-07 RX ORDER — LISINOPRIL AND HYDROCHLOROTHIAZIDE 12.5; 2 MG/1; MG/1
1 TABLET ORAL DAILY
Qty: 90 TABLET | Refills: 3 | Status: SHIPPED | OUTPATIENT
Start: 2024-02-07

## 2024-02-07 NOTE — PROGRESS NOTES
Subjective:       Patient ID: Juan Shepherd is a 73 y.o. male.    Chief Complaint: Annual Exam (hypertension)    Mr. Shepherd presents to visit for routine 6 month HTN follow up. No acute complaints today. Discussed getting COVID and RSV from pharmacy. Continues to follow up with urology and rheumatology. Titrating down on prednisone for PMR. Tolerating well. Opted for cologuard for colon cancer screening.     Hypertension  This is a chronic problem. The current episode started more than 1 year ago. The problem is controlled. Associated symptoms include neck pain. Pertinent negatives include no anxiety, blurred vision, chest pain, headaches, malaise/fatigue, palpitations, peripheral edema or shortness of breath. There are no associated agents to hypertension. Risk factors for coronary artery disease include dyslipidemia, male gender and obesity. Past treatments include ACE inhibitors and diuretics. The current treatment provides significant improvement. There are no compliance problems.  There is no history of angina, kidney disease, CAD/MI, CVA, heart failure, left ventricular hypertrophy or PVD.       Patient Active Problem List   Diagnosis    Mixed hyperlipidemia    Essential hypertension    Family history of glaucoma    Age-related nuclear cataract of both eyes    Morbid obesity    Arthritis of knee    Seborrheic keratosis    Osteoarthritis of hand    Onychomycosis    Chronic allergic rhinitis    Prostate cancer    Multiple joint pain    GERD (gastroesophageal reflux disease)    Bilateral leg edema    PMR (polymyalgia rheumatica)    Long term (current) use of systemic steroids    Normocytic anemia    Severe obesity (BMI >= 40)    Erosive osteoarthritis of both hands    Drug-induced immunodeficiency       Family History   Problem Relation Age of Onset    Glaucoma Mother     Pulmonary embolism Mother     Dementia Father     Anxiety disorder Father     Post-traumatic stress disorder Father     Restless legs syndrome  Sister     Edema Sister     Polymyalgia rheumatica Brother     Clotting disorder Brother     Skin cancer Daughter     No Known Problems Son     No Known Problems Brother     No Known Problems Sister      Past Surgical History:   Procedure Laterality Date    CHOLECYSTECTOMY      COLONOSCOPY      FINGER SURGERY      HAND SURGERY      HERNIA REPAIR      LYMPHADENECTOMY, PELVIS Bilateral 8/21/2023    Procedure: LYMPHADENECTOMY, PELVIS;  Surgeon: Solomon Rose MD;  Location: [address];  Service: Urology;  Laterality: Bilateral;    ROBOT-ASSISTED LAPAROSCOPIC PROSTATECTOMY USING DA LORRAINE XI N/A 8/21/2023    Procedure: XI ROBOTIC PROSTATECTOMY;  Surgeon: Solomon Rose MD;  Location: [facility] OR [address] [place] [address];  Service: Urology;  Laterality: N/A;  3hrs    VASECTOMY           Current Outpatient Medications:     amLODIPine (NORVASC) 10 MG tablet, Take 1 tablet (10 mg total) by mouth once daily., Disp: 90 tablet, Rfl: 3    hydroxychloroquine (PLAQUENIL) 200 mg tablet, Take 1 tablet (200 mg total) by mouth 2 (two) times daily., Disp: 60 tablet, Rfl: 6    predniSONE (DELTASONE) 5 MG tablet, Take 1 tablet (5 mg total) by mouth once daily., Disp: 90 tablet, Rfl: 1    esomeprazole (NEXIUM) 40 MG capsule, Take 1 capsule (40 mg total) by mouth once daily., Disp: 90 capsule, Rfl: 3    lisinopriL-hydrochlorothiazide (PRINZIDE,ZESTORETIC) 20-12.5 mg per tablet, Take 1 tablet by mouth once daily., Disp: 90 tablet, Rfl: 3    simvastatin (ZOCOR) 40 MG tablet, Take 1 tablet (40 mg total) by mouth every evening. (Patient not taking: Reported on 2/5/2024), Disp: 90 tablet, Rfl: 3    Review of Systems   Constitutional:  Negative for activity change, chills, fatigue, fever, malaise/fatigue and unexpected weight change.   HENT:  Negative for trouble swallowing.    Eyes:  Negative for blurred vision, discharge and visual disturbance.   Respiratory:  Negative for cough, chest tightness, shortness of breath and wheezing.    Cardiovascular:   "Negative for chest pain and palpitations.   Gastrointestinal:  Negative for blood in stool, constipation, diarrhea and vomiting.   Endocrine: Negative for polydipsia and polyuria.   Genitourinary:  Negative for difficulty urinating, hematuria and urgency.   Musculoskeletal:  Positive for arthralgias, joint swelling and neck pain.   Neurological:  Negative for dizziness, weakness, light-headedness and headaches.   Psychiatric/Behavioral:  Negative for confusion and dysphoric mood. The patient is not nervous/anxious.        Objective:   /72 (BP Location: Left arm, Patient Position: Sitting, BP Method: X-Large (Manual))   Pulse 65   Temp 97.5 °F (36.4 °C) (Temporal)   Resp 16   Ht 5' 10" (1.778 m)   Wt 126.6 kg (279 lb 1.6 oz)   SpO2 98%   BMI 40.05 kg/m²      Physical Exam  Constitutional:       General: He is not in acute distress.     Appearance: Normal appearance. He is not ill-appearing.   HENT:      Head: Normocephalic.   Cardiovascular:      Rate and Rhythm: Normal rate and regular rhythm.      Pulses: Normal pulses.      Heart sounds: Normal heart sounds. No murmur heard.     No friction rub. No gallop.   Pulmonary:      Effort: Pulmonary effort is normal. No respiratory distress.      Breath sounds: Normal breath sounds. No wheezing.   Skin:     General: Skin is warm and dry.      Coloration: Skin is not pale.      Findings: No erythema.   Neurological:      Mental Status: He is alert and oriented to person, place, and time.   Psychiatric:         Mood and Affect: Mood normal.         Behavior: Behavior normal.         Assessment & Plan     Problem List Items Addressed This Visit          Cardiac/Vascular    Mixed hyperlipidemia    Current Assessment & Plan     Lipid panel today. On statin.          Relevant Orders    Lipid Panel    Essential hypertension    Current Assessment & Plan     Blood pressure stable. Continue current medications.         Relevant Medications    " "lisinopriL-hydrochlorothiazide (PRINZIDE,ZESTORETIC) 20-12.5 mg per tablet    Other Relevant Orders    BASIC METABOLIC PANEL    CBC Auto Differential (Completed)       Immunology/Multi System    PMR (polymyalgia rheumatica) - Primary    Current Assessment & Plan     Followed by rheum. On plaquenil and prednisone.          Drug-induced immunodeficiency    Current Assessment & Plan     On plaquenil and prednisone. Followed by rheum.             Oncology    Prostate cancer    Current Assessment & Plan     Followed by urology. No problems with surgery.            Endocrine    Morbid obesity    Current Assessment & Plan     Counseled on importance of diet and exercise in order to improve overall quality of life, and reduce risk of future comorbidities.              GI    GERD (gastroesophageal reflux disease)    Relevant Medications    esomeprazole (NEXIUM) 40 MG capsule     Other Visit Diagnoses       Colon cancer screening        Relevant Orders    Cologuard Screening (Multitarget Stool DNA)            Follow up in about 6 months (around 8/7/2024) for hypertension.          Portions of this note may have been created with voice recognition software. Occasional "wrong-word" or "sound-a-like" substitutions may have occurred due to the inherent limitations of voice recognition software. Please, read the note carefully and recognize, using context, where substitutions have occurred.       "

## 2024-02-23 LAB — NONINV COLON CA DNA+OCC BLD SCRN STL QL: NEGATIVE

## 2024-03-20 DIAGNOSIS — M15.4 EROSIVE OSTEOARTHRITIS OF BOTH HANDS: ICD-10-CM

## 2024-03-20 RX ORDER — HYDROXYCHLOROQUINE SULFATE 200 MG/1
200 TABLET, FILM COATED ORAL 2 TIMES DAILY
Qty: 180 TABLET | Refills: 1 | Status: SHIPPED | OUTPATIENT
Start: 2024-03-20

## 2024-03-28 ENCOUNTER — LAB VISIT (OUTPATIENT)
Dept: LAB | Facility: HOSPITAL | Age: 74
End: 2024-03-28
Attending: UROLOGY
Payer: MEDICARE

## 2024-03-28 DIAGNOSIS — C61 PROSTATE CANCER: ICD-10-CM

## 2024-03-28 PROCEDURE — 84153 ASSAY OF PSA TOTAL: CPT | Mod: HCNC | Performed by: UROLOGY

## 2024-03-28 PROCEDURE — 36415 COLL VENOUS BLD VENIPUNCTURE: CPT | Mod: HCNC,PO | Performed by: UROLOGY

## 2024-03-29 LAB — COMPLEXED PSA SERPL-MCNC: <0.01 NG/ML (ref 0–4)

## 2024-06-26 ENCOUNTER — TELEPHONE (OUTPATIENT)
Dept: INTERNAL MEDICINE | Facility: CLINIC | Age: 74
End: 2024-06-26
Payer: MEDICARE

## 2024-06-27 ENCOUNTER — HOSPITAL ENCOUNTER (OUTPATIENT)
Dept: RADIOLOGY | Facility: HOSPITAL | Age: 74
Discharge: HOME | End: 2024-06-27
Attending: NURSE PRACTITIONER
Payer: MEDICARE

## 2024-06-27 ENCOUNTER — OFFICE VISIT (OUTPATIENT)
Dept: INTERNAL MEDICINE | Facility: CLINIC | Age: 74
End: 2024-06-27
Payer: MEDICARE

## 2024-06-27 VITALS
OXYGEN SATURATION: 98 % | DIASTOLIC BLOOD PRESSURE: 76 MMHG | BODY MASS INDEX: 39.55 KG/M2 | HEIGHT: 70 IN | SYSTOLIC BLOOD PRESSURE: 118 MMHG | WEIGHT: 276.25 LBS | TEMPERATURE: 98 F | HEART RATE: 72 BPM

## 2024-06-27 DIAGNOSIS — M54.42 CHRONIC BILATERAL LOW BACK PAIN WITH LEFT-SIDED SCIATICA: ICD-10-CM

## 2024-06-27 DIAGNOSIS — G89.29 CHRONIC BILATERAL LOW BACK PAIN WITH LEFT-SIDED SCIATICA: ICD-10-CM

## 2024-06-27 DIAGNOSIS — M54.42 CHRONIC BILATERAL LOW BACK PAIN WITH LEFT-SIDED SCIATICA: Primary | ICD-10-CM

## 2024-06-27 DIAGNOSIS — I10 ESSENTIAL HYPERTENSION: ICD-10-CM

## 2024-06-27 DIAGNOSIS — Z79.899 DRUG-INDUCED IMMUNODEFICIENCY: ICD-10-CM

## 2024-06-27 DIAGNOSIS — M35.3 PMR (POLYMYALGIA RHEUMATICA): ICD-10-CM

## 2024-06-27 DIAGNOSIS — D84.821 DRUG-INDUCED IMMUNODEFICIENCY: ICD-10-CM

## 2024-06-27 DIAGNOSIS — G89.29 CHRONIC BILATERAL LOW BACK PAIN WITH LEFT-SIDED SCIATICA: Primary | ICD-10-CM

## 2024-06-27 PROBLEM — E66.812 CLASS 2 SEVERE OBESITY WITH BODY MASS INDEX (BMI) OF 35 TO 39.9 WITH SERIOUS COMORBIDITY: Status: ACTIVE | Noted: 2023-08-21

## 2024-06-27 PROBLEM — E66.01 CLASS 2 SEVERE OBESITY WITH BODY MASS INDEX (BMI) OF 35 TO 39.9 WITH SERIOUS COMORBIDITY: Status: ACTIVE | Noted: 2023-08-21

## 2024-06-27 PROCEDURE — 99999 PR PBB SHADOW E&M-EST. PATIENT-LVL IV: CPT | Mod: PBBFAC,HCNC,, | Performed by: NURSE PRACTITIONER

## 2024-06-27 PROCEDURE — 72100 X-RAY EXAM L-S SPINE 2/3 VWS: CPT | Mod: TC,HCNC,PO

## 2024-06-27 PROCEDURE — 72100 X-RAY EXAM L-S SPINE 2/3 VWS: CPT | Mod: 26,HCNC,, | Performed by: RADIOLOGY

## 2024-06-27 RX ORDER — BACLOFEN 10 MG/1
TABLET ORAL
Qty: 40 TABLET | Refills: 0 | Status: SHIPPED | OUTPATIENT
Start: 2024-06-27

## 2024-06-27 NOTE — PROGRESS NOTES
Subjective:       Patient ID: Juan Shephedr is a 73 y.o. male.    Chief Complaint: Back Pain and Referral    Mr Shepherd presents to visit for complaint of low back pain, radiates down L leg. He is responsible for care of spouse. She has been having multiple falls lately, sometimes two a day so has to lift her up often. Recently bought a lift, so hoping that will help with the burden on his back. Denies any problems with bowel and bladder. Mild relief with aleve, also increased his prednisone to 10 mg for past few days which has helped some also. Would like referral to Dr Rosenthal, pain management. Has had cervical injections with him in the past.     Back Pain  This is a recurrent problem. The problem has been waxing and waning since onset. Associated symptoms include leg pain (left) and tingling (L foot intermittently). Pertinent negatives include no abdominal pain, bladder incontinence, bowel incontinence, chest pain, dysuria, fever, headaches, numbness, paresis or paresthesias. Risk factors include obesity. He has tried analgesics and NSAIDs for the symptoms. The treatment provided mild relief.       Patient Active Problem List   Diagnosis    Mixed hyperlipidemia    Essential hypertension    Family history of glaucoma    Age-related nuclear cataract of both eyes    Class 2 severe obesity with body mass index (BMI) of 35 to 39.9 with serious comorbidity    Arthritis of knee    Seborrheic keratosis    Osteoarthritis of hand    Onychomycosis    Chronic allergic rhinitis    Prostate cancer    Multiple joint pain    GERD (gastroesophageal reflux disease)    Bilateral leg edema    PMR (polymyalgia rheumatica)    Long term (current) use of systemic steroids    Normocytic anemia    Erosive osteoarthritis of both hands    Drug-induced immunodeficiency    Chronic bilateral low back pain with left-sided sciatica       Family History   Problem Relation Name Age of Onset    Glaucoma Mother      Pulmonary embolism Mother       Dementia Father      Anxiety disorder Father      Post-traumatic stress disorder Father      Restless legs syndrome Sister Namita     Edema Sister Namita     Polymyalgia rheumatica Brother Tito     Clotting disorder Brother Tito     Skin cancer Daughter Mele     No Known Problems Son Miguel     No Known Problems Brother Shun     No Known Problems Sister Jael      Past Surgical History:   Procedure Laterality Date    CHOLECYSTECTOMY      COLONOSCOPY      FINGER SURGERY      HAND SURGERY      HERNIA REPAIR      LYMPHADENECTOMY, PELVIS Bilateral 8/21/2023    Procedure: LYMPHADENECTOMY, PELVIS;  Surgeon: Solomon Rose MD;  [facility];  Service: Urology;  Laterality: Bilateral;    ROBOT-ASSISTED LAPAROSCOPIC PROSTATECTOMY USING DA LORRAINE XI N/A 8/21/2023    Procedure: XI ROBOTIC PROSTATECTOMY;  Surgeon: Solomon Rose MD;  Location: [facility] OR 2ND FLR;  Service: Urology;  Laterality: N/A;  3hrs    VASECTOMY           Current Outpatient Medications:     amLODIPine (NORVASC) 10 MG tablet, Take 1 tablet (10 mg total) by mouth once daily., Disp: 90 tablet, Rfl: 3    esomeprazole (NEXIUM) 40 MG capsule, Take 1 capsule (40 mg total) by mouth once daily., Disp: 90 capsule, Rfl: 3    hydroxychloroquine (PLAQUENIL) 200 mg tablet, Take 1 tablet (200 mg total) by mouth 2 (two) times daily., Disp: 180 tablet, Rfl: 1    lisinopriL-hydrochlorothiazide (PRINZIDE,ZESTORETIC) 20-12.5 mg per tablet, Take 1 tablet by mouth once daily., Disp: 90 tablet, Rfl: 3    predniSONE (DELTASONE) 5 MG tablet, Take 1 tablet (5 mg total) by mouth once daily., Disp: 90 tablet, Rfl: 1    baclofen (LIORESAL) 10 MG tablet, Take 1-2 tablets PO TID PRN for muscle pain. May cause drowsiness., Disp: 40 tablet, Rfl: 0    Review of Systems   Constitutional:  Negative for appetite change, chills, fatigue and fever.   Eyes:  Negative for visual disturbance.   Respiratory:  Negative for shortness of breath.    Cardiovascular:  Negative  "for chest pain and palpitations.   Gastrointestinal:  Negative for abdominal pain, blood in stool, bowel incontinence, constipation, diarrhea, nausea and vomiting.   Genitourinary:  Negative for bladder incontinence, dysuria, flank pain, frequency and urgency.   Musculoskeletal:  Positive for back pain.   Neurological:  Positive for tingling (L foot intermittently). Negative for dizziness, light-headedness, numbness, headaches and paresthesias.       Objective:   /76 (BP Location: Left arm, Patient Position: Sitting, BP Method: Large (Manual))   Pulse 72   Temp 97.6 °F (36.4 °C) (Tympanic)   Ht 5' 10" (1.778 m)   Wt 125.3 kg (276 lb 3.8 oz)   SpO2 98%   BMI 39.64 kg/m²      Physical Exam  Constitutional:       General: He is not in acute distress.     Appearance: Normal appearance. He is obese. He is not ill-appearing.   Cardiovascular:      Rate and Rhythm: Normal rate.   Pulmonary:      Effort: Pulmonary effort is normal. No respiratory distress.   Musculoskeletal:      Lumbar back: No tenderness or bony tenderness. Decreased range of motion. Positive left straight leg raise test. Negative right straight leg raise test.      Comments: Pain with internal rotation of L leg.   Skin:     General: Skin is warm and dry.      Coloration: Skin is not pale.      Findings: No erythema.   Neurological:      Mental Status: He is alert and oriented to person, place, and time.   Psychiatric:         Mood and Affect: Mood normal.         Behavior: Behavior normal.         Assessment & Plan     Problem List Items Addressed This Visit          Cardiac/Vascular    Essential hypertension    Current Assessment & Plan     Blood pressure stable. Continue current medications.            Immunology/Multi System    PMR (polymyalgia rheumatica)    Current Assessment & Plan     On prednisone and plaquenil. Followed by rheum.          Drug-induced immunodeficiency    Current Assessment & Plan     On prednisone and plaquenil. " "Followed by rheum.             Orthopedic    Chronic bilateral low back pain with left-sided sciatica - Primary    Relevant Medications    baclofen (LIORESAL) 10 MG tablet    Other Relevant Orders    X-Ray Lumbar Spine 2 Or 3 Views (Completed)    Ambulatory referral/consult to Pain Clinic   Stretches  Heating pad  Massages  NSAID/muscle relaxer rx.     Follow up if symptoms worsen or fail to improve.                Portions of this note may have been created with voice recognition software. Occasional "wrong-word" or "sound-a-like" substitutions may have occurred due to the inherent limitations of voice recognition software. Please, read the note carefully and recognize, using context, where substitutions have occurred.       "

## 2024-07-01 DIAGNOSIS — M15.4 EROSIVE OSTEOARTHRITIS OF BOTH HANDS: ICD-10-CM

## 2024-07-01 DIAGNOSIS — M35.3 PMR (POLYMYALGIA RHEUMATICA): ICD-10-CM

## 2024-07-02 RX ORDER — PREDNISONE 5 MG/1
5 TABLET ORAL
Qty: 90 TABLET | Refills: 3 | Status: SHIPPED | OUTPATIENT
Start: 2024-07-02

## 2024-07-30 ENCOUNTER — LAB VISIT (OUTPATIENT)
Dept: LAB | Facility: HOSPITAL | Age: 74
End: 2024-07-30
Attending: INTERNAL MEDICINE
Payer: MEDICARE

## 2024-07-30 DIAGNOSIS — M35.3 PMR (POLYMYALGIA RHEUMATICA): ICD-10-CM

## 2024-07-30 LAB
CRP SERPL-MCNC: 3.6 MG/L (ref 0–8.2)
ERYTHROCYTE [SEDIMENTATION RATE] IN BLOOD BY PHOTOMETRIC METHOD: 8 MM/HR (ref 0–23)

## 2024-07-30 PROCEDURE — 86140 C-REACTIVE PROTEIN: CPT | Mod: HCNC,PO | Performed by: INTERNAL MEDICINE

## 2024-07-30 PROCEDURE — 36415 COLL VENOUS BLD VENIPUNCTURE: CPT | Mod: HCNC,PO | Performed by: INTERNAL MEDICINE

## 2024-07-30 PROCEDURE — 85652 RBC SED RATE AUTOMATED: CPT | Mod: HCNC | Performed by: INTERNAL MEDICINE

## 2024-08-07 ENCOUNTER — OFFICE VISIT (OUTPATIENT)
Dept: RHEUMATOLOGY | Facility: CLINIC | Age: 74
End: 2024-08-07
Payer: MEDICARE

## 2024-08-07 VITALS — BODY MASS INDEX: 39.45 KG/M2 | HEIGHT: 70 IN | WEIGHT: 275.56 LBS

## 2024-08-07 DIAGNOSIS — M15.4 EROSIVE OSTEOARTHRITIS OF BOTH HANDS: ICD-10-CM

## 2024-08-07 DIAGNOSIS — M35.3 PMR (POLYMYALGIA RHEUMATICA): Primary | ICD-10-CM

## 2024-08-07 DIAGNOSIS — Z79.52 LONG TERM (CURRENT) USE OF SYSTEMIC STEROIDS: ICD-10-CM

## 2024-08-07 DIAGNOSIS — Z79.899 LONG-TERM USE OF PLAQUENIL: ICD-10-CM

## 2024-08-07 PROCEDURE — 99999 PR PBB SHADOW E&M-EST. PATIENT-LVL III: CPT | Mod: PBBFAC,HCNC,, | Performed by: INTERNAL MEDICINE

## 2024-08-07 RX ORDER — PREDNISONE 5 MG/1
TABLET ORAL
Start: 2024-08-07 | End: 2025-02-03

## 2024-09-09 DIAGNOSIS — M15.4 EROSIVE OSTEOARTHRITIS OF BOTH HANDS: ICD-10-CM

## 2024-09-09 RX ORDER — HYDROXYCHLOROQUINE SULFATE 200 MG/1
200 TABLET, FILM COATED ORAL 2 TIMES DAILY
Qty: 180 TABLET | Refills: 3 | Status: SHIPPED | OUTPATIENT
Start: 2024-09-09

## 2024-10-10 ENCOUNTER — PATIENT MESSAGE (OUTPATIENT)
Dept: ADMINISTRATIVE | Facility: CLINIC | Age: 74
End: 2024-10-10
Payer: MEDICARE

## 2024-10-14 ENCOUNTER — TELEPHONE (OUTPATIENT)
Dept: ADMINISTRATIVE | Facility: CLINIC | Age: 74
End: 2024-10-14
Payer: MEDICARE

## 2024-10-16 ENCOUNTER — OFFICE VISIT (OUTPATIENT)
Dept: HOME HEALTH SERVICES | Facility: CLINIC | Age: 74
End: 2024-10-16
Payer: MEDICARE

## 2024-10-16 DIAGNOSIS — Z79.52 LONG TERM (CURRENT) USE OF SYSTEMIC STEROIDS: ICD-10-CM

## 2024-10-16 DIAGNOSIS — Z00.00 ENCOUNTER FOR PREVENTIVE HEALTH EXAMINATION: Primary | ICD-10-CM

## 2024-10-16 NOTE — PATIENT INSTRUCTIONS
Counseling and Referral of Other Preventative  (Italic type indicates deductible and co-insurance are waived)    Patient Name: Juan Shepherd  Today's Date: 10/16/2024    Health Maintenance       Date Due Completion Date    RSV Vaccine (Age 60+ and Pregnant patients) (1 - Risk 60-74 years 1-dose series) Never done ---    Influenza Vaccine (1) 09/01/2024 12/12/2023    COVID-19 Vaccine (4 - 2024-25 season) 09/01/2024 10/14/2021    Lipid Panel 02/07/2025 2/7/2024    PROSTATE-SPECIFIC ANTIGEN 03/28/2025 3/28/2024    Colorectal Cancer Screening 02/13/2027 2/13/2024    TETANUS VACCINE 11/29/2027 11/29/2017        No orders of the defined types were placed in this encounter.      The following information is provided to all patients.  This information is to help you find resources for any of the problems found today that may be affecting your health:                  Living healthy guide: www.[facility].[place].[facility]      Understanding Diabetes: www.diabetes.org      Eating healthy: www.cdc.gov/healthyweight      CDC home safety checklist: www.cdc.gov/steadi/patient.html      Agency on Aging: www.goea.[place].gov      Alcoholics anonymous (AA): www.aa.org      Physical Activity: www.seema.nih.gov/gg7wzix      Tobacco use: www.quitwithusla.org

## 2024-10-16 NOTE — PROGRESS NOTES
The patient location is: [place]  The chief complaint leading to consultation is: awv    Visit type: audiovisual    Face to Face time with patient: 25  60 minutes of total time spent on the encounter, which includes face to face time and non-face to face time preparing to see the patient (eg, review of tests), Obtaining and/or reviewing separately obtained history, Documenting clinical information in the electronic or other health record, Independently interpreting results (not separately reported) and communicating results to the patient/family/caregiver, or Care coordination (not separately reported).         Each patient to whom he or she provides medical services by telemedicine is:  (1) informed of the relationship between the physician and patient and the respective role of any other health care provider with respect to management of the patient; and (2) notified that he or she may decline to receive medical services by telemedicine and may withdraw from such care at any time.    Notes:       Juan Shepherd presented for an initial Medicare AWV today. The following components were reviewed and updated:    Medical history  Family History  Social history  Allergies and Current Medications  Health Risk Assessment  Health Maintenance  Care Team    **See Completed Assessments for Annual Wellness visit with in the encounter summary    The following assessments were completed:  Depression Screening  Cognitive function Screening  Timed Get Up Test  Whisper Test      Opioid documentation:      Patient does not have a current opioid prescription.          There were no vitals filed for this visit.  There is no height or weight on file to calculate BMI.       Physical Exam  Constitutional:       Appearance: Normal appearance.   Neurological:      Mental Status: He is alert.   Psychiatric:         Attention and Perception: Attention and perception normal.         Mood and Affect: Mood and affect normal.         Speech:  Speech normal.         Behavior: Behavior normal. Behavior is cooperative.         Thought Content: Thought content normal.         Judgment: Judgment normal.           Diagnoses and health risks identified today and associated recommendations/orders:  1. Encounter for preventive health examination  Stable, followed by provider    2. Long term (current) use of systemic steroids  Stable, followed by provider      Provided Juan with a 5-10 year written screening schedule and personal prevention plan. Recommendations were developed using the USPSTF age appropriate recommendations. Education, counseling, and referrals were provided as needed.  After Visit Summary printed and given to patient which includes a list of additional screenings\tests needed.    Follow up in about 1 year (around 10/16/2025) for your next annual wellness visit.      Donato Sheth NP  I offered to discuss advanced care planning, including how to pick a person who would make decisions for you if you were unable to make them for yourself, called a health care power of , and what kind of decisions you might make such as use of life sustaining treatments such as ventilators and tube feeding when faced with a life limiting illness recorded on a living will that they will need to know. (How you want to be cared for as you near the end of your natural life)     X  Patient has advanced directives on file, which we reviewed, and they do not wish to make changes.

## 2024-11-19 DIAGNOSIS — I10 ESSENTIAL HYPERTENSION: ICD-10-CM

## 2024-11-19 RX ORDER — LISINOPRIL AND HYDROCHLOROTHIAZIDE 12.5; 2 MG/1; MG/1
1 TABLET ORAL
Qty: 90 TABLET | Refills: 3 | Status: SHIPPED | OUTPATIENT
Start: 2024-11-19

## 2024-12-03 DIAGNOSIS — I10 ESSENTIAL HYPERTENSION: Chronic | ICD-10-CM

## 2024-12-03 RX ORDER — AMLODIPINE BESYLATE 10 MG/1
10 TABLET ORAL DAILY
Qty: 90 TABLET | Refills: 3 | Status: SHIPPED | OUTPATIENT
Start: 2024-12-03

## 2024-12-03 NOTE — TELEPHONE ENCOUNTER
----- Message from Maria Gesperanzajos sent at 12/3/2024  8:42 AM CST -----  Contact: [phone]  Type:  RX Refill Request    Who Called: LEONOR STAFFORD [[id number]]  Refill or New Rx:REFILL  RX Name and Strength:amLODIPine (NORVASC) 10 MG tablet  How is the patient currently taking it? (ex. 1XDay):  Is this a 30 day or 90 day RX:  Preferred Pharmacy with phone number:: [phone] Fax: [phone]      Local or Mail Order:MAIL  Ordering Provider:ST LEAL  Would the patient rather a call back or a response via [facility]? CALL BACK  Best Call Back Number: [phone]  Additional Information: MRN [id number]        [facility] - [place], [place] - [address] [facility]  [address] [address]  Phone: [phone] Fax: [phone]

## 2025-01-20 DIAGNOSIS — K21.9 GASTROESOPHAGEAL REFLUX DISEASE, UNSPECIFIED WHETHER ESOPHAGITIS PRESENT: ICD-10-CM

## 2025-01-21 RX ORDER — ESOMEPRAZOLE MAGNESIUM 40 MG/1
40 CAPSULE, DELAYED RELEASE ORAL
Qty: 90 CAPSULE | Refills: 3 | Status: SHIPPED | OUTPATIENT
Start: 2025-01-21

## 2025-02-05 ENCOUNTER — LAB VISIT (OUTPATIENT)
Dept: LAB | Facility: HOSPITAL | Age: 75
End: 2025-02-05
Attending: INTERNAL MEDICINE
Payer: MEDICARE

## 2025-02-05 DIAGNOSIS — M35.3 PMR (POLYMYALGIA RHEUMATICA): ICD-10-CM

## 2025-02-05 DIAGNOSIS — M15.4 EROSIVE OSTEOARTHRITIS OF BOTH HANDS: ICD-10-CM

## 2025-02-05 LAB
CRP SERPL-MCNC: 2.7 MG/L (ref 0–8.2)
ERYTHROCYTE [SEDIMENTATION RATE] IN BLOOD BY PHOTOMETRIC METHOD: 14 MM/HR (ref 0–23)

## 2025-02-05 PROCEDURE — 36415 COLL VENOUS BLD VENIPUNCTURE: CPT | Mod: HCNC,PO | Performed by: INTERNAL MEDICINE

## 2025-02-05 PROCEDURE — 85652 RBC SED RATE AUTOMATED: CPT | Mod: HCNC | Performed by: INTERNAL MEDICINE

## 2025-02-05 PROCEDURE — 86140 C-REACTIVE PROTEIN: CPT | Mod: HCNC,PO | Performed by: INTERNAL MEDICINE

## 2025-02-11 ENCOUNTER — LAB VISIT (OUTPATIENT)
Dept: LAB | Facility: HOSPITAL | Age: 75
End: 2025-02-11
Attending: NURSE PRACTITIONER
Payer: MEDICARE

## 2025-02-11 ENCOUNTER — OFFICE VISIT (OUTPATIENT)
Dept: INTERNAL MEDICINE | Facility: CLINIC | Age: 75
End: 2025-02-11
Payer: MEDICARE

## 2025-02-11 VITALS
DIASTOLIC BLOOD PRESSURE: 70 MMHG | BODY MASS INDEX: 40.4 KG/M2 | HEIGHT: 70 IN | OXYGEN SATURATION: 99 % | WEIGHT: 282.19 LBS | HEART RATE: 59 BPM | TEMPERATURE: 97 F | SYSTOLIC BLOOD PRESSURE: 116 MMHG

## 2025-02-11 DIAGNOSIS — E66.01 MORBID OBESITY WITH BMI OF 40.0-44.9, ADULT: ICD-10-CM

## 2025-02-11 DIAGNOSIS — M35.3 PMR (POLYMYALGIA RHEUMATICA): ICD-10-CM

## 2025-02-11 DIAGNOSIS — C61 PROSTATE CANCER: ICD-10-CM

## 2025-02-11 DIAGNOSIS — E78.2 MIXED HYPERLIPIDEMIA: ICD-10-CM

## 2025-02-11 DIAGNOSIS — Z79.899 LONG-TERM USE OF PLAQUENIL: ICD-10-CM

## 2025-02-11 DIAGNOSIS — G89.29 CHRONIC BILATERAL LOW BACK PAIN WITH LEFT-SIDED SCIATICA: ICD-10-CM

## 2025-02-11 DIAGNOSIS — I10 ESSENTIAL HYPERTENSION: ICD-10-CM

## 2025-02-11 DIAGNOSIS — Z79.899 DRUG-INDUCED IMMUNODEFICIENCY: ICD-10-CM

## 2025-02-11 DIAGNOSIS — M54.42 CHRONIC BILATERAL LOW BACK PAIN WITH LEFT-SIDED SCIATICA: ICD-10-CM

## 2025-02-11 DIAGNOSIS — I10 ESSENTIAL HYPERTENSION: Primary | ICD-10-CM

## 2025-02-11 DIAGNOSIS — D84.821 DRUG-INDUCED IMMUNODEFICIENCY: ICD-10-CM

## 2025-02-11 LAB
ALBUMIN SERPL BCP-MCNC: 3.9 G/DL (ref 3.5–5.2)
ALP SERPL-CCNC: 65 U/L (ref 40–150)
ALT SERPL W/O P-5'-P-CCNC: 15 U/L (ref 10–44)
ANION GAP SERPL CALC-SCNC: 11 MMOL/L (ref 8–16)
AST SERPL-CCNC: 16 U/L (ref 10–40)
BASOPHILS # BLD AUTO: 0.03 K/UL (ref 0–0.2)
BASOPHILS NFR BLD: 0.5 % (ref 0–1.9)
BILIRUB SERPL-MCNC: 0.9 MG/DL (ref 0.1–1)
BUN SERPL-MCNC: 14 MG/DL (ref 8–23)
CALCIUM SERPL-MCNC: 8.9 MG/DL (ref 8.7–10.5)
CHLORIDE SERPL-SCNC: 104 MMOL/L (ref 95–110)
CHOLEST SERPL-MCNC: 158 MG/DL (ref 120–199)
CHOLEST/HDLC SERPL: 3.5 {RATIO} (ref 2–5)
CO2 SERPL-SCNC: 25 MMOL/L (ref 23–29)
CREAT SERPL-MCNC: 0.9 MG/DL (ref 0.5–1.4)
DIFFERENTIAL METHOD BLD: ABNORMAL
EOSINOPHIL # BLD AUTO: 0.1 K/UL (ref 0–0.5)
EOSINOPHIL NFR BLD: 1.9 % (ref 0–8)
ERYTHROCYTE [DISTWIDTH] IN BLOOD BY AUTOMATED COUNT: 13.1 % (ref 11.5–14.5)
EST. GFR  (NO RACE VARIABLE): >60 ML/MIN/1.73 M^2
GLUCOSE SERPL-MCNC: 88 MG/DL (ref 70–110)
HCT VFR BLD AUTO: 45.3 % (ref 40–54)
HDLC SERPL-MCNC: 45 MG/DL (ref 40–75)
HDLC SERPL: 28.5 % (ref 20–50)
HGB BLD-MCNC: 15 G/DL (ref 14–18)
IMM GRANULOCYTES # BLD AUTO: 0.02 K/UL (ref 0–0.04)
IMM GRANULOCYTES NFR BLD AUTO: 0.3 % (ref 0–0.5)
LDLC SERPL CALC-MCNC: 96 MG/DL (ref 63–159)
LYMPHOCYTES # BLD AUTO: 1 K/UL (ref 1–4.8)
LYMPHOCYTES NFR BLD: 16.6 % (ref 18–48)
MCH RBC QN AUTO: 31.7 PG (ref 27–31)
MCHC RBC AUTO-ENTMCNC: 33.1 G/DL (ref 32–36)
MCV RBC AUTO: 96 FL (ref 82–98)
MONOCYTES # BLD AUTO: 0.6 K/UL (ref 0.3–1)
MONOCYTES NFR BLD: 9.4 % (ref 4–15)
NEUTROPHILS # BLD AUTO: 4.4 K/UL (ref 1.8–7.7)
NEUTROPHILS NFR BLD: 71.3 % (ref 38–73)
NONHDLC SERPL-MCNC: 113 MG/DL
NRBC BLD-RTO: 0 /100 WBC
PLATELET # BLD AUTO: 158 K/UL (ref 150–450)
PMV BLD AUTO: 10 FL (ref 9.2–12.9)
POTASSIUM SERPL-SCNC: 4 MMOL/L (ref 3.5–5.1)
PROT SERPL-MCNC: 7.1 G/DL (ref 6–8.4)
RBC # BLD AUTO: 4.73 M/UL (ref 4.6–6.2)
SODIUM SERPL-SCNC: 140 MMOL/L (ref 136–145)
TRIGL SERPL-MCNC: 85 MG/DL (ref 30–150)
WBC # BLD AUTO: 6.2 K/UL (ref 3.9–12.7)

## 2025-02-11 PROCEDURE — 36415 COLL VENOUS BLD VENIPUNCTURE: CPT | Mod: HCNC,PO | Performed by: NURSE PRACTITIONER

## 2025-02-11 PROCEDURE — 85025 COMPLETE CBC W/AUTO DIFF WBC: CPT | Mod: HCNC,PO | Performed by: NURSE PRACTITIONER

## 2025-02-11 PROCEDURE — 99999 PR PBB SHADOW E&M-EST. PATIENT-LVL III: CPT | Mod: PBBFAC,HCNC,, | Performed by: NURSE PRACTITIONER

## 2025-02-11 PROCEDURE — 80061 LIPID PANEL: CPT | Mod: HCNC | Performed by: NURSE PRACTITIONER

## 2025-02-11 PROCEDURE — 80053 COMPREHEN METABOLIC PANEL: CPT | Mod: HCNC,PO | Performed by: NURSE PRACTITIONER

## 2025-02-11 RX ORDER — PREDNISONE 5 MG/1
5 TABLET ORAL DAILY
COMMUNITY
End: 2025-02-12 | Stop reason: SDUPTHER

## 2025-02-11 RX ORDER — MELOXICAM 15 MG/1
15 TABLET ORAL DAILY PRN
Qty: 30 TABLET | Refills: 3 | Status: SHIPPED | OUTPATIENT
Start: 2025-02-11

## 2025-02-11 NOTE — PROGRESS NOTES
Subjective:       Patient ID: Juan Shepherd is a 74 y.o. male.    Chief Complaint: Annual Exam      History of Present Illness    CHIEF COMPLAINT:  - Mr. Shepherd presents for an annual wellness visit and to discuss potential weight loss medication options.    HPI:  Mr. Shepherd reports chronic back pain exacerbated by weight gain over the past 5-10 years. An x-ray of his back last year revealed chronic wedging. The pain limits his activities but remains tolerable. He previously used meloxicam for pain relief, which was effective, but he has exhausted his supply. Mr. Shepherd consulted Dr. Rosenthal for back pain, who ordered an MRI, but the patient has not followed up due to reduced pain severity at the time of the appointment. Dr. Rosenthal advised the patient to contact when pain worsens.    Mr. Shepherd reports flashing in his left eye for a few months. A similar issue occurred in his right eye, lasting 3-4 months before spontaneous resolution. Upcoming eye exam    He has sinus headaches and congestion every morning, which resolve later in the day. He is not currently using medication for these symptoms.    Mr. Shepherd underwent prostate surgery in August 2023, performed by Dr. Rose. His most recent PSA test showed a level less than 0.01. He reports no current urinary symptoms but has erectile dysfunction (ED) as a post-surgical complication.    Mr. Shepherd expresses interest in weight loss injections, noting previous weight loss of 30 lbs before surgery through a strict diet, which he found unsustainable.    Mr. Shepherd denies urinary frequency, increased thirst, and urinary dribbling.     Eye exam up to date.   Dental exam up to date, every 6 months.   Prostate cancer screening up to date.   Plans to follow up with Dr Rosenthal for lumbar complaints.   Difficulty with exercising due to low back pain.     Patient Active Problem List   Diagnosis    Mixed hyperlipidemia    Essential hypertension    Family history of glaucoma     Age-related nuclear cataract of both eyes    Morbid obesity with BMI of 40.0-44.9, adult    Arthritis of knee    Seborrheic keratosis    Osteoarthritis of hand    Onychomycosis    Chronic allergic rhinitis    Prostate cancer    Multiple joint pain    GERD (gastroesophageal reflux disease)    Bilateral leg edema    PMR (polymyalgia rheumatica)    Long term (current) use of systemic steroids    Normocytic anemia    Erosive osteoarthritis of both hands    Drug-induced immunodeficiency    Chronic bilateral low back pain with left-sided sciatica    Long-term use of Plaquenil         Past Surgical History:   Procedure Laterality Date    CHOLECYSTECTOMY      COLONOSCOPY      FINGER SURGERY      HAND SURGERY      HERNIA REPAIR      LYMPHADENECTOMY, PELVIS Bilateral 8/21/2023    Procedure: LYMPHADENECTOMY, PELVIS;  Surgeon: Solomon Rose MD;  Location: [facility] OR [address] [place] [address];  Service: Urology;  Laterality: Bilateral;    ROBOT-ASSISTED LAPAROSCOPIC PROSTATECTOMY USING DA LORRAINE XI N/A 8/21/2023    Procedure: XI ROBOTIC PROSTATECTOMY;  Surgeon: Solomon Rose MD;  Location: [facility] OR [address] [place] [address];  Service: Urology;  Laterality: N/A;  3hrs    VASECTOMY           Current Outpatient Medications:     amLODIPine (NORVASC) 10 MG tablet, Take 1 tablet (10 mg total) by mouth once daily., Disp: 90 tablet, Rfl: 3    esomeprazole (NEXIUM) 40 MG capsule, TAKE 1 CAPSULE ONE TIME DAILY, Disp: 90 capsule, Rfl: 3    hydroxychloroquine (PLAQUENIL) 200 mg tablet, TAKE 1 TABLET TWICE DAILY, Disp: 180 tablet, Rfl: 3    lisinopriL-hydrochlorothiazide (PRINZIDE,ZESTORETIC) 20-12.5 mg per tablet, TAKE 1 TABLET EVERY DAY, Disp: 90 tablet, Rfl: 3    predniSONE (DELTASONE) 5 MG tablet, Take 5 mg by mouth once daily. Patient taking 1/2 tablets, Disp: , Rfl:     meloxicam (MOBIC) 15 MG tablet, Take 1 tablet (15 mg total) by mouth daily as needed (for musculoskeletal pain)., Disp: 30 tablet, Rfl: 3    semaglutide, weight loss, 0.25 mg/0.5 mL PnIj, Inject  "0.25 mg into the skin once a week., Disp: 2 mL, Rfl: 0    Review of Systems   Constitutional:  Negative for activity change, chills, fatigue and fever.   HENT:  Negative for hearing loss and trouble swallowing.    Eyes:  Positive for visual disturbance (flashers L eye). Negative for discharge.   Respiratory:  Negative for cough, chest tightness, shortness of breath and wheezing.    Cardiovascular:  Negative for chest pain and palpitations.   Gastrointestinal:  Negative for abdominal pain, constipation, diarrhea and vomiting.   Endocrine: Negative for polydipsia, polyphagia and polyuria.   Genitourinary:  Negative for difficulty urinating, frequency and hematuria.   Musculoskeletal:  Positive for arthralgias, back pain and neck pain.   Neurological:  Positive for headaches. Negative for dizziness and light-headedness.   Psychiatric/Behavioral:  Negative for dysphoric mood.        Objective:   /70 (BP Location: Left arm, Patient Position: Sitting)   Pulse (!) 59   Temp 96.7 °F (35.9 °C) (Tympanic)   Ht 5' 10" (1.778 m)   Wt 128 kg (282 lb 3 oz)   SpO2 99%   BMI 40.49 kg/m²      Physical Exam  Constitutional:       General: He is not in acute distress.     Appearance: Normal appearance. He is not ill-appearing.   HENT:      Head: Normocephalic.   Cardiovascular:      Rate and Rhythm: Normal rate and regular rhythm.      Pulses: Normal pulses.      Heart sounds: Normal heart sounds. No murmur heard.     No friction rub. No gallop.   Pulmonary:      Effort: Pulmonary effort is normal. No respiratory distress.      Breath sounds: Normal breath sounds. No wheezing.   Abdominal:      Palpations: Abdomen is soft.      Tenderness: There is no abdominal tenderness. There is no guarding.   Skin:     General: Skin is warm and dry.      Coloration: Skin is not pale.      Findings: No erythema.   Neurological:      Mental Status: He is alert and oriented to person, place, and time.   Psychiatric:         Mood and " Affect: Mood normal.         Behavior: Behavior normal.         Assessment & Plan     Problem List Items Addressed This Visit          Cardiac/Vascular    Mixed hyperlipidemia    Current Assessment & Plan     Lipid panel today. Lifestyle modifications.          Relevant Orders    Lipid Panel    Essential hypertension - Primary    Current Assessment & Plan     Blood pressure stable. Continue current medications.          Relevant Orders    CBC Auto Differential (Completed)    COMPREHENSIVE METABOLIC PANEL (Completed)    Lipid Panel       ID    Long-term use of Plaquenil    Current Assessment & Plan     Followed by rheum.            Immunology/Multi System    PMR (polymyalgia rheumatica)    Current Assessment & Plan     On prednisone and plaquenil. Followed by rheum.          Drug-induced immunodeficiency    Current Assessment & Plan     On plaquenil. Followed by rheum.             Oncology    Prostate cancer    Current Assessment & Plan     Previously followed by urology. PSA due next month.             Endocrine    Morbid obesity with BMI of 40.0-44.9, adult    Current Assessment & Plan     Counseled on importance of diet and exercise in order to improve overall quality of life, and reduce risk of future comorbidities.  Trial of rx for wegovy.          Relevant Medications    semaglutide, weight loss, 0.25 mg/0.5 mL PnIj       Orthopedic    Chronic bilateral low back pain with left-sided sciatica    Current Assessment & Plan     Mobic prn refilled.          Relevant Medications    meloxicam (MOBIC) 15 MG tablet       Assessment & Plan    MEDICAL DECISION MAKING:  - Assessed urinary symptoms and erectile dysfunction post-prostate surgery  - Evaluated chronic back pain, noting previous MRI showed thoracic vertebral wedging  - Considered weight loss medication for obesity, noting potential insurance coverage issues  - Reviewed morning sinus headaches and congestion  - Assessed visual symptoms, noting persistent left  "eye flashes for a few months    PATIENT EDUCATION:  - Explained that erectile dysfunction and urinary dribbling are common post-prostate surgery complications  - Discussed thoracic vertebral wedging  - Provided information on weight loss medication, explaining the loading dose process and typical timeline for results    ACTION ITEMS/LIFESTYLE:  - Mr. Shepherd to continue performing urinary control exercises    MEDICATIONS:  - Attempted to prescribe weight loss injection medication (pending insurance approval)  - Refilled meloxicam for back pain    ORDERS:  - Ordered annual wellness labs including routine anemia levels, liver function, kidney function, electrolytes, and cholesterol  - Ordered glucose test to check for potential diabetes    FOLLOW UP:  - Follow up in 2027 for colon cancer screening  - Contact the office if back pain worsens for potential injection treatment  - Mr. Shepherd to get COVID and RSV vaccines at the pharmacy if interested        Follow up in 6 months, or sooner if needed.       Visit today included increased complexity associated with the care of the episodic problem  addressed and managing the longitudinal care of the patient due to the serious and/or complex managed problem(s) .    Portions of this note may have been created with voice recognition software. Occasional "wrong-word" or "sound-a-like" substitutions may have occurred due to the inherent limitations of voice recognition software. Please, read the note carefully and recognize, using context, where substitutions have occurred.       This note was generated with the assistance of ambient listening technology. Verbal consent was obtained by the patient and accompanying visitor(s) for the recording of patient appointment to facilitate this note. I attest to having reviewed and edited the generated note for accuracy, though some syntax or spelling errors may persist. Please contact the author of this note for any clarification.       "

## 2025-02-11 NOTE — ASSESSMENT & PLAN NOTE
Counseled on importance of diet and exercise in order to improve overall quality of life, and reduce risk of future comorbidities.  Trial of rx for wegovy.

## 2025-02-12 ENCOUNTER — OFFICE VISIT (OUTPATIENT)
Dept: RHEUMATOLOGY | Facility: CLINIC | Age: 75
End: 2025-02-12
Payer: MEDICARE

## 2025-02-12 VITALS
BODY MASS INDEX: 40.02 KG/M2 | SYSTOLIC BLOOD PRESSURE: 122 MMHG | WEIGHT: 279.56 LBS | HEART RATE: 62 BPM | DIASTOLIC BLOOD PRESSURE: 78 MMHG | HEIGHT: 70 IN

## 2025-02-12 DIAGNOSIS — Z79.52 LONG TERM (CURRENT) USE OF SYSTEMIC STEROIDS: ICD-10-CM

## 2025-02-12 DIAGNOSIS — Z79.899 LONG-TERM USE OF PLAQUENIL: Primary | ICD-10-CM

## 2025-02-12 DIAGNOSIS — M35.3 PMR (POLYMYALGIA RHEUMATICA): Primary | ICD-10-CM

## 2025-02-12 DIAGNOSIS — Z79.899 LONG-TERM USE OF PLAQUENIL: ICD-10-CM

## 2025-02-12 DIAGNOSIS — M54.42 CHRONIC BILATERAL LOW BACK PAIN WITH LEFT-SIDED SCIATICA: ICD-10-CM

## 2025-02-12 DIAGNOSIS — G89.29 CHRONIC BILATERAL LOW BACK PAIN WITH LEFT-SIDED SCIATICA: ICD-10-CM

## 2025-02-12 DIAGNOSIS — M15.4 EROSIVE OSTEOARTHRITIS OF BOTH HANDS: ICD-10-CM

## 2025-02-12 PROCEDURE — 1125F AMNT PAIN NOTED PAIN PRSNT: CPT | Mod: HCNC,CPTII,S$GLB, | Performed by: INTERNAL MEDICINE

## 2025-02-12 PROCEDURE — 99999 PR PBB SHADOW E&M-EST. PATIENT-LVL III: CPT | Mod: PBBFAC,HCNC,, | Performed by: INTERNAL MEDICINE

## 2025-02-12 PROCEDURE — G2211 COMPLEX E/M VISIT ADD ON: HCPCS | Mod: HCNC,S$GLB,, | Performed by: INTERNAL MEDICINE

## 2025-02-12 PROCEDURE — 1160F RVW MEDS BY RX/DR IN RCRD: CPT | Mod: HCNC,CPTII,S$GLB, | Performed by: INTERNAL MEDICINE

## 2025-02-12 PROCEDURE — 1159F MED LIST DOCD IN RCRD: CPT | Mod: HCNC,CPTII,S$GLB, | Performed by: INTERNAL MEDICINE

## 2025-02-12 PROCEDURE — 3074F SYST BP LT 130 MM HG: CPT | Mod: HCNC,CPTII,S$GLB, | Performed by: INTERNAL MEDICINE

## 2025-02-12 PROCEDURE — 3078F DIAST BP <80 MM HG: CPT | Mod: HCNC,CPTII,S$GLB, | Performed by: INTERNAL MEDICINE

## 2025-02-12 PROCEDURE — 99215 OFFICE O/P EST HI 40 MIN: CPT | Mod: HCNC,S$GLB,, | Performed by: INTERNAL MEDICINE

## 2025-02-12 RX ORDER — PREDNISONE 5 MG/1
5 TABLET ORAL DAILY
Qty: 90 TABLET | Refills: 1 | Status: SHIPPED | OUTPATIENT
Start: 2025-02-12

## 2025-02-12 NOTE — PROGRESS NOTES
RHEUMATOLOGY CLINIC FOLLOW UP VISIT  Chief complaints, HPI, ROS, EXAM, Assessment & Plans:-  Juan Ordonez a 74 y.o. pleasant male comes in for follow-up visit.  Polymyalgia rheumatica and erosive osteoarthritis here for follow-up.  100% resolution PMR symptoms on prednisone taper.  Presently on 2.5 mg DAILY without any recurrence.  No symptoms of GCA.  Making a better fist on Plaquenil in terms of erosive osteoarthritis.  No significant adverse effects.  Has chronic deformities of thumb joints. Worsening lumbago.  Rheumatological review of system negative otherwise.  Exam shows chronic deformities but no active synovitis.  No synovitis or effusion of large joints..    1. PMR (polymyalgia rheumatica)    2. Erosive osteoarthritis of both hands    3. Long term (current) use of systemic steroids    4. Long-term use of Plaquenil    5. Chronic bilateral low back pain with left-sided sciatica      Problem List Items Addressed This Visit       Chronic bilateral low back pain with left-sided sciatica    Relevant Medications    predniSONE (DELTASONE) 5 MG tablet    Erosive osteoarthritis of both hands    Relevant Medications    predniSONE (DELTASONE) 5 MG tablet    Long term (current) use of systemic steroids    Long-term use of Plaquenil    PMR (polymyalgia rheumatica) - Primary    Relevant Medications    predniSONE (DELTASONE) 5 MG tablet       Labs reviewed today:-   Latest Reference Range & Units Most Recent   TOBI Screen Negative <1:80  Negative <1:80  7/7/23 11:23   CCP Antibodies <5.0 U/mL <0.5  7/7/23 11:23   Interleukin 6 <6.4 pg/mL 6.0  9/20/23 12:20   Rheumatoid Factor 0.0 - 15.0 IU/mL <13.0  7/7/23 11:23        Latest Reference Range & Units 02/05/25 09:13 02/11/25 09:11   WBC 3.90 - 12.70 K/uL  6.20   RBC 4.60 - 6.20 M/uL  4.73   Hemoglobin 14.0 - 18.0 g/dL  15.0   Hematocrit 40.0 - 54.0 %  45.3   MCV 82 - 98 fL  96   MCH 27.0 - 31.0 pg  31.7 (H)   MCHC 32.0 -  36.0 g/dL  33.1   RDW 11.5 - 14.5 %  13.1   Platelet Count 150 - 450 K/uL  158   MPV 9.2 - 12.9 fL  10.0   Gran % 38.0 - 73.0 %  71.3   Lymph % 18.0 - 48.0 %  16.6 (L)   Mono % 4.0 - 15.0 %  9.4   Eos % 0.0 - 8.0 %  1.9   Basophil % 0.0 - 1.9 %  0.5   Immature Granulocytes 0.0 - 0.5 %  0.3   Gran # (ANC) 1.8 - 7.7 K/uL  4.4   Lymph # 1.0 - 4.8 K/uL  1.0   Mono # 0.3 - 1.0 K/uL  0.6   Eos # 0.0 - 0.5 K/uL  0.1   Baso # 0.00 - 0.20 K/uL  0.03   Immature Grans (Abs) 0.00 - 0.04 K/uL  0.02   nRBC 0 /100 WBC  0   Differential Method   Automated   Sed Rate 0 - 23 mm/Hr 14    Sodium 136 - 145 mmol/L  140   Potassium 3.5 - 5.1 mmol/L  4.0   Chloride 95 - 110 mmol/L  104   CO2 23 - 29 mmol/L  25   Anion Gap 8 - 16 mmol/L  11   BUN 8 - 23 mg/dL  14   Creatinine 0.5 - 1.4 mg/dL  0.9   eGFR >60 mL/min/1.73 m^2  >60.0   Glucose 70 - 110 mg/dL  88   Calcium 8.7 - 10.5 mg/dL  8.9   ALP 40 - 150 U/L  65   PROTEIN TOTAL 6.0 - 8.4 g/dL  7.1   Albumin 3.5 - 5.2 g/dL  3.9   BILIRUBIN TOTAL 0.1 - 1.0 mg/dL  0.9   AST 10 - 40 U/L  16   ALT 10 - 44 U/L  15   CRP 0.0 - 8.2 mg/L 2.7    Cholesterol Total 120 - 199 mg/dL  158   HDL 40 - 75 mg/dL  45   HDL/Cholesterol Ratio 20.0 - 50.0 %  28.5   Non-HDL Cholesterol mg/dL  113   Total Cholesterol/HDL Ratio 2.0 - 5.0   3.5   Triglycerides 30 - 150 mg/dL  85   LDL Cholesterol 63.0 - 159.0 mg/dL  96.0   (H): Data is abnormally high  (L): Data is abnormally low      Well controlled polymyalgia rheumatica on prednisone taper. No flare of PMR but worsening lumbago with dose reduction of prednisone. Ok to increase back the prednisone to 5 mg since it also helps with his OA.   Normal bone density scan.  Significant improvement of synovitis associated with erosive osteoarthritis on Plaquenil.  Continue.  Plaquenil screen with ophthalmologist yearly.  I have explained all of the above in detail and the patient understands all of the current recommendation(s). I have answered all questions to the best of  my ability and to their complete satisfaction.       # Follow up in about 6 months (around 8/12/2025).      Disclaimer: This note was prepared using voice recognition system and is likely to have sound alike errors and is not proof read.  Please call me with any questions.

## 2025-02-18 ENCOUNTER — PATIENT MESSAGE (OUTPATIENT)
Dept: INTERNAL MEDICINE | Facility: CLINIC | Age: 75
End: 2025-02-18
Payer: MEDICARE

## 2025-04-11 ENCOUNTER — E-VISIT (OUTPATIENT)
Dept: INTERNAL MEDICINE | Facility: CLINIC | Age: 75
End: 2025-04-11
Payer: MEDICARE

## 2025-04-11 DIAGNOSIS — M54.50 ACUTE LOW BACK PAIN WITHOUT SCIATICA, UNSPECIFIED BACK PAIN LATERALITY: Primary | ICD-10-CM

## 2025-04-11 RX ORDER — METHOCARBAMOL 500 MG/1
500 TABLET, FILM COATED ORAL 3 TIMES DAILY PRN
Qty: 30 TABLET | Refills: 0 | Status: SHIPPED | OUTPATIENT
Start: 2025-04-11

## 2025-04-11 RX ORDER — DICLOFENAC SODIUM 50 MG/1
50 TABLET, DELAYED RELEASE ORAL 2 TIMES DAILY PRN
Qty: 30 TABLET | Refills: 0 | Status: SHIPPED | OUTPATIENT
Start: 2025-04-11

## 2025-04-11 NOTE — PROGRESS NOTES
Patient ID: Juan Shepherd is a 74 y.o. male.    Chief Complaint: General Illness (Entered automatically based on patient selection in [facility].)    The patient initiated a request through [facility] on 4/11/2025 for evaluation and management with a chief complaint of General Illness (Entered automatically based on patient selection in [facility].)     I evaluated the questionnaire submission on 04/11/2025.    [facility] Evisit [facility]-Muscle,Back,Joint    4/11/2025  8:08 AM CDT - Filed by Patient   What do you need help with? Back Problem   Do you agree to participate in an E-Visit? Yes   If you have any of the following symptoms, please present to your local emergency room or call 911: I acknowledge   What is the main issue you would like addressed today? Severe lower back spasms   Where are you having pain? Lower Back   Does the pain extend into your legs? No   How bad is the pain? The pain is severe   Did you have an injury that caused the pain? No, I cannot remember an injury   How long has the pain been present? Today and yesterday   Have you had back pain in the past? Yes, I have infrequently had pain similar to this before   Please list any medications or treatments you have used for back pain and indicate if it was effective or not. Had a steroid injection on March 13.  Helped for just about a week only.   Do you have a fever? No   Do you have any of the following? None of the above   What makes the pain worse? Any movement;  Bending over;  Sitting down;  Strenuous activity   What makes the pain better? Hot or cold compress   Have you ever been diagnosed with cancer? Yes   Have you ever been diagnosed with degenerative disc disease (arthritis of the spine)? Yes   Have you ever been diagnosed with osteoporosis or any other bone weakness? I am not sure   Have you ever had surgery on your back or spine? No   What is your usual health status? I am active and can move normally   Provide any additional information you feel  is important.    Please attach any relevant images or files    Are you able to take your vital signs? No         Encounter Diagnosis   Name Primary?    Acute low back pain without sciatica, unspecified back pain laterality Yes        No orders of the defined types were placed in this encounter.     Medications Ordered This Encounter   Medications    diclofenac (VOLTAREN) 50 MG EC tablet     Sig: Take 1 tablet (50 mg total) by mouth 2 (two) times daily as needed (pain).     Dispense:  30 tablet     Refill:  0    methocarbamoL (ROBAXIN) 500 MG Tab     Sig: Take 1 tablet (500 mg total) by mouth 3 (three) times daily as needed.     Dispense:  30 tablet     Refill:  0    Stretches  Heating pad  Massages  NSAID/muscle relaxer rx.       Follow up if symptoms worsen or fail to improve.      E-Visit Time Tracking:    Day 1 Time (in minutes): 5    Total Time (in minutes): 5

## 2025-05-27 DIAGNOSIS — G89.29 CHRONIC BILATERAL LOW BACK PAIN WITH LEFT-SIDED SCIATICA: ICD-10-CM

## 2025-05-27 DIAGNOSIS — M54.42 CHRONIC BILATERAL LOW BACK PAIN WITH LEFT-SIDED SCIATICA: ICD-10-CM

## 2025-05-27 RX ORDER — MELOXICAM 15 MG/1
15 TABLET ORAL DAILY PRN
Qty: 30 TABLET | Refills: 11 | Status: SHIPPED | OUTPATIENT
Start: 2025-05-27

## 2025-06-30 DIAGNOSIS — M54.42 CHRONIC BILATERAL LOW BACK PAIN WITH LEFT-SIDED SCIATICA: ICD-10-CM

## 2025-06-30 DIAGNOSIS — G89.29 CHRONIC BILATERAL LOW BACK PAIN WITH LEFT-SIDED SCIATICA: ICD-10-CM

## 2025-06-30 DIAGNOSIS — M15.4 EROSIVE OSTEOARTHRITIS OF BOTH HANDS: ICD-10-CM

## 2025-06-30 DIAGNOSIS — M35.3 PMR (POLYMYALGIA RHEUMATICA): ICD-10-CM

## 2025-06-30 RX ORDER — PREDNISONE 5 MG/1
5 TABLET ORAL
Qty: 90 TABLET | Refills: 3 | Status: SHIPPED | OUTPATIENT
Start: 2025-06-30

## 2025-07-01 DIAGNOSIS — M54.50 ACUTE LOW BACK PAIN WITHOUT SCIATICA, UNSPECIFIED BACK PAIN LATERALITY: ICD-10-CM

## 2025-07-02 RX ORDER — DICLOFENAC SODIUM 50 MG/1
50 TABLET, DELAYED RELEASE ORAL 2 TIMES DAILY PRN
Qty: 30 TABLET | Refills: 0 | Status: SHIPPED | OUTPATIENT
Start: 2025-07-02

## 2025-07-02 RX ORDER — METHOCARBAMOL 500 MG/1
500 TABLET, FILM COATED ORAL 3 TIMES DAILY PRN
Qty: 30 TABLET | Refills: 0 | Status: SHIPPED | OUTPATIENT
Start: 2025-07-02

## 2025-07-25 ENCOUNTER — OFFICE VISIT (OUTPATIENT)
Dept: INTERNAL MEDICINE | Facility: CLINIC | Age: 75
End: 2025-07-25
Payer: MEDICARE

## 2025-07-25 ENCOUNTER — HOSPITAL ENCOUNTER (OUTPATIENT)
Dept: CARDIOLOGY | Facility: HOSPITAL | Age: 75
Discharge: HOME | End: 2025-07-25
Payer: MEDICARE

## 2025-07-25 ENCOUNTER — HOSPITAL ENCOUNTER (OUTPATIENT)
Dept: RADIOLOGY | Facility: HOSPITAL | Age: 75
Discharge: HOME | End: 2025-07-25
Attending: NURSE PRACTITIONER
Payer: MEDICARE

## 2025-07-25 VITALS
SYSTOLIC BLOOD PRESSURE: 113 MMHG | HEIGHT: 70 IN | WEIGHT: 258.38 LBS | TEMPERATURE: 98 F | OXYGEN SATURATION: 94 % | HEART RATE: 90 BPM | DIASTOLIC BLOOD PRESSURE: 75 MMHG | RESPIRATION RATE: 18 BRPM | BODY MASS INDEX: 36.99 KG/M2

## 2025-07-25 DIAGNOSIS — I49.1 PAC (PREMATURE ATRIAL CONTRACTION): ICD-10-CM

## 2025-07-25 DIAGNOSIS — D84.821 DRUG-INDUCED IMMUNODEFICIENCY: ICD-10-CM

## 2025-07-25 DIAGNOSIS — Z79.899 DRUG-INDUCED IMMUNODEFICIENCY: ICD-10-CM

## 2025-07-25 DIAGNOSIS — R42 DIZZINESS AND GIDDINESS: Primary | ICD-10-CM

## 2025-07-25 DIAGNOSIS — R42 DIZZINESS AND GIDDINESS: ICD-10-CM

## 2025-07-25 DIAGNOSIS — H53.2 DIPLOPIA: ICD-10-CM

## 2025-07-25 DIAGNOSIS — Z79.52 LONG TERM (CURRENT) USE OF SYSTEMIC STEROIDS: ICD-10-CM

## 2025-07-25 DIAGNOSIS — Z79.899 LONG-TERM USE OF PLAQUENIL: ICD-10-CM

## 2025-07-25 DIAGNOSIS — I10 ESSENTIAL HYPERTENSION: ICD-10-CM

## 2025-07-25 DIAGNOSIS — M35.3 PMR (POLYMYALGIA RHEUMATICA): ICD-10-CM

## 2025-07-25 DIAGNOSIS — D64.9 NORMOCYTIC ANEMIA: ICD-10-CM

## 2025-07-25 DIAGNOSIS — R94.31 ABNORMAL EKG: ICD-10-CM

## 2025-07-25 DIAGNOSIS — C61 PROSTATE CANCER: ICD-10-CM

## 2025-07-25 LAB
OHS QRS DURATION: 90 MS
OHS QTC CALCULATION: 449 MS

## 2025-07-25 PROCEDURE — 99214 OFFICE O/P EST MOD 30 MIN: CPT | Mod: S$GLB,,, | Performed by: NURSE PRACTITIONER

## 2025-07-25 PROCEDURE — 70450 CT HEAD/BRAIN W/O DYE: CPT | Mod: 26,HCNC,, | Performed by: RADIOLOGY

## 2025-07-25 PROCEDURE — 93005 ELECTROCARDIOGRAM TRACING: CPT | Mod: PO

## 2025-07-25 PROCEDURE — 3074F SYST BP LT 130 MM HG: CPT | Mod: CPTII,S$GLB,, | Performed by: NURSE PRACTITIONER

## 2025-07-25 PROCEDURE — 3288F FALL RISK ASSESSMENT DOCD: CPT | Mod: CPTII,S$GLB,, | Performed by: NURSE PRACTITIONER

## 2025-07-25 PROCEDURE — 3078F DIAST BP <80 MM HG: CPT | Mod: CPTII,S$GLB,, | Performed by: NURSE PRACTITIONER

## 2025-07-25 PROCEDURE — 1125F AMNT PAIN NOTED PAIN PRSNT: CPT | Mod: CPTII,S$GLB,, | Performed by: NURSE PRACTITIONER

## 2025-07-25 PROCEDURE — 1101F PT FALLS ASSESS-DOCD LE1/YR: CPT | Mod: CPTII,S$GLB,, | Performed by: NURSE PRACTITIONER

## 2025-07-25 PROCEDURE — 99999 PR PBB SHADOW E&M-EST. PATIENT-LVL V: CPT | Mod: PBBFAC,,, | Performed by: NURSE PRACTITIONER

## 2025-07-25 PROCEDURE — G2211 COMPLEX E/M VISIT ADD ON: HCPCS | Mod: S$GLB,,, | Performed by: NURSE PRACTITIONER

## 2025-07-25 PROCEDURE — 1159F MED LIST DOCD IN RCRD: CPT | Mod: CPTII,S$GLB,, | Performed by: NURSE PRACTITIONER

## 2025-07-25 PROCEDURE — 1160F RVW MEDS BY RX/DR IN RCRD: CPT | Mod: CPTII,S$GLB,, | Performed by: NURSE PRACTITIONER

## 2025-07-25 PROCEDURE — 93010 ELECTROCARDIOGRAM REPORT: CPT | Mod: S$GLB,,, | Performed by: INTERNAL MEDICINE

## 2025-07-25 PROCEDURE — 70450 CT HEAD/BRAIN W/O DYE: CPT | Mod: TC,HCNC,PO

## 2025-07-25 NOTE — PROGRESS NOTES
Subjective:       Patient ID: Juan Shepherd is a 74 y.o. male.    Chief Complaint: Dizziness and Diplopia    History of Present Illness    HPI:  Mr. Shepherd reports 2 distinct episodes of concerning symptoms. The first occurred last week while mowing the lawn. He suddenly felt dizzy with spotty vision, diaphoresis, and clammy skin. He also had blurred vision. He immediately stopped activity, went indoors, and sat down. He measured his blood pressure, which was 102/57. The episode lasted approximately 30-40 seconds to a minute.    The second set of episodes occurred yesterday while watching TV. He had similar symptoms twice, about an hour apart, including diplopia. These episodes also lasted about 30-40 seconds to a minute each. He reports feeling severely distressed during these episodes.    He takes his medication every morning around 6:30-7:00 AM, and all episodes occurred after lunch. He has chronic tinnitus for about 20 years and a history of vertigo, previously evaluated by an ENT specialist who performed the Epley maneuver. He attempted the Epley maneuver himself this morning but had no relief.    Headaches almost every morning and feeling congested. He has been taking Zyrtec at night, suspecting the headaches were sinus-related. He mentions occasionally seeing a scotoma in his vision that requires him to blink several times or move his head to resolve. This visual disturbance is relatively new.    He notes that Aleve causes fluid retention, which resolves 2-3 days after discontinuing the medication. He is currently seeing a therapist for a back problem and reports weakness in his left leg, making it difficult to stand on that leg alone.    He denies fever, chills, body aches, upper respiratory symptoms (other than sinus congestion), nausea, vomiting, acute hearing loss, chest pain, palpitations, numbness, tingling, weakness, or slurred speech. He denies any current dizziness or vision problems.      Dizziness:    Chronicity:  Recurrent  Onset:  1 to 4 weeks ago  Progression since onset:  Waxing and waning  Dizziness characteristics:  Lightheaded/impending faint  Initial Spell Date and Length:  60 seconds   Associated symptoms: headaches, tinnitus (chronic), visual disturbances and light-headedness.no ear congestion, no fever, no nausea, no vomiting, no aural fullness, no weakness, no syncope, no panic, no facial weakness, no slurred speech, no numbness in extremities and no chest pain.      Problem List[1]    Family History   Problem Relation Name Age of Onset    Glaucoma Mother      Pulmonary embolism Mother      Dementia Father      Anxiety disorder Father      Post-traumatic stress disorder Father      Restless legs syndrome Sister Namita     Edema Sister Namita     Polymyalgia rheumatica Brother Tito     Clotting disorder Brother Tito     Skin cancer Daughter Mele     No Known Problems Son Miguel     No Known Problems Brother Shun     No Known Problems Sister Jael      Past Surgical History:   Procedure Laterality Date    CHOLECYSTECTOMY      COLONOSCOPY      FINGER SURGERY      HAND SURGERY      HERNIA REPAIR      LYMPHADENECTOMY, PELVIS Bilateral 8/21/2023    Procedure: LYMPHADENECTOMY, PELVIS;  Surgeon: Solomon Rsoe MD;  Location: [facility] OR [address] [place] [address];  Service: Urology;  Laterality: Bilateral;    ROBOT-ASSISTED LAPAROSCOPIC PROSTATECTOMY USING DA LORRAINE XI N/A 8/21/2023    Procedure: XI ROBOTIC PROSTATECTOMY;  Surgeon: Solomon Rose MD;  Location: [facility] OR [address] [place] [address];  Service: Urology;  Laterality: N/A;  3hrs    VASECTOMY         Current Medications[2]    Review of Systems   Constitutional:  Negative for fever.   HENT:  Positive for congestion, sinus pressure and tinnitus (chronic).    Eyes:  Positive for visual disturbance.   Cardiovascular:  Negative for chest pain and syncope.   Gastrointestinal:  Negative for nausea and vomiting.   Neurological:  Positive for dizziness, light-headedness and  "headaches. Negative for weakness.       Objective:   /75   Pulse 90   Temp 97.8 °F (36.6 °C) (Tympanic)   Resp 18   Ht 5' 10" (1.778 m)   Wt 117.2 kg (258 lb 6.1 oz)   SpO2 (!) 94%   BMI 37.07 kg/m²      Physical Exam  Vitals reviewed.   Constitutional:       General: He is not in acute distress.     Appearance: Normal appearance. He is obese. He is not ill-appearing, toxic-appearing or diaphoretic.   HENT:      Head: Normocephalic and atraumatic.      Nose: Congestion present.      Mouth/Throat:      Mouth: Mucous membranes are moist.      Pharynx: Oropharynx is clear. No oropharyngeal exudate or posterior oropharyngeal erythema.   Eyes:      Extraocular Movements: Extraocular movements intact.      Conjunctiva/sclera: Conjunctivae normal.      Pupils: Pupils are equal, round, and reactive to light.      Comments: glasses   Cardiovascular:      Rate and Rhythm: Normal rate and regular rhythm.      Heart sounds: Normal heart sounds. No murmur heard.  Pulmonary:      Effort: Pulmonary effort is normal. No respiratory distress.      Breath sounds: Normal breath sounds. No wheezing, rhonchi or rales.   Musculoskeletal:      Cervical back: Normal range of motion. No rigidity or tenderness.   Lymphadenopathy:      Cervical: No cervical adenopathy.   Skin:     Findings: No rash.   Neurological:      Mental Status: He is alert and oriented to person, place, and time.      GCS: GCS eye subscore is 4. GCS verbal subscore is 5. GCS motor subscore is 6.      Cranial Nerves: Cranial nerves 2-12 are intact. No cranial nerve deficit, dysarthria or facial asymmetry.      Sensory: Sensation is intact. No sensory deficit.      Motor: Motor function is intact. No weakness.      Coordination: Coordination is intact. Romberg sign negative. Coordination normal. Finger-Nose-Finger Test normal. Rapid alternating movements normal.      Gait: Gait is intact. Gait and tandem walk normal.   Psychiatric:         Mood and Affect: " Mood normal.         Behavior: Behavior normal.         Assessment & Plan     Assessment & Plan    DIZZINESS AND GIDDINESS:  - Mr. Shepherd experienced dizziness while cutting grass and watching TV, with episodes lasting 30 to 60 seconds.  - Episodes are associated with cold sweats, clammy body, and blurred vision.  - Current episodes are not like previous vertigo; no spinning sensation is reported.  - Asymptomatic today, except for headache. Neuro exam is intact.  - Ordered labs, urinalysis, EKG, and CT head to further evaluate.  - Advised the patient to check BP if another episode occurs and to stay well hydrated.    DIPLOPIA (DOUBLE VISION):  - Mr. Shepherd experienced double vision during episodes of dizziness.  - CT of the head to rule out acute issues.    TINNITUS:  - Mr. Shepherd has chronic ringing in the ears for 20 years.  - Determined that tinnitus is chronic and not related to recent dizziness episodes.    VERTIGO:  - Mr. Shepherd has a history of vertigo, previously treated with Epley maneuver.  - Recent episodes not similar to previous vertigo    HEADACHE:  - Mr. Shepherd experiences headaches every morning, described as sinus headaches.  - Noted that headaches are frequent but not new.    SINUS DISORDERS:  - Mr. Shepherd experiences sinus congestion every morning.  - Sinus congestion is chronic, contributes to feeling foggy, and was considered as potential contributor to symptoms.    VISUAL DISTURBANCES:  - Mr. Shepherd reports a blind spot in the morning, sometimes needing to blink to clear it.  - Visual disturbances are transient and not present today.    BACK PAIN:  - Mr. Shepherd has a back problem, described as a hard back, affecting mobility.  - Back pain is severe enough to prevent exercise; the patient is undergoing therapy.    MUSCLE WEAKNESS:  - Mr. Shepherd reports left leg weakness, unable to stand on one leg.  - Left leg weakness affects daily activities like dressing.    ADVERSE EFFECT OF NSAIDS:  - Mr. Shepherd  reports fluid retention when taking Aleve, which resolves after discontinuation.      GENERAL INSTRUCTIONS AND FOLLOW-UP:  - Educated the patient on signs requiring immediate emergency care: prolonged duration of symptoms, increased frequency, neurological changes (persistent vision changes, slurred speech, facial droop, numbness, weakness).  - Explained difference between CT and MRI in detecting acute vs. chronic changes.  - Mr. Shepherd to check BP at home before taking medicine and 2 hours after.  - Instructed to contact the office for test results and if episodes last more than 60 seconds, occur frequently, or if additional neurologic changes develop.          1. Dizziness and giddiness  Comments:  No concerning findings on labs, urine, or CT head. Abnormal EKG NS with PACs. Proceed with 48 hr holter and referral to cardiology  Orders:  -     CT Head Without Contrast; Future; Expected date: 07/25/2025  -     CBC W/ AUTO DIFFERENTIAL; Future; Expected date: 07/25/2025  -     Comprehensive Metabolic Panel; Future; Expected date: 07/25/2025  -     Urinalysis, Reflex to Urine Culture Urine, Clean Catch; Future; Expected date: 07/25/2025  -     IN OFFICE EKG 12-LEAD (to Muse)  -     Holter monitor - 48 hour; Future  -     Ambulatory referral/consult to Cardiology; Future; Expected date: 08/01/2025    2. Diplopia  -     CT Head Without Contrast; Future; Expected date: 07/25/2025  -     CBC W/ AUTO DIFFERENTIAL; Future; Expected date: 07/25/2025  -     Comprehensive Metabolic Panel; Future; Expected date: 07/25/2025  -     Urinalysis, Reflex to Urine Culture Urine, Clean Catch; Future; Expected date: 07/25/2025  -     IN OFFICE EKG 12-LEAD (to Muse)    3. PAC (premature atrial contraction)  -     Ambulatory referral/consult to Cardiology; Future; Expected date: 08/01/2025    4. Abnormal EKG  -     Ambulatory referral/consult to Cardiology; Future; Expected date: 08/01/2025    5. Essential hypertension  Assessment &  "Plan:  BP controlled. Continue current medications as prescribed.      6. Long-term use of Plaquenil  Assessment & Plan:  Followed by rheumatology.      7. Drug-induced immunodeficiency  Assessment & Plan:  Chronic. On Plaquenil. Followed by Rheum      8. PMR (polymyalgia rheumatica)  Assessment & Plan:  On prednisone and Plaquenil. Followed by rheumatology.      9. Normocytic anemia  Assessment & Plan:  Repeat labs today      10. Prostate cancer  Assessment & Plan:  Previously followed by urology.       11. Long term (current) use of systemic steroids  Assessment & Plan:  Taking for PMR. Followed by rheumatology.        Visit today included increased complexity associated with the care of the episodic problem addressed and managing the longitudinal care of the patient due to the serious and/or complex managed problem(s).        ROSIE Powers    This note was generated with the assistance of ambient listening technology. Verbal consent was obtained by the patient and accompanying visitor(s) for the recording of patient appointment to facilitate this note. I attest to having reviewed and edited the generated note for accuracy, though some syntax or spelling errors may persist. Please contact the author of this note for any clarification.       Portions of this note may have been created with voice recognition software. Occasional "wrong-word" or "sound-a-like" substitutions may have occurred due to the inherent limitations of voice recognition software. Please, read the note carefully and recognize, using context, where substitutions have occurred.             [1]   Patient Active Problem List  Diagnosis    Mixed hyperlipidemia    Essential hypertension    Family history of glaucoma    Age-related nuclear cataract of both eyes    Morbid obesity with BMI of 40.0-44.9, adult    Arthritis of knee    Seborrheic keratosis    Osteoarthritis of hand    Onychomycosis    Chronic allergic rhinitis    Prostate cancer "    Multiple joint pain    GERD (gastroesophageal reflux disease)    Bilateral leg edema    PMR (polymyalgia rheumatica)    Long term (current) use of systemic steroids    Normocytic anemia    Erosive osteoarthritis of both hands    Drug-induced immunodeficiency    Chronic bilateral low back pain with left-sided sciatica    Long-term use of Plaquenil   [2]   Current Outpatient Medications:     amLODIPine (NORVASC) 10 MG tablet, Take 1 tablet (10 mg total) by mouth once daily., Disp: 90 tablet, Rfl: 3    diclofenac (VOLTAREN) 50 MG EC tablet, Take 1 tablet (50 mg total) by mouth 2 (two) times daily as needed (pain)., Disp: 30 tablet, Rfl: 0    esomeprazole (NEXIUM) 40 MG capsule, TAKE 1 CAPSULE ONE TIME DAILY, Disp: 90 capsule, Rfl: 3    hydroxychloroquine (PLAQUENIL) 200 mg tablet, TAKE 1 TABLET TWICE DAILY, Disp: 180 tablet, Rfl: 3    lisinopriL-hydrochlorothiazide (PRINZIDE,ZESTORETIC) 20-12.5 mg per tablet, TAKE 1 TABLET EVERY DAY, Disp: 90 tablet, Rfl: 3    meloxicam (MOBIC) 15 MG tablet, TAKE 1 TABLET (15 MG TOTAL) BY MOUTH DAILY AS NEEDED (FOR MUSCULOSKELETAL PAIN)., Disp: 30 tablet, Rfl: 11    methocarbamoL (ROBAXIN) 500 MG Tab, Take 1 tablet (500 mg total) by mouth 3 (three) times daily as needed., Disp: 30 tablet, Rfl: 0    predniSONE (DELTASONE) 5 MG tablet, TAKE 1 TABLET ONE TIME DAILY, Disp: 90 tablet, Rfl: 3    semaglutide, weight loss, 0.25 mg/0.5 mL PnIj, Inject 0.25 mg into the skin once a week., Disp: 2 mL, Rfl: 0

## 2025-08-01 ENCOUNTER — PATIENT MESSAGE (OUTPATIENT)
Dept: CARDIOLOGY | Facility: HOSPITAL | Age: 75
End: 2025-08-01
Payer: MEDICARE

## 2025-08-18 ENCOUNTER — PATIENT MESSAGE (OUTPATIENT)
Dept: CARDIOLOGY | Facility: CLINIC | Age: 75
End: 2025-08-18
Payer: MEDICARE

## 2025-08-29 DIAGNOSIS — M15.4 EROSIVE OSTEOARTHRITIS OF BOTH HANDS: ICD-10-CM

## 2025-09-02 RX ORDER — HYDROXYCHLOROQUINE SULFATE 200 MG/1
200 TABLET, FILM COATED ORAL 2 TIMES DAILY
Qty: 180 TABLET | Refills: 3 | Status: SHIPPED | OUTPATIENT
Start: 2025-09-02

## (undated) DEVICE — SUT MONOCRYL 3-0 UNDYED RB1

## (undated) DEVICE — DRAPE INCISE IOBAN 2 23X17IN

## (undated) DEVICE — DRAPE STERI INSTRUMENT 1018

## (undated) DEVICE — PORT AIRSEAL 12/120MM LPI

## (undated) DEVICE — CLIPPER BLADE MOD 4406 (CAREF)

## (undated) DEVICE — SUT MCRYL PLUS 4-0 PS2 27IN

## (undated) DEVICE — SOL WATER STRL IRR 1000ML

## (undated) DEVICE — SUT ABS CLIP LAPRA-TY CTD

## (undated) DEVICE — BAG TISS RETRV MONARCH 10MM

## (undated) DEVICE — GOWN NONREINF SET-IN SLV 2XL

## (undated) DEVICE — TOWEL OR DISP STRL BLUE 4/PK

## (undated) DEVICE — CLIP LIGACLIP XTRA TITANIUM

## (undated) DEVICE — SUT PDS II O CTX MONO 60

## (undated) DEVICE — DRAPE ABDOMINAL TIBURON 14X11

## (undated) DEVICE — COVER LIGHT HANDLE

## (undated) DEVICE — SEE MEDLINE ITEM 156902

## (undated) DEVICE — PAD PINK TRENDELENBURG POS XL

## (undated) DEVICE — NDL INSUF ULTRA VERESS 120MM

## (undated) DEVICE — SEAL UNIVERSAL 5MM-8MM XI

## (undated) DEVICE — SOL CLEARIFY VISUALIZATION LAP

## (undated) DEVICE — SUT CTD VICRYL UND BR CP-1

## (undated) DEVICE — CONTAINER SPECIMEN STRL 4OZ

## (undated) DEVICE — SYR 50ML CATH TIP

## (undated) DEVICE — SCISSOR 5MMX35CM DIRECT DRIVE

## (undated) DEVICE — LUBRICANT SURGILUBE 2 OZ

## (undated) DEVICE — SYR 30CC LUER LOCK

## (undated) DEVICE — DRAPE SCOPE PILLOW WARMER

## (undated) DEVICE — TRAY MINOR GEN SURG OMC

## (undated) DEVICE — TRAY CATH FOL SIL URIMTR 16FR

## (undated) DEVICE — SET TRI-LUMEN FILTERED TUBE

## (undated) DEVICE — Device

## (undated) DEVICE — ELECTRODE REM PLYHSV RETURN 9

## (undated) DEVICE — DRAPE COLUMN DAVINCI XI

## (undated) DEVICE — APPLICATOR CHLORAPREP ORN 26ML

## (undated) DEVICE — TIP YANKAUERS BULB NO VENT

## (undated) DEVICE — GOWN SURGICAL X-LARGE

## (undated) DEVICE — KIT VUETIP TROCAR SWAB

## (undated) DEVICE — TROCAR ENDOPATH XCEL 5MM 7.5CM

## (undated) DEVICE — SUT MONOCRYL 3-0 RB1

## (undated) DEVICE — DRAPE ARM DAVINCI XI

## (undated) DEVICE — BLADE SURG #15 CARBON STEEL

## (undated) DEVICE — NDL 22GA X1 1/2 REG BEVEL

## (undated) DEVICE — LOOP VESSEL BLUE MAXI

## (undated) DEVICE — SUT SILK 0 STRANDS 30IN BLK

## (undated) DEVICE — SOL ELECTROLUBE ANTI-STIC

## (undated) DEVICE — DRAPE LAP T SHT W/ INSTR PAD

## (undated) DEVICE — SOL NS 1000CC

## (undated) DEVICE — IRRIGATOR ENDOSCOPY DISP.

## (undated) DEVICE — CLIP HEMO-LOK MLX LARGE LF

## (undated) DEVICE — COVER TIP CURVED SCISSORS XI

## (undated) DEVICE — SUT 2/0 36IN COATED VICRYL

## (undated) DEVICE — SUT PDS II 0 CT-1 VIL MONO

## (undated) DEVICE — ADHESIVE DERMABOND ADVANCED